# Patient Record
Sex: MALE | Race: WHITE | Employment: FULL TIME | URBAN - METROPOLITAN AREA
[De-identification: names, ages, dates, MRNs, and addresses within clinical notes are randomized per-mention and may not be internally consistent; named-entity substitution may affect disease eponyms.]

---

## 2017-03-16 ENCOUNTER — HOSPITAL ENCOUNTER (OUTPATIENT)
Dept: PREADMISSION TESTING | Age: 57
Discharge: HOME | End: 2017-03-16
Payer: COMMERCIAL

## 2017-03-16 LAB
ALBUMIN SERPL BCP-MCNC: 4.2 G/DL (ref 3.4–5)
ALBUMIN/GLOB SERPL: 1.2 {RATIO} (ref 0.8–1.7)
ALP SERPL-CCNC: 59 U/L (ref 45–117)
ALT SERPL-CCNC: 34 U/L (ref 16–61)
ANION GAP BLD CALC-SCNC: 7 MMOL/L (ref 3–18)
APPEARANCE UR: CLEAR
APTT PPP: 31.5 SEC (ref 23–36.4)
AST SERPL W P-5'-P-CCNC: 25 U/L (ref 15–37)
BACTERIA SPEC CULT: NORMAL
BASOPHILS # BLD AUTO: 0 K/UL (ref 0–0.06)
BASOPHILS # BLD: 1 % (ref 0–2)
BILIRUB SERPL-MCNC: 0.5 MG/DL (ref 0.2–1)
BILIRUB UR QL: NEGATIVE
BUN SERPL-MCNC: 22 MG/DL (ref 7–18)
BUN/CREAT SERPL: 22 (ref 12–20)
CALCIUM SERPL-MCNC: 9.3 MG/DL (ref 8.5–10.1)
CHLORIDE SERPL-SCNC: 103 MMOL/L (ref 100–108)
CO2 SERPL-SCNC: 30 MMOL/L (ref 21–32)
COLOR UR: YELLOW
CREAT SERPL-MCNC: 1 MG/DL (ref 0.6–1.3)
DIFFERENTIAL METHOD BLD: ABNORMAL
EOSINOPHIL # BLD: 0.2 K/UL (ref 0–0.4)
EOSINOPHIL NFR BLD: 4 % (ref 0–5)
ERYTHROCYTE [DISTWIDTH] IN BLOOD BY AUTOMATED COUNT: 13.5 % (ref 11.6–14.5)
ERYTHROCYTE [SEDIMENTATION RATE] IN BLOOD: 11 MM/HR (ref 0–20)
EST. AVERAGE GLUCOSE BLD GHB EST-MCNC: 120 MG/DL
GLOBULIN SER CALC-MCNC: 3.6 G/DL (ref 2–4)
GLUCOSE SERPL-MCNC: 92 MG/DL (ref 74–99)
GLUCOSE UR STRIP.AUTO-MCNC: NEGATIVE MG/DL
HBA1C MFR BLD: 5.8 % (ref 4.5–5.6)
HCT VFR BLD AUTO: 45.3 % (ref 36–48)
HGB BLD-MCNC: 15.4 G/DL (ref 13–16)
HGB UR QL STRIP: NEGATIVE
INR PPP: 1 (ref 0.8–1.2)
KETONES UR QL STRIP.AUTO: NEGATIVE MG/DL
LEUKOCYTE ESTERASE UR QL STRIP.AUTO: NEGATIVE
LYMPHOCYTES # BLD AUTO: 53 % (ref 21–52)
LYMPHOCYTES # BLD: 3.5 K/UL (ref 0.9–3.6)
MCH RBC QN AUTO: 29.5 PG (ref 24–34)
MCHC RBC AUTO-ENTMCNC: 34 G/DL (ref 31–37)
MCV RBC AUTO: 86.8 FL (ref 74–97)
MONOCYTES # BLD: 0.7 K/UL (ref 0.05–1.2)
MONOCYTES NFR BLD AUTO: 11 % (ref 3–10)
NEUTS SEG # BLD: 2 K/UL (ref 1.8–8)
NEUTS SEG NFR BLD AUTO: 31 % (ref 40–73)
NITRITE UR QL STRIP.AUTO: NEGATIVE
PH UR STRIP: 6.5 [PH] (ref 5–8)
PLATELET # BLD AUTO: 192 K/UL (ref 135–420)
PMV BLD AUTO: 9.7 FL (ref 9.2–11.8)
POTASSIUM SERPL-SCNC: 4.7 MMOL/L (ref 3.5–5.5)
PROT SERPL-MCNC: 7.8 G/DL (ref 6.4–8.2)
PROT UR STRIP-MCNC: NEGATIVE MG/DL
PROTHROMBIN TIME: 13.2 SEC (ref 11.5–15.2)
RBC # BLD AUTO: 5.22 M/UL (ref 4.7–5.5)
SERVICE CMNT-IMP: NORMAL
SODIUM SERPL-SCNC: 140 MMOL/L (ref 136–145)
SP GR UR REFRACTOMETRY: 1.01 (ref 1–1.03)
UROBILINOGEN UR QL STRIP.AUTO: 0.2 EU/DL (ref 0.2–1)
WBC # BLD AUTO: 6.5 K/UL (ref 4.6–13.2)

## 2017-03-16 PROCEDURE — 80053 COMPREHEN METABOLIC PANEL: CPT | Performed by: ORTHOPAEDIC SURGERY

## 2017-03-16 PROCEDURE — 83036 HEMOGLOBIN GLYCOSYLATED A1C: CPT | Performed by: ORTHOPAEDIC SURGERY

## 2017-03-16 PROCEDURE — 93005 ELECTROCARDIOGRAM TRACING: CPT

## 2017-03-16 PROCEDURE — 85730 THROMBOPLASTIN TIME PARTIAL: CPT | Performed by: ORTHOPAEDIC SURGERY

## 2017-03-16 PROCEDURE — 36415 COLL VENOUS BLD VENIPUNCTURE: CPT | Performed by: ORTHOPAEDIC SURGERY

## 2017-03-16 PROCEDURE — 85652 RBC SED RATE AUTOMATED: CPT | Performed by: ORTHOPAEDIC SURGERY

## 2017-03-16 PROCEDURE — 81003 URINALYSIS AUTO W/O SCOPE: CPT | Performed by: ORTHOPAEDIC SURGERY

## 2017-03-16 PROCEDURE — 85610 PROTHROMBIN TIME: CPT | Performed by: ORTHOPAEDIC SURGERY

## 2017-03-16 PROCEDURE — 87641 MR-STAPH DNA AMP PROBE: CPT | Performed by: ORTHOPAEDIC SURGERY

## 2017-03-16 PROCEDURE — 85025 COMPLETE CBC W/AUTO DIFF WBC: CPT | Performed by: ORTHOPAEDIC SURGERY

## 2017-03-19 LAB
ATRIAL RATE: 51 BPM
CALCULATED P AXIS, ECG09: 29 DEGREES
CALCULATED R AXIS, ECG10: 12 DEGREES
CALCULATED T AXIS, ECG11: 39 DEGREES
DIAGNOSIS, 93000: NORMAL
P-R INTERVAL, ECG05: 158 MS
Q-T INTERVAL, ECG07: 436 MS
QRS DURATION, ECG06: 96 MS
QTC CALCULATION (BEZET), ECG08: 401 MS
VENTRICULAR RATE, ECG03: 51 BPM

## 2017-04-03 ENCOUNTER — ANESTHESIA EVENT (OUTPATIENT)
Dept: SURGERY | Age: 57
DRG: 468 | End: 2017-04-03
Payer: COMMERCIAL

## 2017-04-03 PROBLEM — Z96.659 PAIN DUE TO KNEE JOINT PROSTHESIS (HCC): Chronic | Status: ACTIVE | Noted: 2017-04-03

## 2017-04-03 PROBLEM — T84.84XA PAIN DUE TO KNEE JOINT PROSTHESIS (HCC): Chronic | Status: ACTIVE | Noted: 2017-04-03

## 2017-04-03 NOTE — H&P
[address] [facility] [address] Medicine  History and Physical Exam    Patient: Helga Muniz MRN: [id number]  SSN: [id number]    YOB: 1960  Age: 49 y.o. Sex: male      Subjective:      Chief Complaint: Right knee pain    History of Present Illness:  Patient complains of right knee stiffness, pain and difficulty ambulating, which has progressed over the past several months. he is s/p prior medial unicondylar arthroplasty of the right knee on 12/15/14. He continues to have stiffness and pain that has persisted and progressed despite conservative treatments and therapies. The patient has been previously treated with oral analgesics, injections, physical therapy. The patient has at this time opted for surgical intervention. Past Medical History:   Diagnosis Date    Arthritis     mild- osteo    Asthma     exercise induced    Chronic pain     knees    Liver disease 1998    Hepatitis C-clear now    Osteoarthritis of right knee 12/14/2014    Pain due to knee joint prosthesis ([address] 4/3/2017    Unspecified sleep apnea     has CPAP machine but hasn't used in 5 years     Past Surgical History:   Procedure Laterality Date    HX APPENDECTOMY  1992    HX GI  4/2012    blocked small intestine-resolved on its own    HX HEENT      T & A    HX KNEE ARTHROSCOPY Left     HX KNEE REPLACEMENT Right 02/2014   [facility]  6-    right knee scope x2    HX ROTATOR CUFF REPAIR Right      Social History     Occupational History    Not on file. Social History Main Topics    Smoking status: Never Smoker    Smokeless tobacco: Never Used    Alcohol use 1.0 oz/week     2 Cans of beer per week    Drug use: No    Sexual activity: Yes     Prior to Admission medications    Medication Sig Start Date End Date Taking? Authorizing Provider   naproxen sodium (ALEVE) 220 mg cap Take 1 Tab by mouth as needed.  Indications: Pain    Historical Provider       Allergies: No Known Allergies     Review of Systems:  Pertinent items are noted in the History of Present Illness. Objective:       Physical Exam:  HEENT: Normocephalic, atraumatic  Lungs:  Clear to auscultation  Heart:   Regular rate and rhythm  Abdomen: Soft  Extremities:  Pain and limited range of motion of the right knee. Generalized tenderness. Healed surgical scar. Antalgic gait noted. Healed surgical scar. Assessment:      Pain in the knee following right medial unicondylar arthroplasty    Plan:       Proceed with scheduled REVISION ARTHROPLASTY WITH POLYETHYLENE EXCHANGE AND EXCISION OF SCAR TISSUE. The patient would benefit from the use of Vancomycin for antibiotic prophylaxis due to increased risk of infection. The various methods of treatment have been discussed with the patient and family. After consideration of risks, benefits, and other options for treatment, the patient has consented to surgical interventions. Questions were answered and preoperative teaching was done by Dr Betty Mendes.      Signed By: NICHOL Prajapati     April 3, 2017

## 2017-04-04 ENCOUNTER — HOSPITAL ENCOUNTER (INPATIENT)
Age: 57
LOS: 1 days | Discharge: HOME HEALTH | DRG: 468 | End: 2017-04-05
Attending: ORTHOPAEDIC SURGERY | Admitting: ORTHOPAEDIC SURGERY
Payer: COMMERCIAL

## 2017-04-04 ENCOUNTER — ANESTHESIA (OUTPATIENT)
Dept: SURGERY | Age: 57
DRG: 468 | End: 2017-04-04
Payer: COMMERCIAL

## 2017-04-04 ENCOUNTER — APPOINTMENT (OUTPATIENT)
Dept: GENERAL RADIOLOGY | Age: 57
DRG: 468 | End: 2017-04-04
Attending: PHYSICIAN ASSISTANT
Payer: COMMERCIAL

## 2017-04-04 PROBLEM — T84.84XA PAIN DUE TO UNICOMPARTMENTAL ARTHROPLASTY OF KNEE (HCC): Status: ACTIVE | Noted: 2017-04-04

## 2017-04-04 PROBLEM — Z96.659 PAIN DUE TO UNICOMPARTMENTAL ARTHROPLASTY OF KNEE (HCC): Status: ACTIVE | Noted: 2017-04-04

## 2017-04-04 LAB
ABO + RH BLD: NORMAL
BLOOD GROUP ANTIBODIES SERPL: NORMAL
GLUCOSE BLD STRIP.AUTO-MCNC: 100 MG/DL (ref 70–110)
SPECIMEN EXP DATE BLD: NORMAL

## 2017-04-04 PROCEDURE — 74011000258 HC RX REV CODE- 258: Performed by: ORTHOPAEDIC SURGERY

## 2017-04-04 PROCEDURE — 77030018836 HC SOL IRR NACL ICUM -A: Performed by: ORTHOPAEDIC SURGERY

## 2017-04-04 PROCEDURE — 77030020259 HC SOL INJ SOD CL 0.9% 100ML BG: Performed by: ORTHOPAEDIC SURGERY

## 2017-04-04 PROCEDURE — 74011250637 HC RX REV CODE- 250/637: Performed by: PHYSICIAN ASSISTANT

## 2017-04-04 PROCEDURE — 77030018835 HC SOL IRR LR ICUM -A: Performed by: ORTHOPAEDIC SURGERY

## 2017-04-04 PROCEDURE — 77030033067 HC SUT PDO STRATFX SPIR J&J -B: Performed by: ORTHOPAEDIC SURGERY

## 2017-04-04 PROCEDURE — 77010033678 HC OXYGEN DAILY

## 2017-04-04 PROCEDURE — 77030003666 HC NDL SPINAL BD -A: Performed by: ORTHOPAEDIC SURGERY

## 2017-04-04 PROCEDURE — 74011250636 HC RX REV CODE- 250/636

## 2017-04-04 PROCEDURE — 86900 BLOOD TYPING SEROLOGIC ABO: CPT | Performed by: ORTHOPAEDIC SURGERY

## 2017-04-04 PROCEDURE — 77030027138 HC INCENT SPIROMETER -A: Performed by: ORTHOPAEDIC SURGERY

## 2017-04-04 PROCEDURE — 0SPT0JZ REMOVAL OF SYNTHETIC SUBSTITUTE FROM RIGHT KNEE JOINT, FEMORAL SURFACE, OPEN APPROACH: ICD-10-PCS | Performed by: ORTHOPAEDIC SURGERY

## 2017-04-04 PROCEDURE — 74011250636 HC RX REV CODE- 250/636: Performed by: ORTHOPAEDIC SURGERY

## 2017-04-04 PROCEDURE — 77030016060 HC NDL NRV BLK TELE -A: Performed by: SPECIALIST

## 2017-04-04 PROCEDURE — 88305 TISSUE EXAM BY PATHOLOGIST: CPT | Performed by: ORTHOPAEDIC SURGERY

## 2017-04-04 PROCEDURE — 0SRT0J9 REPLACEMENT OF RIGHT KNEE JOINT, FEMORAL SURFACE WITH SYNTHETIC SUBSTITUTE, CEMENTED, OPEN APPROACH: ICD-10-PCS | Performed by: ORTHOPAEDIC SURGERY

## 2017-04-04 PROCEDURE — 74011000258 HC RX REV CODE- 258: Performed by: PHYSICIAN ASSISTANT

## 2017-04-04 PROCEDURE — 97116 GAIT TRAINING THERAPY: CPT

## 2017-04-04 PROCEDURE — C9290 INJ, BUPIVACAINE LIPOSOME: HCPCS | Performed by: ORTHOPAEDIC SURGERY

## 2017-04-04 PROCEDURE — 74011000258 HC RX REV CODE- 258

## 2017-04-04 PROCEDURE — 64450 NJX AA&/STRD OTHER PN/BRANCH: CPT | Performed by: SPECIALIST

## 2017-04-04 PROCEDURE — 74011000250 HC RX REV CODE- 250: Performed by: ORTHOPAEDIC SURGERY

## 2017-04-04 PROCEDURE — 77030034694 HC SCPL CANADY PLSM DISP USMD -E: Performed by: ORTHOPAEDIC SURGERY

## 2017-04-04 PROCEDURE — 36415 COLL VENOUS BLD VENIPUNCTURE: CPT | Performed by: ORTHOPAEDIC SURGERY

## 2017-04-04 PROCEDURE — 77030011640 HC PAD GRND REM COVD -A: Performed by: ORTHOPAEDIC SURGERY

## 2017-04-04 PROCEDURE — C1713 ANCHOR/SCREW BN/BN,TIS/BN: HCPCS | Performed by: ORTHOPAEDIC SURGERY

## 2017-04-04 PROCEDURE — 74011250637 HC RX REV CODE- 250/637: Performed by: SPECIALIST

## 2017-04-04 PROCEDURE — C1776 JOINT DEVICE (IMPLANTABLE): HCPCS | Performed by: ORTHOPAEDIC SURGERY

## 2017-04-04 PROCEDURE — 74011250636 HC RX REV CODE- 250/636: Performed by: PHYSICIAN ASSISTANT

## 2017-04-04 PROCEDURE — 65270000029 HC RM PRIVATE

## 2017-04-04 PROCEDURE — 76060000033 HC ANESTHESIA 1 TO 1.5 HR: Performed by: ORTHOPAEDIC SURGERY

## 2017-04-04 PROCEDURE — 77030031139 HC SUT VCRL2 J&J -A: Performed by: ORTHOPAEDIC SURGERY

## 2017-04-04 PROCEDURE — 77030034479 HC ADH SKN CLSR PRINEO J&J -B: Performed by: ORTHOPAEDIC SURGERY

## 2017-04-04 PROCEDURE — 77030011256 HC DRSG MEPILEX <16IN NO BORD MOLN -A: Performed by: ORTHOPAEDIC SURGERY

## 2017-04-04 PROCEDURE — 76942 ECHO GUIDE FOR BIOPSY: CPT | Performed by: ORTHOPAEDIC SURGERY

## 2017-04-04 PROCEDURE — 74011000250 HC RX REV CODE- 250: Performed by: PHYSICIAN ASSISTANT

## 2017-04-04 PROCEDURE — 76210000063 HC OR PH I REC FIRST 0.5 HR: Performed by: ORTHOPAEDIC SURGERY

## 2017-04-04 PROCEDURE — 74011250636 HC RX REV CODE- 250/636: Performed by: SPECIALIST

## 2017-04-04 PROCEDURE — 0SUT09Z SUPPLEMENT RIGHT KNEE JOINT, FEMORAL SURFACE WITH LINER, OPEN APPROACH: ICD-10-PCS | Performed by: ORTHOPAEDIC SURGERY

## 2017-04-04 PROCEDURE — 76010000149 HC OR TIME 1 TO 1.5 HR: Performed by: ORTHOPAEDIC SURGERY

## 2017-04-04 PROCEDURE — 77030032489 HC SLV COMPR SCD FT CUF COVD -B: Performed by: ORTHOPAEDIC SURGERY

## 2017-04-04 PROCEDURE — 73560 X-RAY EXAM OF KNEE 1 OR 2: CPT

## 2017-04-04 PROCEDURE — 0SPC09Z REMOVAL OF LINER FROM RIGHT KNEE JOINT, OPEN APPROACH: ICD-10-PCS | Performed by: ORTHOPAEDIC SURGERY

## 2017-04-04 PROCEDURE — 77030011628: Performed by: ORTHOPAEDIC SURGERY

## 2017-04-04 PROCEDURE — 74011250637 HC RX REV CODE- 250/637: Performed by: ORTHOPAEDIC SURGERY

## 2017-04-04 PROCEDURE — 77030036563 HC WRP CLD THER KNE S2SG -B: Performed by: ORTHOPAEDIC SURGERY

## 2017-04-04 PROCEDURE — 77030020813 HC INST SCULP CEM KT DISP S&N -B: Performed by: ORTHOPAEDIC SURGERY

## 2017-04-04 PROCEDURE — 77030027355 HC HNDPC IRR SURGLAV STRY -B: Performed by: ORTHOPAEDIC SURGERY

## 2017-04-04 PROCEDURE — 74011000250 HC RX REV CODE- 250

## 2017-04-04 PROCEDURE — 97161 PT EVAL LOW COMPLEX 20 MIN: CPT

## 2017-04-04 PROCEDURE — 82962 GLUCOSE BLOOD TEST: CPT

## 2017-04-04 PROCEDURE — 77030012508 HC MSK AIRWY LMA AMBU -A: Performed by: SPECIALIST

## 2017-04-04 PROCEDURE — 77030020782 HC GWN BAIR PAWS FLX 3M -B: Performed by: ORTHOPAEDIC SURGERY

## 2017-04-04 DEVICE — CEMENT BNE 40GM FULL DOSE PMMA W/O GENT M VISC N RADPQ LNG: Type: IMPLANTABLE DEVICE | Site: KNEE | Status: FUNCTIONAL

## 2017-04-04 DEVICE — IMPLANTABLE DEVICE: Type: IMPLANTABLE DEVICE | Site: KNEE | Status: FUNCTIONAL

## 2017-04-04 RX ORDER — ACETAMINOPHEN 325 MG/1
650 TABLET ORAL EVERY 6 HOURS
Status: DISCONTINUED | OUTPATIENT
Start: 2017-04-05 | End: 2017-04-05 | Stop reason: HOSPADM

## 2017-04-04 RX ORDER — ZOLPIDEM TARTRATE 5 MG/1
5-10 TABLET ORAL
Status: DISCONTINUED | OUTPATIENT
Start: 2017-04-04 | End: 2017-04-05 | Stop reason: HOSPADM

## 2017-04-04 RX ORDER — ONDANSETRON 2 MG/ML
4 INJECTION INTRAMUSCULAR; INTRAVENOUS ONCE
Status: DISCONTINUED | OUTPATIENT
Start: 2017-04-04 | End: 2017-04-04 | Stop reason: HOSPADM

## 2017-04-04 RX ORDER — ACETAMINOPHEN 10 MG/ML
1000 INJECTION, SOLUTION INTRAVENOUS ONCE
Status: COMPLETED | OUTPATIENT
Start: 2017-04-04 | End: 2017-04-04

## 2017-04-04 RX ORDER — OXYCODONE AND ACETAMINOPHEN 5; 325 MG/1; MG/1
1 TABLET ORAL AS NEEDED
Status: DISCONTINUED | OUTPATIENT
Start: 2017-04-04 | End: 2017-04-04 | Stop reason: HOSPADM

## 2017-04-04 RX ORDER — MELOXICAM 7.5 MG/1
7.5 TABLET ORAL 2 TIMES DAILY
Status: DISCONTINUED | OUTPATIENT
Start: 2017-04-04 | End: 2017-04-05 | Stop reason: HOSPADM

## 2017-04-04 RX ORDER — SODIUM CHLORIDE, SODIUM LACTATE, POTASSIUM CHLORIDE, CALCIUM CHLORIDE 600; 310; 30; 20 MG/100ML; MG/100ML; MG/100ML; MG/100ML
125 INJECTION, SOLUTION INTRAVENOUS CONTINUOUS
Status: DISCONTINUED | OUTPATIENT
Start: 2017-04-04 | End: 2017-04-05 | Stop reason: HOSPADM

## 2017-04-04 RX ORDER — ONDANSETRON 2 MG/ML
4 INJECTION INTRAMUSCULAR; INTRAVENOUS
Status: DISCONTINUED | OUTPATIENT
Start: 2017-04-04 | End: 2017-04-05 | Stop reason: HOSPADM

## 2017-04-04 RX ORDER — OXYCODONE HYDROCHLORIDE 5 MG/1
10 TABLET ORAL ONCE
Status: COMPLETED | OUTPATIENT
Start: 2017-04-04 | End: 2017-04-04

## 2017-04-04 RX ORDER — NALOXONE HYDROCHLORIDE 0.4 MG/ML
0.4 INJECTION, SOLUTION INTRAMUSCULAR; INTRAVENOUS; SUBCUTANEOUS AS NEEDED
Status: DISCONTINUED | OUTPATIENT
Start: 2017-04-04 | End: 2017-04-05 | Stop reason: HOSPADM

## 2017-04-04 RX ORDER — SODIUM CHLORIDE 0.9 % (FLUSH) 0.9 %
5-10 SYRINGE (ML) INJECTION EVERY 8 HOURS
Status: DISCONTINUED | OUTPATIENT
Start: 2017-04-04 | End: 2017-04-05 | Stop reason: HOSPADM

## 2017-04-04 RX ORDER — METOCLOPRAMIDE HYDROCHLORIDE 5 MG/ML
10 INJECTION INTRAMUSCULAR; INTRAVENOUS
Status: DISCONTINUED | OUTPATIENT
Start: 2017-04-04 | End: 2017-04-05 | Stop reason: HOSPADM

## 2017-04-04 RX ORDER — ACETAMINOPHEN 10 MG/ML
1000 INJECTION, SOLUTION INTRAVENOUS EVERY 6 HOURS
Status: COMPLETED | OUTPATIENT
Start: 2017-04-04 | End: 2017-04-04

## 2017-04-04 RX ORDER — SODIUM CHLORIDE 0.9 % (FLUSH) 0.9 %
5-10 SYRINGE (ML) INJECTION AS NEEDED
Status: DISCONTINUED | OUTPATIENT
Start: 2017-04-04 | End: 2017-04-05 | Stop reason: HOSPADM

## 2017-04-04 RX ORDER — SODIUM CHLORIDE, SODIUM LACTATE, POTASSIUM CHLORIDE, CALCIUM CHLORIDE 600; 310; 30; 20 MG/100ML; MG/100ML; MG/100ML; MG/100ML
50 INJECTION, SOLUTION INTRAVENOUS CONTINUOUS
Status: DISCONTINUED | OUTPATIENT
Start: 2017-04-04 | End: 2017-04-04 | Stop reason: HOSPADM

## 2017-04-04 RX ORDER — MIDAZOLAM HYDROCHLORIDE 1 MG/ML
INJECTION, SOLUTION INTRAMUSCULAR; INTRAVENOUS AS NEEDED
Status: DISCONTINUED | OUTPATIENT
Start: 2017-04-04 | End: 2017-04-04 | Stop reason: HOSPADM

## 2017-04-04 RX ORDER — OXYCODONE HYDROCHLORIDE 5 MG/1
5-10 TABLET ORAL
Status: DISCONTINUED | OUTPATIENT
Start: 2017-04-04 | End: 2017-04-05 | Stop reason: HOSPADM

## 2017-04-04 RX ORDER — ONDANSETRON 2 MG/ML
INJECTION INTRAMUSCULAR; INTRAVENOUS AS NEEDED
Status: DISCONTINUED | OUTPATIENT
Start: 2017-04-04 | End: 2017-04-04 | Stop reason: HOSPADM

## 2017-04-04 RX ORDER — GLYCOPYRROLATE 0.2 MG/ML
INJECTION INTRAMUSCULAR; INTRAVENOUS AS NEEDED
Status: DISCONTINUED | OUTPATIENT
Start: 2017-04-04 | End: 2017-04-04 | Stop reason: HOSPADM

## 2017-04-04 RX ORDER — DEXAMETHASONE SODIUM PHOSPHATE 4 MG/ML
INJECTION, SOLUTION INTRA-ARTICULAR; INTRALESIONAL; INTRAMUSCULAR; INTRAVENOUS; SOFT TISSUE AS NEEDED
Status: DISCONTINUED | OUTPATIENT
Start: 2017-04-04 | End: 2017-04-04 | Stop reason: HOSPADM

## 2017-04-04 RX ORDER — SODIUM CHLORIDE 9 MG/ML
300 INJECTION, SOLUTION INTRAVENOUS CONTINUOUS
Status: DISPENSED | OUTPATIENT
Start: 2017-04-04 | End: 2017-04-04

## 2017-04-04 RX ORDER — PANTOPRAZOLE SODIUM 40 MG/1
40 TABLET, DELAYED RELEASE ORAL DAILY
Status: DISCONTINUED | OUTPATIENT
Start: 2017-04-04 | End: 2017-04-04

## 2017-04-04 RX ORDER — ASPIRIN 325 MG
325 TABLET ORAL 2 TIMES DAILY
Qty: 42 TAB | Refills: 0 | Status: SHIPPED | OUTPATIENT
Start: 2017-04-04 | End: 2017-04-25

## 2017-04-04 RX ORDER — SODIUM CHLORIDE 9 MG/ML
125 INJECTION, SOLUTION INTRAVENOUS CONTINUOUS
Status: DISPENSED | OUTPATIENT
Start: 2017-04-04 | End: 2017-04-05

## 2017-04-04 RX ORDER — DOCUSATE SODIUM 100 MG/1
100 CAPSULE, LIQUID FILLED ORAL 2 TIMES DAILY
Status: DISCONTINUED | OUTPATIENT
Start: 2017-04-04 | End: 2017-04-05 | Stop reason: HOSPADM

## 2017-04-04 RX ORDER — MELOXICAM 7.5 MG/1
7.5 TABLET ORAL 2 TIMES DAILY
Qty: 28 TAB | Refills: 0 | Status: SHIPPED | OUTPATIENT
Start: 2017-04-04 | End: 2017-04-18

## 2017-04-04 RX ORDER — SODIUM CHLORIDE 0.9 % (FLUSH) 0.9 %
5-10 SYRINGE (ML) INJECTION AS NEEDED
Status: DISCONTINUED | OUTPATIENT
Start: 2017-04-04 | End: 2017-04-04 | Stop reason: HOSPADM

## 2017-04-04 RX ORDER — DIPHENHYDRAMINE HCL 25 MG
25 CAPSULE ORAL
Status: DISCONTINUED | OUTPATIENT
Start: 2017-04-04 | End: 2017-04-05 | Stop reason: HOSPADM

## 2017-04-04 RX ORDER — LIDOCAINE HYDROCHLORIDE 20 MG/ML
INJECTION, SOLUTION EPIDURAL; INFILTRATION; INTRACAUDAL; PERINEURAL AS NEEDED
Status: DISCONTINUED | OUTPATIENT
Start: 2017-04-04 | End: 2017-04-04 | Stop reason: HOSPADM

## 2017-04-04 RX ORDER — KETOROLAC TROMETHAMINE 30 MG/ML
15 INJECTION, SOLUTION INTRAMUSCULAR; INTRAVENOUS
Status: ACTIVE | OUTPATIENT
Start: 2017-04-04 | End: 2017-04-05

## 2017-04-04 RX ORDER — FENTANYL CITRATE 50 UG/ML
25 INJECTION, SOLUTION INTRAMUSCULAR; INTRAVENOUS
Status: ACTIVE | OUTPATIENT
Start: 2017-04-04 | End: 2017-04-04

## 2017-04-04 RX ORDER — ASPIRIN 325 MG/1
325 TABLET, FILM COATED ORAL
Status: DISCONTINUED | OUTPATIENT
Start: 2017-04-04 | End: 2017-04-05 | Stop reason: HOSPADM

## 2017-04-04 RX ORDER — OXYCODONE AND ACETAMINOPHEN 5; 325 MG/1; MG/1
TABLET ORAL
Qty: 60 TAB | Refills: 0 | Status: SHIPPED | OUTPATIENT
Start: 2017-04-04 | End: 2020-01-28

## 2017-04-04 RX ORDER — LANOLIN ALCOHOL/MO/W.PET/CERES
1 CREAM (GRAM) TOPICAL 3 TIMES DAILY
Status: DISCONTINUED | OUTPATIENT
Start: 2017-04-04 | End: 2017-04-05 | Stop reason: HOSPADM

## 2017-04-04 RX ORDER — FENTANYL CITRATE 50 UG/ML
INJECTION, SOLUTION INTRAMUSCULAR; INTRAVENOUS AS NEEDED
Status: DISCONTINUED | OUTPATIENT
Start: 2017-04-04 | End: 2017-04-04 | Stop reason: HOSPADM

## 2017-04-04 RX ORDER — PANTOPRAZOLE SODIUM 40 MG/1
40 TABLET, DELAYED RELEASE ORAL DAILY
Status: DISCONTINUED | OUTPATIENT
Start: 2017-04-04 | End: 2017-04-05 | Stop reason: HOSPADM

## 2017-04-04 RX ORDER — HYDROMORPHONE HYDROCHLORIDE 1 MG/ML
0.5 INJECTION, SOLUTION INTRAMUSCULAR; INTRAVENOUS; SUBCUTANEOUS
Status: DISCONTINUED | OUTPATIENT
Start: 2017-04-04 | End: 2017-04-04 | Stop reason: HOSPADM

## 2017-04-04 RX ORDER — PREGABALIN 50 MG/1
50 CAPSULE ORAL
Status: COMPLETED | OUTPATIENT
Start: 2017-04-04 | End: 2017-04-04

## 2017-04-04 RX ORDER — CELECOXIB 100 MG/1
200 CAPSULE ORAL
Status: COMPLETED | OUTPATIENT
Start: 2017-04-04 | End: 2017-04-04

## 2017-04-04 RX ORDER — PROPOFOL 10 MG/ML
INJECTION, EMULSION INTRAVENOUS AS NEEDED
Status: DISCONTINUED | OUTPATIENT
Start: 2017-04-04 | End: 2017-04-04 | Stop reason: HOSPADM

## 2017-04-04 RX ORDER — NALOXONE HYDROCHLORIDE 0.4 MG/ML
0.1 INJECTION, SOLUTION INTRAMUSCULAR; INTRAVENOUS; SUBCUTANEOUS
Status: DISCONTINUED | OUTPATIENT
Start: 2017-04-04 | End: 2017-04-04 | Stop reason: HOSPADM

## 2017-04-04 RX ORDER — DIPHENHYDRAMINE HYDROCHLORIDE 50 MG/ML
12.5 INJECTION, SOLUTION INTRAMUSCULAR; INTRAVENOUS
Status: DISCONTINUED | OUTPATIENT
Start: 2017-04-04 | End: 2017-04-05 | Stop reason: HOSPADM

## 2017-04-04 RX ADMIN — SODIUM CHLORIDE 300 ML/HR: 900 INJECTION, SOLUTION INTRAVENOUS at 14:08

## 2017-04-04 RX ADMIN — SODIUM CHLORIDE, SODIUM LACTATE, POTASSIUM CHLORIDE, AND CALCIUM CHLORIDE 125 ML/HR: 600; 310; 30; 20 INJECTION, SOLUTION INTRAVENOUS at 07:54

## 2017-04-04 RX ADMIN — PREGABALIN 50 MG: 50 CAPSULE ORAL at 08:25

## 2017-04-04 RX ADMIN — DOCUSATE SODIUM 100 MG: 100 CAPSULE, LIQUID FILLED ORAL at 21:14

## 2017-04-04 RX ADMIN — ONDANSETRON 4 MG: 2 INJECTION INTRAMUSCULAR; INTRAVENOUS at 10:43

## 2017-04-04 RX ADMIN — DEXAMETHASONE SODIUM PHOSPHATE 4 MG: 4 INJECTION, SOLUTION INTRA-ARTICULAR; INTRALESIONAL; INTRAMUSCULAR; INTRAVENOUS; SOFT TISSUE at 10:43

## 2017-04-04 RX ADMIN — OXYCODONE HYDROCHLORIDE 10 MG: 5 TABLET ORAL at 08:26

## 2017-04-04 RX ADMIN — PROPOFOL 200 MG: 10 INJECTION, EMULSION INTRAVENOUS at 10:29

## 2017-04-04 RX ADMIN — SODIUM CHLORIDE, SODIUM LACTATE, POTASSIUM CHLORIDE, AND CALCIUM CHLORIDE: 600; 310; 30; 20 INJECTION, SOLUTION INTRAVENOUS at 11:16

## 2017-04-04 RX ADMIN — ACETAMINOPHEN 1000 MG: 10 INJECTION, SOLUTION INTRAVENOUS at 10:27

## 2017-04-04 RX ADMIN — PANTOPRAZOLE SODIUM 40 MG: 40 TABLET, DELAYED RELEASE ORAL at 08:26

## 2017-04-04 RX ADMIN — ACETAMINOPHEN 1000 MG: 10 INJECTION, SOLUTION INTRAVENOUS at 22:28

## 2017-04-04 RX ADMIN — CELECOXIB 200 MG: 100 CAPSULE ORAL at 08:26

## 2017-04-04 RX ADMIN — SODIUM CHLORIDE, SODIUM LACTATE, POTASSIUM CHLORIDE, AND CALCIUM CHLORIDE 1000 ML: 600; 310; 30; 20 INJECTION, SOLUTION INTRAVENOUS at 07:54

## 2017-04-04 RX ADMIN — SODIUM CHLORIDE 125 ML/HR: 900 INJECTION, SOLUTION INTRAVENOUS at 17:53

## 2017-04-04 RX ADMIN — FERROUS SULFATE TAB 325 MG (65 MG ELEMENTAL FE) 325 MG: 325 (65 FE) TAB at 22:28

## 2017-04-04 RX ADMIN — SODIUM CHLORIDE 1 G: 900 INJECTION, SOLUTION INTRAVENOUS at 10:42

## 2017-04-04 RX ADMIN — VANCOMYCIN HYDROCHLORIDE 1750 G: 10 INJECTION, POWDER, LYOPHILIZED, FOR SOLUTION INTRAVENOUS at 10:30

## 2017-04-04 RX ADMIN — ASPIRIN 325 MG: 325 TABLET, FILM COATED ORAL at 17:53

## 2017-04-04 RX ADMIN — FERROUS SULFATE TAB 325 MG (65 MG ELEMENTAL FE) 325 MG: 325 (65 FE) TAB at 16:24

## 2017-04-04 RX ADMIN — VANCOMYCIN HYDROCHLORIDE 1750 MG: 10 INJECTION, POWDER, LYOPHILIZED, FOR SOLUTION INTRAVENOUS at 21:14

## 2017-04-04 RX ADMIN — MIDAZOLAM HYDROCHLORIDE 2 MG: 1 INJECTION, SOLUTION INTRAMUSCULAR; INTRAVENOUS at 09:28

## 2017-04-04 RX ADMIN — LIDOCAINE HYDROCHLORIDE 80 MG: 20 INJECTION, SOLUTION EPIDURAL; INFILTRATION; INTRACAUDAL; PERINEURAL at 10:29

## 2017-04-04 RX ADMIN — ACETAMINOPHEN 1000 MG: 10 INJECTION, SOLUTION INTRAVENOUS at 16:24

## 2017-04-04 RX ADMIN — GLYCOPYRROLATE 0.4 MG: 0.2 INJECTION INTRAMUSCULAR; INTRAVENOUS at 10:32

## 2017-04-04 RX ADMIN — SODIUM CHLORIDE 1 G: 900 INJECTION, SOLUTION INTRAVENOUS at 11:25

## 2017-04-04 RX ADMIN — FENTANYL CITRATE 50 MCG: 50 INJECTION, SOLUTION INTRAMUSCULAR; INTRAVENOUS at 10:23

## 2017-04-04 RX ADMIN — MELOXICAM 7.5 MG: 7.5 TABLET ORAL at 21:14

## 2017-04-04 RX ADMIN — LIDOCAINE HYDROCHLORIDE 20 MG: 20 INJECTION, SOLUTION EPIDURAL; INFILTRATION; INTRACAUDAL; PERINEURAL at 11:43

## 2017-04-04 NOTE — INTERVAL H&P NOTE
H&P Update:  Gregor Favre was seen and examined. History and physical has been reviewed. The patient has been examined.  There have been no significant clinical changes since the completion of the originally dated History and Physical.    Signed By: Humberto Alpers, MD     April 4, 2017 8:41 AM

## 2017-04-04 NOTE — PROGRESS NOTES
Problem: Mobility Impaired (Adult and Pediatric)  Goal: *Acute Goals and Plan of Care (Insert Text)  In 1-7 days pt will be able to perform:  ST. Bed mobility: Rolling L to R to L modified independent for positioning. 2. Supine to sit to supine S with HR for meals. 3. Sit to stand to sit S with RW in prep for ambulation. LT. Gait: Ambulate >150ft S with RW, WBAT, for home/community mobility. 2. Stair Negotiation: Ascend/descend >3 steps CGA with HR for home entry. 3. Activity tolerance: Tolerate up in chair 1-2 hours for ADLs. 4. Patient/Family Education: Patient/family to be independent with HEP for follow-up care and safe discharge. PHYSICAL THERAPY EVALUATION     Patient: Ambreen Parada (89 y.o. male)  Date: 2017  Primary Diagnosis: PAIN DUE TO INTERNAL ORTHOPEDIC PROSTHETIC DEVICES,IMPLANTS & GRAFTS  Pain due to unicompartmental arthroplasty of knee (HCC)  Procedure(s) (LRB):  REVISION RIGHT KNEE WITH OPEN POLY EXCHANGE & SCAR TISSUE EXCISION (Right) Day of Surgery   Precautions:   Fall, WBAT      ASSESSMENT :  Based on the objective data described below, the patient presents with decreased functional mobility and independence in regard to bed mobility, transfers, gt quality and tolerance, R knee AROM, R knee strength, pain, stair negotiation and safety due to recent R knee surgery. Pt denies pain at present and post evaluation. Pt able to transition supine to sit and sit to supine SBA. Pt was CGA for sit to stand w/ vc for technique and safety. Pt able to participate in gt training w/ RW, WBAT, GB and CGA in hallway w/ antalgic pattern. Pt able to  bathroom to void and performed own hygiene. Pt returned to supine in bed w/ all needs within reach, SCDs applied and ice pack to R knee. Nurse Mariya Hanna aware and wife present. Recommend [facility] d/c. Patient will benefit from skilled intervention to address the above impairments.   Patients rehabilitation potential is considered to be Good  Factors which may influence rehabilitation potential include:   [ ]         None noted  [ ]         Mental ability/status  [ ]         Medical condition  [ ]         Home/family situation and support systems  [ ]         Safety awareness  [X]         Pain tolerance/management  [ ]         Other:        PLAN :  Recommendations and Planned Interventions:  [X]           Bed Mobility Training             [ ]    Neuromuscular Re-Education  [X]           Transfer Training                   [ ]    Orthotic/Prosthetic Training  [X]           Gait Training                          [ ]    Modalities  [X]           Therapeutic Exercises          [ ]    Edema Management/Control  [X]           Therapeutic Activities            [X]    Patient and Family Training/Education  [ ]           Other (comment):     Frequency/Duration: Patient will be followed by physical therapy twice daily to address goals. Discharge Recommendations: Home Health  Further Equipment Recommendations for Discharge: N/A       SUBJECTIVE:   Patient stated We are getting up now?       OBJECTIVE DATA SUMMARY:       Past Medical History:   Diagnosis Date    Arthritis       mild- osteo    Asthma       exercise induced    Chronic pain       knees    Liver disease 1998     Hepatitis C-clear now    Osteoarthritis of right knee 12/14/2014    Pain due to knee joint prosthesis ([address] 4/3/2017    Unspecified sleep apnea       has CPAP machine but hasn't used in 5 years     Past Surgical History:   Procedure Laterality Date    HX APPENDECTOMY   1992    HX GI   4/2012     blocked small intestine-resolved on its own    HX HEENT         T & A    HX KNEE ARTHROSCOPY Left      HX KNEE REPLACEMENT Right [facility]-     right knee scope x2    HX ROTATOR CUFF REPAIR Right       Barriers to Learning/Limitations: None  Compensate with: visual, verbal, tactile, kinesthetic cues/model  Prior Level of Function/Home Situation:   Home Situation  Home Environment: Private residence  # Steps to Enter: 5  Rails to Enter: Yes  Hand Rails : Bilateral  One/Two Story Residence: Two story, live on 1st floor  # of Interior Steps: 15  Height of Each Step (in): 8 inches  Interior Rails: Both  Lift Chair Available: No  Living Alone: No  Support Systems: Spouse/Significant Other/Partner  Patient Expects to be Discharged to[de-identified] Private residence  Current DME Used/Available at Home: [employer], straight, Walker, rolling  Critical Behavior:  Neurologic State: Alert; Appropriate for age  Orientation Level: Oriented X4  Cognition: Appropriate decision making; Appropriate for age attention/concentration; Appropriate safety awareness; Follows commands  Safety/Judgement: Awareness of environment  Psychosocial  Patient Behaviors: Calm; Cooperative  Family  Behaviors: Supportive;Calm; Appropriate for situation  Skin Condition/Temp: Dry;Warm  Family  Behaviors: Supportive;Calm; Appropriate for situation  Skin Integrity: Incision (comment) (R knee)  Skin Integumentary  Skin Color: Appropriate for ethnicity  Skin Condition/Temp: Dry;Warm  Skin Integrity: Incision (comment) (R knee)  Strength:    Strength: Generally decreased, functional  Tone & Sensation:   Tone: Normal  Sensation: Intact  Range Of Motion:  AROM: Generally decreased, functional  Functional Mobility:  Bed Mobility:  Supine to Sit: Stand-by asssistance (vc)  Sit to Supine: Stand-by asssistance (vc)  Scooting: Stand-by asssistance (vc)  Transfers:  Sit to Stand: Contact guard assistance (vc)  Stand to Sit: Stand-by asssistance (vc)  Balance:   Sitting: Intact  Standing: Intact; With support  Ambulation/Gait Training:  Distance (ft): 75 Feet (ft)  Assistive Device: Walker, rolling;Gait belt  Ambulation - Level of Assistance: Contact guard assistance;Stand-by asssistance (vc)  Gait Abnormalities: Antalgic;Decreased step clearance  Right Side Weight Bearing: As tolerated  Base of Support: Shift to left  Stance: Right decreased  Speed/Susie: Slow  Step Length: Left shortened;Right shortened  Swing Pattern: Left asymmetrical;Right asymmetrical  Interventions: Safety awareness training;Verbal cues  Therapeutic Exercises:   HEP written copy issued to pt per MD protocol. Pain:  Pain Scale 1: Numeric (0 - 10)  Pain Intensity 1: 0  Pain Location 1: Knee  Pain Orientation 1: Right  Pain Description 1: Aching;Constant  Pain Intervention(s) 1: Declines;Nurse notified  Activity Tolerance:   Fair   Please refer to the flowsheet for vital signs taken during this treatment. After treatment:   [ ]         Patient left in no apparent distress sitting up in chair  [X]         Patient left in no apparent distress in bed  [X]         Call bell left within reach  [X]         Nursing notified  [X]         Wife present  [ ]         Bed alarm activated      COMMUNICATION/EDUCATION:   [X]         Fall prevention education was provided and the patient/caregiver indicated understanding. [X]         Patient/family have participated as able in goal setting and plan of care. [X]         Patient/family agree to work toward stated goals and plan of care. [ ]         Patient understands intent and goals of therapy, but is neutral about his/her participation. [ ]         Patient is unable to participate in goal setting and plan of care.      Thank you for this referral.  Wilian Fontaine, PT   Time Calculation: 35 mins  Eval Complexity: History: LOW Complexity : Zero comorbidities / personal factors that will impact the outcome / POCExam:LOW Complexity : 1-2 Standardized tests and measures addressing body structure, function, activity limitation and / or participation in recreation  Presentation: LOW Complexity : Stable, uncomplicated  Clinical Decision Making:Low Complexity amb >30' RW Overall Complexity:LOW

## 2017-04-04 NOTE — ROUTINE PROCESS
1255  TRANSFER - IN REPORT:    Verbal report received from Sebas Winter RN(name) on Branden Alegria  being received from [employer]) for routine post - op      Report consisted of patients Situation, Background, Assessment and   Recommendations(SBAR). Information from the following report(s) SBAR, Kardex and MAR was reviewed with the receiving nurse. Opportunity for questions and clarification was provided. Assessment completed upon patients arrival to unit and care assumed.

## 2017-04-04 NOTE — BRIEF OP NOTE
BRIEF OPERATIVE NOTE    Date of Procedure: 4/4/2017   Preoperative Diagnosis: PAIN DUE TO INTERNAL ORTHOPEDIC PROSTHETIC DEVICES, IMPLANTS & GRAFTS  Postoperative Diagnosis: PAIN DUE TO INTERNAL ORTHOPEDIC PROSTHETIC DEVICES, IMPLANTS & GRAFTS    Procedure(s):  REVISION RIGHT KNEE WITH OPEN POLY EXCHANGE & SCAR TISSUE EXCISION  Surgeon(s) and Role:     * Pedro Pablo Lindsay MD - Primary       Physician Assistant: Emma Loo    Surgical Staff:  Circ-1: Min Niño RN  Circ-Relief: Adrien Sandhu RN  Physician Assistant: NICHOL Loo  Scrub Tech-1: Baron Laureano  Scrub Tech-2: Consuelo Ortez  Retractor Castillo: Mitchel Klein  Event Time In   Incision Start 1045   Incision Close 1140     Anesthesia: General   Estimated Blood Loss: 75ml  Specimens:   ID Type Source Tests Collected by Time Destination   1 : Ni Bishop MD 4/4/2017 1055 Pathology      Findings: loose femoral component   Complications: none  Implants:   Implant Name Type Inv.  Item Serial No.  Lot No. LRB No. Used Action   CEMENT BNE MV SMARTSET 40GM --  - [id number]  CEMENT BNE MV SMARTSET 40GM --   [facility] DEPUY SPINE [id number] Right 1 Implanted   FEM UNI HP SIGMA SZ5 RM/LL --  - [id number]  FEM UNI HP SIGMA SZ5 RM/LL --   [employer] [id number] Right 1 Implanted   INSERT UNI HP SZ4 9MM RM/LL -- SIGMA - [id number]   INSERT UNI HP SZ4 9MM RM/LL -- SIGMA   [employer] [id number] Right 1 Implanted

## 2017-04-04 NOTE — PROGRESS NOTES
Speech Therapy Screening:  Services are not indicated at this time. An InBasket screening referral was triggered for speech therapy based on results obtained during the nursing admission assessment. The patients chart was reviewed and the patient is not appropriate for a skilled therapy evaluation at this time. Please consult speech therapy if any therapy needs arise. Thank you.     Brionna Smith, SLP

## 2017-04-04 NOTE — IP AVS SNAPSHOT
Current Discharge Medication List  
  
START taking these medications Dose & Instructions Dispensing Information Comments Morning Noon Evening Bedtime  
 aspirin 325 mg tablet Commonly known as:  ASPIRIN Your last dose was: Your next dose is:    
   
   
 Dose:  325 mg Take 1 Tab by mouth two (2) times a day for 21 days. Quantity:  42 Tab Refills:  0  
     
   
   
   
  
 meloxicam 7.5 mg tablet Commonly known as:  MOBIC Your last dose was: Your next dose is:    
   
   
 Dose:  7.5 mg Take 1 Tab by mouth two (2) times a day for 14 days. Quantity:  28 Tab Refills:  0  
     
   
   
   
  
 oxyCODONE-acetaminophen 5-325 mg per tablet Commonly known as:  PERCOCET Your last dose was: Your next dose is: Take 1-2 tablets by mouth every 4-6 hours as needed for pain. Quantity:  60 Tab Refills:  0 STOP taking these medications ALEVE 220 mg Cap Generic drug:  naproxen sodium Where to Get Your Medications Information on where to get these meds will be given to you by the nurse or doctor. ! Ask your nurse or doctor about these medications  
  aspirin 325 mg tablet  
 meloxicam 7.5 mg tablet  
 oxyCODONE-acetaminophen 5-325 mg per tablet

## 2017-04-04 NOTE — PERIOP NOTES
TRANSFER - OUT REPORT:    Verbal report given to Karina SALEH RN(name) on Carlene Mchugh  being transferred to  [address] [place], [place] [address]unit) for routine post - op       Report consisted of patients Situation, Background, Assessment and   Recommendations(SBAR). Information from the following report(s) OR Summary, Intake/Output and MAR was reviewed with the receiving nurse. Lines:   Peripheral IV 04/04/17 Left Forearm (Active)   Site Assessment Clean, dry, & intact 4/4/2017 12:15 PM   Phlebitis Assessment 0 4/4/2017 12:15 PM   Infiltration Assessment 0 4/4/2017 12:15 PM   Dressing Status Clean, dry, & intact 4/4/2017 12:15 PM   Dressing Type Transparent;Tape 4/4/2017 12:15 PM   Hub Color/Line Status Infusing 4/4/2017 12:15 PM        Opportunity for questions and clarification was provided.       Patient transported with:   Registered Nurse  Tech

## 2017-04-04 NOTE — PROGRESS NOTES
1300  Received client from PACU in satisfactory condition. Client is a pt of Dr. Hamilton Miller. Pt had a Revision  Right Total Knee Replacement today. Client is A/O X 3. Client is calm and cooperative. Clients PERRLA. Denies numbness or tingling to any extremity. With + Posterior Tibial and Dorsalis Pedis pulses. Capillary Refill less than 3 seconds. Skin is warm , dry and skin color is appropriate to race. Client is negative for JVD. Bibasilar breath sounds clear. No use of accessory muscles. Bowel sounds hypoactive to all quadrants. Abdomen is soft and non-tender. Client has not voided post-operatively. No bladder distention evident. No complaints of bladder discomfort. Client has a Ace wrap dressing to the right knee. Dressing is dry and intact. No other skin integrity issues present. Migue hose applied to left leg. Sequential compression device applied. Client has LFA 18 gauge PIV present and running NSS at 300 ml/hr. Clients pain is 0/10 on 0-10 scale. Client oriented to call bell use as well as bed use. Client oriented to phone and how to order meals. Call bell within reach. Bed in low position. Three side rails up. 1311  Pt voided 200 ml of clear yellow urine. 2:15 PM  Pt eating at this time. 1510  Pt  was seen by PT. Pt was out of bed. Ambulated to outside of room and part of hallway then back in bed. Denies pain . 1800  Pt ate dinner well. Voiding frequently .

## 2017-04-04 NOTE — PROGRESS NOTES
Met with pt and spouse at bedside. Pt anticipates discharge home 4/5 once he clears PT, with wife able to assist as needed. Pt has DME needed for home. FOC offered and pt chose [facility] [phone] for follow up; liaison aware and CMS will place referral with anticipated discharge tomorrow. Care Management Interventions  PCP Verified by CM:  Yes  Transition of Care Consult (CM Consult): [address]: Yes  Discharge Durable Medical Equipment: No (pt has RW)  Physical Therapy Consult: Yes  Occupational Therapy Consult: Yes  Current Support Network: Lives with Spouse, Own Home  Confirm Follow Up Transport: Family  Plan discussed with Pt/Family/Caregiver: Yes  Freedom of Choice Offered: Yes  Discharge Location  Discharge Placement: Home with home health

## 2017-04-04 NOTE — PERIOP NOTES
Bedside report to receiving nurse Patito Springer, current vital signs noted below. Dressing dry and intact. Family in waiting room. No further questions from receiving nurse.    Visit Vitals    /69    Pulse (!) 59    Temp 97.6 °F (36.4 °C)    Resp 12    Ht 5' 9.5\" (1.765 m)    Wt 116.3 kg (256 lb 8 oz)    SpO2 95%    BMI 37.34 kg/m2

## 2017-04-04 NOTE — ANESTHESIA PROCEDURE NOTES
Peripheral Block    Start time: 4/4/2017 9:28 AM  End time: 4/4/2017 9:32 AM  Performed by: Rupal Nazario  Authorized by: Efrain BAUTISTA       Pre-procedure: Indications: at surgeon's request and post-op pain management    Preanesthetic Checklist: patient identified, risks and benefits discussed, site marked, timeout performed and patient being monitored    Timeout Time: 09:28          Block Type:   Block Type: Adductor canal  Laterality:  Right  Monitoring:  Standard ASA monitoring, continuous pulse ox, frequent vital sign checks, heart rate, responsive to questions and oxygen  Injection Technique:  Single shot  Procedures: ultrasound guided    Patient Position: supine  Prep: chlorhexidine    Location:  Lower thigh  Needle Type:  Stimuplex  Needle Gauge:  20 G  Needle Localization:  Ultrasound guidance  Medication Injected:  0.5%  ropivacaine  Volume (mL):  15  Add'l Medication Injected:  2.0%  mepivacaine  Volume (mL):  10    Assessment:  Number of attempts:  1  Injection Assessment:  Incremental injection every 5 mL, local visualized surrounding nerve on ultrasound, negative aspiration for blood, no paresthesia, no intravascular symptoms and ultrasound image on chart  Patient tolerance:  Patient tolerated the procedure well with no immediate complications  Initially, positive aspirate heme. Removed/cleared and negative aspirate throughout.

## 2017-04-04 NOTE — ANESTHESIA PREPROCEDURE EVALUATION
Anesthetic History   No history of anesthetic complications            Review of Systems / Medical History  Patient summary reviewed, nursing notes reviewed and pertinent labs reviewed    Pulmonary        Sleep apnea: No treatment      Pertinent negatives: No asthma     Neuro/Psych   Within defined limits           Cardiovascular  Within defined limits                Exercise tolerance: >4 METS     GI/Hepatic/Renal       Hepatitis (resolved): type C      Pertinent negatives: No liver disease   Endo/Other        Arthritis     Other Findings              Physical Exam    Airway  Mallampati: II  TM Distance: 4 - 6 cm  Neck ROM: normal range of motion   Mouth opening: Normal     Cardiovascular               Dental  No notable dental hx       Pulmonary                 Abdominal         Other Findings            Anesthetic Plan    ASA: 2  Anesthesia type: general      Post-op pain plan if not by surgeon: peripheral nerve block single      Anesthetic plan and risks discussed with: Patient and Spouse      Adductor Canal Block - risks/benefits explained: infection, nerve injury, bleeding, failed block - he wishes to proceed.

## 2017-04-04 NOTE — PROGRESS NOTES
1940 Assessment complete at this time. Pt A&O x4. Lung sounds clear bilaterally. Pt has +PP bilaterally. Pt denies tingling and numbness in LE's. Pain reported a  0/10 on pain scale. ACE elastic dressing to RLE. Ice applied to surgical site. [place] to LLE, plexis bilaterally. 18G to left forearm patent and infusing. Skin warm and dry. Abdomen soft and non-tender. Bowel sounds active x4 quadrants WDL's. Patient resting with bed in lowest position. Call light in reach. 0345-Shift reassessment complete at this time. No changes noted to previous assessment. Ice reapplied to surgical site. See flow sheet for details. Pt resting with bed in lowest position. Call light in reach. 0350-CHG wipes applied at this time. Bed pad and gown changed. Pt tolerated well. End of shift summary. Pt had uneventful shift. Pt continues to deny pain. Pt ambulates x1 assist to restroom. Pt resting with no needs or signs of distress at this time. Call light in reach.

## 2017-04-04 NOTE — ANESTHESIA POSTPROCEDURE EVALUATION
Post-Anesthesia Evaluation and Assessment    Cardiovascular Function/Vital Signs  Visit Vitals    /76    Pulse 71    Temp 36.6 °C (97.8 °F)    Resp 10    Ht 5' 9.5\" (1.765 m)    Wt 116.3 kg (256 lb 8 oz)    SpO2 94%    BMI 37.34 kg/m2       Patient is status post Procedure(s):  REVISION RIGHT KNEE WITH OPEN POLY EXCHANGE & SCAR TISSUE EXCISION. Nausea/Vomiting: Controlled. Postoperative hydration reviewed and adequate. Pain:  Pain Scale 1: Numeric (0 - 10) (04/04/17 1210)  Pain Intensity 1: 0 (04/04/17 1210)   Managed. Neurological Status:   Neuro (WDL): Exceptions to WDL (04/04/17 1154)   At baseline. Mental Status and Level of Consciousness: Baseline and appropriate for discharge. Pulmonary Status:   O2 Device: Room air (04/04/17 1205)   Adequate oxygenation and airway patent. Complications related to anesthesia: None    Post-anesthesia assessment completed. No concerns. Patient has met all discharge requirements.     Signed By: Daniel Stokes MD    April 4, 2017

## 2017-04-04 NOTE — DISCHARGE SUMMARY
[address] [facility] [place] [address]   2 [facility] [address]     DISCHARGE SUMMARY     PATIENT: Deann Valdez     MRN: [id number]   ADMIT DATE: 2017   BILLIN   DISCHARGE DATE:      ATTENDING: Rachel Mendez MD   DICTATING: NICHOL Kruger         ADMISSION DIAGNOSIS: PAIN DUE TO INTERNAL ORTHOPEDIC PROSTHETIC DEVICES,IMPLANTS & GRAFTS  Pain due to unicompartmental arthroplasty of knee ([address]    DISCHARGE DIAGNOSIS: Status pos tREVISION RIGHT MEDIAL UNICONDYLAR KNEE ARTHROPLASTY    HISTORY OF PRESENT ILLNESS: The patient is a 49y.o. year-old male   with ongoing right knee pain following medial unicondylar knee arthroplasty. The patient's pain has persisted and progressed despite conservative treatments and therapies. The patient has at this time opted for surgical intervention. PAST MEDICAL HISTORY:   Past Medical History:   Diagnosis Date    Arthritis     mild- osteo    Asthma     exercise induced    Chronic pain     knees    Liver disease     Hepatitis C-clear now    Osteoarthritis of right knee 2014    Pain due to knee joint prosthesis ([address] 4/3/2017    Unspecified sleep apnea     has CPAP machine but hasn't used in 5 years       PAST SURGICAL HISTORY:   Past Surgical History:   Procedure Laterality Date    HX APPENDECTOMY  1992    HX GI  2012    blocked small intestine-resolved on its own    HX HEENT      T & A    HX KNEE ARTHROSCOPY Left     HX KNEE REPLACEMENT Right 2014   [facility]OP[facility]EDIC  2012    right knee scope x2    HX ROTATOR CUFF REPAIR Right        ALLERGIES: No Known Allergies     CURRENT MEDICATIONS:  A list of medications prior to the time of admission include:  Prior to Admission medications    Medication Sig Start Date End Date Taking? Authorizing Provider   aspirin (ASPIRIN) 325 mg tablet Take 1 Tab by mouth two (2) times a day for 21 days.  17 Yes NICHOL Kruger   meloxicam (MOBIC) 7.5 mg tablet Take 1 Tab by mouth two (2) times a day for 14 days. 4/4/17 4/18/17 Yes NICHOL Reyna   oxyCODONE-acetaminophen (PERCOCET) 5-325 mg per tablet Take 1-2 tablets by mouth every 4-6 hours as needed for pain. 4/4/17  Yes NICHOL Reyna   naproxen sodium (ALEVE) 220 mg cap Take 1 Tab by mouth every twelve (12) hours as needed (moderate to severe pain). Indications: Pain   Yes Historical Provider       FAMILY HISTORY: History reviewed. No pertinent family history. SOCIAL HISTORY:   Social History     Social History    Marital status:      Spouse name: N/A    Number of children: N/A    Years of education: N/A     Social History Main Topics    Smoking status: Never Smoker    Smokeless tobacco: Never Used    Alcohol use 1.0 oz/week     2 Cans of beer per week    Drug use: No    Sexual activity: Yes     Other Topics Concern    None     Social History Narrative       REVIEW OF SYSTEMS: All review of systems are negative. PHYSICAL EXAMINATION: For a detailed physical exam, please refer to the patient's chart. HOSPITAL COURSE: The patient was taken to surgery the day of admission. he underwent a revision right medial unicondylar knee replacement with revision of the femoral component. Operative course was benign. Estimated blood loss was approximately 100 cc. The patient was taken to the PACU in stable condition and was later taken to the floor in stable condition. During his hospital stay, the patient progressed well with physical therapy and occupational therapy, adherent to instructions. he is on a CPM machine per protocol.  he had been cleared by physical therapy with stair training. he was placed on Aspirin for DVT prophylaxis. his pain has been well controlled with oral pain medications. his vitals have remained stable. he has also remained hemodynamically stable. The patient has been recommended for discharge home. DISCHARGE INSTRUCTIONS: The patient is to be discharged home.  he is to continue on his prior medications per the medication reconciliation form, to which we will add:  1. Aspirin 325 mg; 1 tablet po bid x 21 days  2. Mobic 7.5 mg; 1 tablet po bid x 14 days  3. Percocet 5/325 mg; 1-2 tablets p.o. every 4 to 6 hours p.r.n. for pain    The patient is to continue at home with home physical therapy 3 times a week to work on gait training, range of motion, strengthening, and weightbearing exercises as tolerated on his right lower extremity. The patient is to continue to use the CPM machine from 0-60 degrees, increasing 10 degrees daily as tolerable. he may use the CPM machine for 2 hour sessions, 3 times daily. The patient is to progress from a walker to a cane to complete total weightbearing as tolerable. The patient is to continue to keep his incision dry. The patient is to followup with Dr. Maco Chow, [place] Serrano PA-C and/or [facility] PAMILLER in the office approximately 10-14 days status post for x-rays and further evaluation.     Emma Sommers 1723, [place]  04/05/17  7:12 AM

## 2017-04-04 NOTE — IP AVS SNAPSHOT
[address] [address] 
[phone] Patient: Branden Alegria MRN: [id number] HSQ:12/6/0257 You are allergic to the following No active allergies Recent Documentation Height Weight BMI Smoking Status 1.765 m 116.3 kg 37.34 kg/m2 Never Smoker Emergency Contacts Name Discharge Info Relation Home Work Mobile [address] [place] [address] CAREGIVER [3] Spouse [3]  [phone] About your hospitalization You were admitted on:  April 4, 2017 You last received care in the:  [facility] [address] You were discharged on:  April 5, 2017 Unit phone number:  [phone] Why you were hospitalized Your primary diagnosis was:  Pain Due To Knee Joint Prosthesis (Hcc) Your diagnoses also included:  Pain Due To Unicompartmental Arthroplasty Of Knee (Hcc) Providers Seen During Your Hospitalizations Provider Role Specialty Primary office phone Rhonda Blackwell MD Attending Provider Orthopedic Surgery [phone] Your Primary Care Physician (PCP) Primary Care Physician Office Phone Office Fax Radha Loleta [phone] Follow-up Information Follow up With Details Comments Contact Info Rhonda Blackwell MD On 4/19/2017 Follow up appointment @ 4:20pm [address] [place] [address] [address] 1700 [facility] 
[phone] Kanwal Blackwell MD   2017 [address] [place], [place] [id number] 
[phone] [address] to continue managing your healthcare needs. [phone] Current Discharge Medication List  
  
START taking these medications Dose & Instructions Dispensing Information Comments Morning Noon Evening Bedtime  
 aspirin 325 mg tablet Commonly known as:  ASPIRIN Your last dose was:     
   
Your next dose is:    
   
   
 Dose:  325 mg  
 Take 1 Tab by mouth two (2) times a day for 21 days. Quantity:  42 Tab Refills:  0  
     
   
   
   
  
 meloxicam 7.5 mg tablet Commonly known as:  MOBIC Your last dose was: Your next dose is:    
   
   
 Dose:  7.5 mg Take 1 Tab by mouth two (2) times a day for 14 days. Quantity:  28 Tab Refills:  0  
     
   
   
   
  
 oxyCODONE-acetaminophen 5-325 mg per tablet Commonly known as:  PERCOCET Your last dose was: Your next dose is: Take 1-2 tablets by mouth every 4-6 hours as needed for pain. Quantity:  60 Tab Refills:  0 STOP taking these medications ALEVE 220 mg Cap Generic drug:  naproxen sodium Where to Get Your Medications Information on where to get these meds will be given to you by the nurse or doctor. ! Ask your nurse or doctor about these medications  
  aspirin 325 mg tablet  
 meloxicam 7.5 mg tablet  
 oxyCODONE-acetaminophen 5-325 mg per tablet Discharge Instructions [address] [place] [address] Patient Discharge Instructions Susy Lung / [id number] : 1960 Admitted 2017 Discharged: 2017 IF YOU HAVE ANY PROBLEMS ONCE YOU ARE AT HOME CALL THE FOLLOWING NUMBERS:  
Main office number: [phone] Your follow up appointment to see either Dr. Viky Stahl PA-C, or [facility] as scheduled in 2 weeks. If you are unsure of your appointment date call the office at [phone]. Medication Instructions · Resume your home medictions as directed, you may have directed not to resume supplements until after your follow up. · A prescription for pain medication has been given · It is important that you take the medication exactly as they are prescribed.  
· Keep your medication in the bottles provided by the pharmacist and keep a list of the medication names, dosages, and times to be taken in your wallet. · Do not take other medications without consulting your doctor. What to do at [facility] Resume your prehospital diet. If you have excessive nausea or vomitting call your doctor's office. Be sure to maintain adequate fluid intake. Some pain medications may cause constipation. Remember to drink fluids, stay as active as possible, and eat plenty of fiber-rich foods. Begin In-Home Physical Therapy; 3 times a week to work on gait training, range of motion, strengthening, and weight bearing exercises as tolerable. Continue to use your walker or cane when walking. May progress from the walker to a cane to complete total bearing as tolerable. Patient may shower. Wrap incision with plastic wrap/covering to prevent incision from getting wet. Avoid complete immersion. YOUR DRESSING SHOULD BE CHANGED IN 3-5 DAYS BY [facility] NURSE. When to Call - Call if you have a temperature greater then 101 
- Unable to keep food down - Are unable to bear any wieght  
- Need a pain medication refill Information obtained by : 
I understand that if any problems occur once I am at home I am to contact my physician. I understand and acknowledge receipt of the instructions indicated above. Physician's or R.N.'s Signature                                                                  Date/Time Patient or Representative Signature                                                          Date/Time Discharge Orders None MyChart Announcement  We are excited to announce that we are making your provider's discharge notes available to you in [employer]. You will see these notes when they are completed and signed by the physician that discharged you from your recent hospital stay. If you have any questions or concerns about any information you see in [facility], please call the Health Information Department where you were seen or reach out to your Primary Care Provider for more information about your plan of care. Introducing \[facility] [place] [facility]\"" & HEALTH SERVICES! Dear Ochoa Centeno: 
Thank you for requesting a [employer] account. Our records indicate that you have previously registered for a [employer] account but its currently inactive. Please call our [employer] support line at [phone]. Additional Information If you have questions, please visit the Frequently Asked Questions section of the [employer] website at [email or web address] Remember, [employer] is NOT to be used for urgent needs. For medical emergencies, dial 911. Now available from your iPhone and Android! General Information Please provide this summary of care documentation to your next provider. Patient Signature:  ____________________________________________________________ Date:  ____________________________________________________________  
  
Ariadna Sweet Provider Signature:  ____________________________________________________________ Date:  ____________________________________________________________

## 2017-04-04 NOTE — PERIOP NOTES
Patient arrived to PACU at 1153  Report received from Austin Fraga CRNA and Severo Gals RN regarding patient . Surgeon(s):mayda and Procedure(s): verified   Confirmed with allergies and dressings discussed. Anesthesia type, drugs, patient history, complications, estimated blood loss, vital signs, intake and output, and last pain medication, lines, reversal medications and temperature were reviewed. PACU monitoring initiated. See doc flow sheet for detailed physical assessment.

## 2017-04-05 ENCOUNTER — HOME HEALTH ADMISSION (OUTPATIENT)
Dept: HOME HEALTH SERVICES | Facility: HOME HEALTH | Age: 57
End: 2017-04-05
Payer: COMMERCIAL

## 2017-04-05 VITALS
TEMPERATURE: 98.1 F | RESPIRATION RATE: 18 BRPM | HEART RATE: 53 BPM | OXYGEN SATURATION: 95 % | BODY MASS INDEX: 36.72 KG/M2 | WEIGHT: 256.5 LBS | SYSTOLIC BLOOD PRESSURE: 110 MMHG | HEIGHT: 70 IN | DIASTOLIC BLOOD PRESSURE: 80 MMHG

## 2017-04-05 LAB
ANION GAP BLD CALC-SCNC: 11 MMOL/L (ref 3–18)
BUN SERPL-MCNC: 17 MG/DL (ref 7–18)
BUN/CREAT SERPL: 16 (ref 12–20)
CALCIUM SERPL-MCNC: 8.5 MG/DL (ref 8.5–10.1)
CHLORIDE SERPL-SCNC: 109 MMOL/L (ref 100–108)
CO2 SERPL-SCNC: 24 MMOL/L (ref 21–32)
CREAT SERPL-MCNC: 1.06 MG/DL (ref 0.6–1.3)
ERYTHROCYTE [DISTWIDTH] IN BLOOD BY AUTOMATED COUNT: 13.3 % (ref 11.6–14.5)
GLUCOSE SERPL-MCNC: 128 MG/DL (ref 74–99)
HCT VFR BLD AUTO: 37.3 % (ref 36–48)
HGB BLD-MCNC: 12.7 G/DL (ref 13–16)
MCH RBC QN AUTO: 29.4 PG (ref 24–34)
MCHC RBC AUTO-ENTMCNC: 34 G/DL (ref 31–37)
MCV RBC AUTO: 86.3 FL (ref 74–97)
PLATELET # BLD AUTO: 168 K/UL (ref 135–420)
PMV BLD AUTO: 9.5 FL (ref 9.2–11.8)
POTASSIUM SERPL-SCNC: 4.1 MMOL/L (ref 3.5–5.5)
RBC # BLD AUTO: 4.32 M/UL (ref 4.7–5.5)
SODIUM SERPL-SCNC: 144 MMOL/L (ref 136–145)
WBC # BLD AUTO: 11.3 K/UL (ref 4.6–13.2)

## 2017-04-05 PROCEDURE — 74011250637 HC RX REV CODE- 250/637: Performed by: ORTHOPAEDIC SURGERY

## 2017-04-05 PROCEDURE — 36415 COLL VENOUS BLD VENIPUNCTURE: CPT | Performed by: PHYSICIAN ASSISTANT

## 2017-04-05 PROCEDURE — 97116 GAIT TRAINING THERAPY: CPT

## 2017-04-05 PROCEDURE — 85027 COMPLETE CBC AUTOMATED: CPT | Performed by: PHYSICIAN ASSISTANT

## 2017-04-05 PROCEDURE — 97165 OT EVAL LOW COMPLEX 30 MIN: CPT

## 2017-04-05 PROCEDURE — 80048 BASIC METABOLIC PNL TOTAL CA: CPT | Performed by: PHYSICIAN ASSISTANT

## 2017-04-05 PROCEDURE — 97535 SELF CARE MNGMENT TRAINING: CPT

## 2017-04-05 PROCEDURE — 74011250636 HC RX REV CODE- 250/636: Performed by: PHYSICIAN ASSISTANT

## 2017-04-05 PROCEDURE — 74011250637 HC RX REV CODE- 250/637: Performed by: PHYSICIAN ASSISTANT

## 2017-04-05 RX ADMIN — PANTOPRAZOLE SODIUM 40 MG: 40 TABLET, DELAYED RELEASE ORAL at 09:47

## 2017-04-05 RX ADMIN — ACETAMINOPHEN 650 MG: 325 TABLET ORAL at 04:10

## 2017-04-05 RX ADMIN — DOCUSATE SODIUM 100 MG: 100 CAPSULE, LIQUID FILLED ORAL at 09:47

## 2017-04-05 RX ADMIN — ACETAMINOPHEN 325 MG: 325 TABLET ORAL at 10:24

## 2017-04-05 RX ADMIN — SODIUM CHLORIDE 125 ML/HR: 900 INJECTION, SOLUTION INTRAVENOUS at 04:01

## 2017-04-05 RX ADMIN — OXYCODONE HYDROCHLORIDE 5 MG: 5 TABLET ORAL at 07:30

## 2017-04-05 RX ADMIN — FERROUS SULFATE TAB 325 MG (65 MG ELEMENTAL FE) 325 MG: 325 (65 FE) TAB at 09:47

## 2017-04-05 RX ADMIN — VANCOMYCIN HYDROCHLORIDE 1750 MG: 10 INJECTION, POWDER, LYOPHILIZED, FOR SOLUTION INTRAVENOUS at 10:54

## 2017-04-05 RX ADMIN — ASPIRIN 325 MG: 325 TABLET, FILM COATED ORAL at 09:47

## 2017-04-05 RX ADMIN — OXYCODONE HYDROCHLORIDE 10 MG: 5 TABLET ORAL at 11:40

## 2017-04-05 RX ADMIN — MELOXICAM 7.5 MG: 7.5 TABLET ORAL at 09:47

## 2017-04-05 NOTE — OP NOTES
[address] [place] [address]  OPERATIVE REPORT    Name:  Ish Purdy  MR#:  [id number]  :  1960  Account #:  [de-identified]  Date of Adm:  2017  Date of Surgery:  2017      PREOPERATIVE DIAGNOSIS: Chronic knee pain, status post right  unicompartmental knee arthroplasty. POSTOPERATIVE DIAGNOSIS: Chronic knee pain, status post right  unicompartmental knee arthroplasty, with loose femoral component. PROCEDURES PERFORMED: Revision right unicompartmental knee  with revision of the femoral component and the polyethylene. SURGEON: Rachel Mendez MD    ASSISTANT: Gerber Youngblood PA-C    ESTIMATED BLOOD LOSS: 50 mL. SPECIMENS REMOVED: Tissue to Pathology for examination. ANESTHESIA: General with adductor canal block. INDICATIONS: The patient is a 62-year-old male who is close to 2  years status post right unicompartmental knee arthroplasty with  chronic medial knee pain. He had been worked up extensively in the  office including multiple laboratory data, multiple aspirations which  were all negative for infection, as well as a bone scan which was  negative for loosening. Serial radiographs did not show any obvious  evidence of loosening. Because of chronic knee pain, he was indicated  for exploration of the knee. DESCRIPTION OF PROCEDURE: After the patient was brought to the  operating suite, he was placed supine on the operating room table and  effectively anesthetized using general anesthesia. He had been  anesthetized in the preop holding area with an adductor canal block. His right knee was then prepped and draped in the normal sterile  orthopedic fashion. He was given appropriate antibiotic IV  preoperatively. After proper site identification, his previous skin incision  was incised through skin and subcutaneous tissue. A medial  arthrotomy was performed and the knee joint was entered. There was  just a little bit of synovial fluid, but not abundant.  The components on  first inspection appeared to be well fixed and in good position. There  was a little bit of hypertrophic synovium, but really not severe. The  knee appeared to be tracking nicely. The patellofemoral and lateral  compartments were still well preserved. At this point, the polyethylene  spacer was removed. The back of the knee was inspected. There did  not appear to be any loose bodies or any large meniscal remnant or  anything that could be the source of his pain. A curette was then used  to start to remove some of the hypertrophic synovium around the  edges of the components and in doing so, when the curette was  placed behind the femur, the femoral component really just fell right off  the medial femoral condyle. The curette was placed around the tibial  component, but the tibial component was well fixed and not loose. At  this point, the medial femoral condyle was debrided using curettes and  a rongeur to remove all fibrous tissue. There was just a couple of  pieces of cement that were actually in the lug holes, but no cement had  actually really adhered to the medial femoral condyle and most of it  was attached to the femoral component. There appeared to be enough  bony stalk that we could just recement on a new femoral component. The knee was irrigated copiously and then dried. A new #5 DePuy  preservation femur was then cemented in place using DePuy #2  cement and a new polyethylene spacer. A 4 x 9 mm spacer was then  placed on the tibia and the knee was held in essentially full extension,  perhaps 5 degrees of flexion, holding the femoral component in place  while the cement hardened. Excess cement was removed with a  plastic curette. Once cement was hardened, the knee was ranged with  good tracking, excellent stability and the femoral component appeared  to be well fixed to both direct visualization as well as probing.  The knee  was then irrigated copiously and then closed in layers with the deep  fascia closed with a #2 Stratafix suture in a running type stitch. Subcutaneous tissue was closed with 2-0 Vicryl in a simple buried  stitch and the skin was closed with Prineo. Dry sterile dressing was  then applied. Webril and Ace were applied. He tolerated this well, was  transferred to the bed and taken to the recovery room extubated in  stable condition. All sponge and needle counts were correct.         Shiva Easton MD    [facility] / Aneta Hull  D:  04/04/2017   16:05  T:  04/04/2017   21:16  Job #:  [id number]

## 2017-04-05 NOTE — ROUTINE PROCESS
Bedside and verbal shift change report given to ARNULFO Odell RN (oncoming nurse) by SAIRA Moore RN (offgoing nurse). Report included the following information SBAR, Kardex and MAR.

## 2017-04-05 NOTE — PROGRESS NOTES
1445 - Discharge instructions reviewed with patient at this time. Opportunity for questions and clarification was provided. Patient has verbalized understanding. Patient was provided with care notes to include side effects of RX's. Arm bands removed and shredded. AVS reviewed with Mari Saravia. IV removed. Dressing CDI.

## 2017-04-05 NOTE — PROGRESS NOTES
Problem: Self Care Deficits Care Plan (Adult)  Goal: *Acute Goals and Plan of Care (Insert Text)  Outcome: Resolved/Met Date Met:  04/05/17  OCCUPATIONAL THERAPY EVALUATION/DISCHARGE     Patient: Katharina Charles (65 y.o. male)  Date: 4/5/2017  Primary Diagnosis: PAIN DUE TO INTERNAL ORTHOPEDIC PROSTHETIC DEVICES,IMPLANTS & GRAFTS  Pain due to unicompartmental arthroplasty of knee (HCC)  Procedure(s) (LRB):  REVISION RIGHT KNEE WITH OPEN POLY EXCHANGE & SCAR TISSUE EXCISION (Right) 1 Day Post-Op   Precautions: Fall, WBAT      ASSESSMENT AND RECOMMENDATIONS:  Based on the objective data described below, the patient presents with deficits in the areas of functional mobility, functional transfers, decreased balance during ADLs, and overall decreased activity tolerance at this time. Patient sitting up in chair upon start of session with a reported pain level of 2/10. Patient able to perform all LB dressing with supervision seated without AE. Patient ambulated to/from bathroom with RW and supervision, and transferred on/off toilet with supervision. Patient stood at commode in order to void with RW and supervision. Patient stood at sink with RW and supervision in order to perform hand hygiene with no LOB. Patient demonstrated good safety awareness. Reviewed home safety and adaptive dressing techniques with patient verbalizing and demonstrating understanding at this time. Patient returned to sitting up in chair with all other needs within reach at this time. Recommend home health upon discharge. Skilled occupational therapy is not indicated at this time. Discharge Recommendations: Home Health  Further Equipment Recommendations for Discharge: shower chair        SUBJECTIVE:   Patient stated This is my second surgery.       OBJECTIVE DATA SUMMARY:       Past Medical History:   Diagnosis Date    Arthritis       mild- osteo    Asthma       exercise induced    Chronic pain       knees    Liver disease 1998     Hepatitis C-clear now    Osteoarthritis of right knee 12/14/2014    Pain due to knee joint prosthesis ([address] 4/3/2017    Unspecified sleep apnea       has CPAP machine but hasn't used in 5 years     Past Surgical History:   Procedure Laterality Date    HX APPENDECTOMY   1992    HX GI   4/2012     blocked small intestine-resolved on its own    HX HEENT         T & A    HX KNEE ARTHROSCOPY Left      HX KNEE REPLACEMENT Right 02/2014   [facility] ORTHOPAEDIC   6-     right knee scope x2    HX ROTATOR CUFF REPAIR Right       Barriers to Learning/Limitations: None  Compensate with: visual, verbal, tactile, kinesthetic cues/model  Prior Level of Function/Home Situation: Patient reported that he was independent with functional mobility, functional transfers, and ADLs prior to admission. Home Situation  Home Environment: Private residence  # Steps to Enter: 5  Rails to Enter: Yes  Hand Rails : Bilateral  One/Two Story Residence: Two story, live on 1st floor  # of Interior Steps: 15  Height of Each Step (in): 8 inches  Interior Rails: Both  Lift Chair Available: No  Living Alone: No  Support Systems: Spouse/Significant Other/Partner  Patient Expects to be Discharged to[de-identified] Private residence  Current DME Used/Available at Home: [place], straight, Walker, rolling  Tub or Shower Type: Shower  [X]     Right hand dominant       [ ]     Left hand dominant  Cognitive/Behavioral Status:  Neurologic State: Alert; Appropriate for age  Orientation Level: Oriented X4  Cognition: Appropriate for age attention/concentration; Follows commands  Safety/Judgement: Awareness of environment  Vision/Perceptual:    Corrective Lenses: Glasses  Coordination:  Coordination: Within functional limits  Fine Motor Skills-Upper: Left Intact; Right Intact    Gross Motor Skills-Upper: Left Intact; Right Intact  Balance:  Sitting: Intact  Standing: Intact; With support  Strength:  Strength:  Within functional limits  Tone & Sensation:  Tone: Normal  Sensation: Intact  Range of Motion:  AROM: Within functional limits  PROM: Within functional limits  Functional Mobility and Transfers for ADLs:  Bed Mobility:  Not tested this date. Transfers:  Sit to Stand: Supervision              Toilet Transfer : Supervision  ADL Assessment:  Oral Facial Hygiene/Grooming: Supervision (standing at sink to wash hands)  Lower Body Dressing: Supervision (seated to don pants without AE)  Toileting: Supervision (standing to void)  ADL Intervention:  Cognitive Retraining  Safety/Judgement: Awareness of environment  Pain:  Pain Scale 1: Numeric (0 - 10)  Pain Intensity 1: 2  Pain Location 1: Knee  Pain Orientation 1: Right  Pain Description 1: Aching  Pain Intervention(s) 1: Medication (see MAR)  Activity Tolerance:   Patient tolerated session with no reports of increased pain or fatigue this date. Please refer to the flowsheet for vital signs taken during this treatment. After treatment:   [X]  Patient left in no apparent distress sitting up in chair  [ ]  Patient left in no apparent distress in bed  [X]  Call bell left within reach  [ ]  Nursing notified  [ ]  Caregiver present  [ ]  Bed alarm activated      COMMUNICATION/EDUCATION:   Communication/Collaboration:  [X]      Home safety education was provided and the patient/caregiver indicated understanding. [X]      Patient/family have participated as able and agree with findings and recommendations. [ ]      Patient is unable to participate in plan of care at this time. Angie Lizarraga, OTR/L  Time Calculation: 26 mins     G-Codes (GP)  Self Care   Current  CI= 1-19%   Goal  CI= 1-19%   D/C  CI= 1-19%  The severity rating is based on the Level of Assistance required for Functional Mobility and ADLs.

## 2017-04-05 NOTE — PROGRESS NOTES
Problem: Mobility Impaired (Adult and Pediatric)  Goal: *Acute Goals and Plan of Care (Insert Text)  In 1-7 days pt will be able to perform:  ST. Bed mobility: Rolling L to R to L modified independent for positioning. 2. Supine to sit to supine S with HR for meals. 3. Sit to stand to sit S with RW in prep for ambulation. LT. Gait: Ambulate >75+ft S with RW, WBAT, for home/community mobility. (Goal Adjusted 17)  2. Stair Negotiation: Ascend/descend >3 steps CGA with HR for home entry. 3. Activity tolerance: Tolerate up in chair 1-2 hours for ADLs. 4. Patient/Family Education: Patient/family to be independent with HEP for follow-up care and safe discharge. Outcome: Resolved/Met Date Met:  17  PHYSICAL THERAPY TREATMENT/DISCHARGE     Patient: Currie Sicard (13 y.o. 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 ASSESSMENT:  Pt demonstrates ability to complete functional mobility tasks with supervision, including gt level surface with RW/WBAT R LE. Demonstrates reciprocal gt pattern and no buckling; distance limited to 80 ft d/t RW misfunction at this time. Pt instructed in and demonstrates step nego with S/CGA bilat and R HR. Pt has met goals and ready for transition to next level of care. Note pt states he was advised to not perform HEP at this time until seen by surgeon for f/u visit. Spouse confirms same. HEP previously issued for future use.   Progression toward goals:  [X]      Goals met  [ ]      Improving appropriately and progressing toward goals  [ ]      Improving slowly and progressing toward goals  [ ]      Not making progress toward goals and plan of care will be adjusted       PLAN:  Patient will be discharged from physical therapy at this time. Rationale for discharge:  [X] Goals Achieved  [ ] Gabo Romeo  [ ] Patient not participating in therapy  [ ] Other:  Discharge Recommendations:  Home Health  Further Equipment Recommendations for Discharge:  N/A       SUBJECTIVE:   Patient stated I'm ready.       OBJECTIVE DATA SUMMARY:   Critical Behavior:  Neurologic State: Alert, Appropriate for age  Orientation Level: Oriented X4  Cognition: Appropriate for age attention/concentration, Follows commands  Safety/Judgement: Awareness of environment  Functional Mobility Training:  Bed Mobility:  See OT note     Transfers:  Sit to Stand: Supervision  Stand to Sit: Supervision  Balance:  Sitting: Intact  Standing: Intact; With support  Ambulation/Gait Training:  Distance (ft): 80 Feet (ft)  Assistive Device: Gait belt;Walker, rolling  Ambulation - Level of Assistance: Supervision  Gait Abnormalities: Antalgic; Step to gait (progressed to reciprocal)  Right Side Weight Bearing: As tolerated  Speed/Susie: Pace decreased (<100 feet/min)  Swing Pattern: Left asymmetrical;Right asymmetrical  Interventions: Safety awareness training;Verbal cues; Visual/Demos  Stairs:  Number of Stairs Trained: 5  Stairs - Level of Assistance: Supervision;Contact guard assistance              Rail Use: Both (and right)  Therapeutic Exercises:   HEP instruction per protocol and pt with comprehension of same  Pain:  Pain Scale 1: Numeric (0 - 10)  Pain Intensity 1: 2  Pain Location 1: Knee  Pain Orientation 1: Right  Pain Description 1: Aching  Pain Intervention(s) 1: Medication (see MAR)  Activity Tolerance:   Good   Please refer to the flowsheet for vital signs taken during this treatment.   After treatment:   [X] Patient left in no apparent distress sitting up in chair  [ ] Patient left in no apparent distress in bed  [X] Call bell left within reach  [X] Nursing notified  [X] Caregiver present  [ ] Bed alarm activated  Jackelyn Patel, PT   Time Calculation: 19 mins

## 2017-04-05 NOTE — ROUTINE PROCESS
1945  Bedside and Verbal shift change report given to Daiana Mack RN (oncoming nurse) by Gab Espinosa RN (offgoing nurse). Report included the following information SBAR, Kardex and MAR.

## 2017-04-05 NOTE — DISCHARGE INSTRUCTIONS
[address] [facility] [address]   Patient Discharge Instructions    Flakito Prather / [id number] : 1960    Admitted 2017 Discharged: 2017     IF YOU HAVE ANY PROBLEMS ONCE YOU ARE AT HOME CALL THE FOLLOWING NUMBERS:   Main office number: [phone]    Your follow up appointment to see either Dr. Betty Mendes, Shola Lindsay PA-C, or [facility] ASHIA as scheduled in 2 weeks. If you are unsure of your appointment date call the office at [phone]. Medication Instructions     · Resume your home medictions as directed, you may have directed not to resume supplements until after your follow up. · A prescription for pain medication has been given   · It is important that you take the medication exactly as they are prescribed. · Keep your medication in the bottles provided by the pharmacist and keep a list of the medication names, dosages, and times to be taken in your wallet. · Do not take other medications without consulting your doctor. What to do at [address] [place] [address] Ave your prehospital diet. If you have excessive nausea or vomitting call your doctor's office. Be sure to maintain adequate fluid intake. Some pain medications may cause constipation. Remember to drink fluids, stay as active as possible, and eat plenty of fiber-rich foods. Begin In-Home Physical Therapy; 3 times a week to work on gait training, range of motion, strengthening, and weight bearing exercises as tolerable. Continue to use your walker or cane when walking. May progress from the walker to a cane to complete total bearing as tolerable. Patient may shower. Wrap incision with plastic wrap/covering to prevent incision from getting wet. Avoid complete immersion. YOUR DRESSING SHOULD BE CHANGED IN 3-5 DAYS BY [facility] NURSE.       When to Call    - Call if you have a temperature greater then 101  - Unable to keep food down  - Are unable to bear any wieght   - Need a pain medication refill     Information obtained by :  I understand that if any problems occur once I am at home I am to contact my physician. I understand and acknowledge receipt of the instructions indicated above.                                                                                                                                            Physician's or R.N.'s Signature                                                                  Date/Time                                                                                                                                              Patient or Representative Signature                                                          Date/Time

## 2017-04-05 NOTE — PROGRESS NOTES
Progress Note        Patient: Nadeem Kim MRN: [id number]  SSN: [id number]    YOB: 1960  Age: 49 y.o. 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 No complaints. No SOB. No Chest Pain. No Nausea or Vomiting. No problems eating or voiding. Objective:        Temp (24hrs), Av.9 °F (36.6 °C), Min:97.6 °F (36.4 °C), Max:98.3 °F (36.8 °C)    Body mass index is 37.34 kg/(m^2). Patient Vitals for the past 12 hrs:   BP Temp Pulse Resp SpO2   17 0717 119/69 97.8 °F (36.6 °C) (!) 53 18 99 %   17 0350 101/66 98 °F (36.7 °C) (!) 51 18 97 %   17 2350 100/58 98.3 °F (36.8 °C) (!) 59 16 96 %   17 1934 115/67 98.2 °F (36.8 °C) 62 16 94 %     Recent Labs      17   0340   HGB  12.7*   HCT  37.3   NA  144   K  4.1   CL  109*   CO2  24   BUN  17   CREA  1.06   GLU  128*       Physical Exam:  Vital Signs are Stable. No Acute Distress. Alert and Oriented. Negative Homans sign. Toes AROM Full. Neurovascular exam is normal.    Dressing is Clean, Dry, and Intact. Assessment/Plan:     1. Continue oob/rehab  2.  D/c planning    Continue PT/OT  Continue CPM/ROM    Discharge Plan: Home    Signed By: Nithya Dave MD     2017

## 2017-04-06 ENCOUNTER — HOME CARE VISIT (OUTPATIENT)
Dept: SCHEDULING | Facility: HOME HEALTH | Age: 57
End: 2017-04-06
Payer: COMMERCIAL

## 2017-04-06 PROCEDURE — G0299 HHS/HOSPICE OF RN EA 15 MIN: HCPCS

## 2017-04-06 PROCEDURE — 400013 HH SOC

## 2017-04-07 ENCOUNTER — HOME CARE VISIT (OUTPATIENT)
Dept: HOME HEALTH SERVICES | Facility: HOME HEALTH | Age: 57
End: 2017-04-07
Payer: COMMERCIAL

## 2017-04-07 PROCEDURE — A6213 FOAM DRG >16<=48 SQ IN W/BDR: HCPCS

## 2017-04-08 ENCOUNTER — HOME CARE VISIT (OUTPATIENT)
Dept: SCHEDULING | Facility: HOME HEALTH | Age: 57
End: 2017-04-08
Payer: COMMERCIAL

## 2017-04-08 VITALS
RESPIRATION RATE: 16 BRPM | SYSTOLIC BLOOD PRESSURE: 120 MMHG | DIASTOLIC BLOOD PRESSURE: 70 MMHG | OXYGEN SATURATION: 97 % | HEART RATE: 72 BPM

## 2017-04-08 PROCEDURE — G0299 HHS/HOSPICE OF RN EA 15 MIN: HCPCS

## 2017-04-12 ENCOUNTER — HOME CARE VISIT (OUTPATIENT)
Dept: HOME HEALTH SERVICES | Facility: HOME HEALTH | Age: 57
End: 2017-04-12
Payer: COMMERCIAL

## 2017-04-12 ENCOUNTER — HOME CARE VISIT (OUTPATIENT)
Dept: SCHEDULING | Facility: HOME HEALTH | Age: 57
End: 2017-04-12
Payer: COMMERCIAL

## 2017-04-12 VITALS
SYSTOLIC BLOOD PRESSURE: 128 MMHG | TEMPERATURE: 98.2 F | HEART RATE: 60 BPM | DIASTOLIC BLOOD PRESSURE: 74 MMHG | OXYGEN SATURATION: 99 %

## 2017-04-12 PROCEDURE — G0299 HHS/HOSPICE OF RN EA 15 MIN: HCPCS

## 2017-04-18 ENCOUNTER — HOME CARE VISIT (OUTPATIENT)
Dept: HOME HEALTH SERVICES | Facility: HOME HEALTH | Age: 57
End: 2017-04-18
Payer: COMMERCIAL

## 2017-04-19 ENCOUNTER — HOME CARE VISIT (OUTPATIENT)
Dept: HOME HEALTH SERVICES | Facility: HOME HEALTH | Age: 57
End: 2017-04-19
Payer: COMMERCIAL

## 2017-04-28 ENCOUNTER — HOSPITAL ENCOUNTER (OUTPATIENT)
Dept: PHYSICAL THERAPY | Age: 57
Discharge: HOME | End: 2017-04-28
Payer: COMMERCIAL

## 2017-04-28 PROCEDURE — 97535 SELF CARE MNGMENT TRAINING: CPT

## 2017-04-28 PROCEDURE — 97162 PT EVAL MOD COMPLEX 30 MIN: CPT

## 2017-04-28 NOTE — PROGRESS NOTES
[address] [place] [address], Suha Lyon [address] [place] [address], [facility] - Phone: [phone]  Fax: [phone] / [facility]  Patient Name: Heather Barriga : 1960   Medical   Diagnosis: Right knee pain [M25.561] Treatment Diagnosis: Right knee pain [M25.561]   Onset Date: 17     Referral Source: Kristal Galindo MD [facility] of Care [facility] [place]): 2017   Prior Hospitalization: See medical history Provider #: [id number]   Prior Level of Function: Difficulty with prolonged walking   Comorbidities: Hepatitis   Medications: Verified on Patient Summary List   The Plan of Care and following information is based on the information from the initial evaluation.   ===========================================================================================  Assessment / key information:  Heather Barriga is a 49 y.o.  yo male with Dx of Right knee pain [M25.561]. He reports R TKA on 17. He currently rates his pain as 5/10 at worst, 0/10 at best, primarily located at anterior aspect of his R knee. He complains of difficulty and increase pain with prolonged walking. Objective Findings:  R Knee AROM: 120 deg. Manual Muscle Testing:  Quad Set: R = Fair , L = Normal.  All other LE strength are WNL. Pt instructed in HEP and will f/u in clinic for PT.  ===========================================================================================  Eval Complexity: History MEDIUM  Complexity : 1-2 comorbidities / personal factors will impact the outcome/ POC ;  Examination  MEDIUM Complexity : 3 Standardized tests and measures addressing body structure, function, activity limitation and / or participation in recreation ; Presentation MEDIUM Complexity : Evolving with changing characteristics ;   Decision Making MEDIUM Complexity : FOTO score of 26-74; Overall Complexity MEDIUM  Problem List: pain affecting function, decrease ROM, decrease strength, impaired gait/ balance, decrease ADL/ functional abilitiies, decrease activity tolerance and decrease flexibility/ joint mobility   Treatment Plan may include any combination of the following: Therapeutic exercise, Therapeutic activities, Neuromuscular re-education, Physical agent/modality, Gait/balance training, Manual therapy, Patient education, Self Care training and Functional mobility training  Patient / Family readiness to learn indicated by: asking questions, trying to perform skills and interest  Persons(s) to be included in education: patient (P)  Barriers to Learning/Limitations: no  Measures taken: FOTO = 51%   Patient Goal (s): Decrease pain    Patient self reported health status: good  Rehabilitation Potential: good   Short Term Goals: To be accomplished in  1-2  weeks:  1. Independent with HEP. 2. Decrease max pain 25-50% to assist with amb   Long Term Goals: To be accomplished in  3-4  weeks:  1. Decrease max pain 50-75% to assist with amb  2. Increase FOTO score to 67% to show functional improvment. 3.  Will rate  >/= +5 on Global Rating of Change and be prepared to DC to HEP. Frequency / Duration:   Patient to be seen  2-3  times per week for 3-4  weeks:  Patient / Caregiver education and instruction: self care and exercises    Therapist Signature: Mckenzie Vaca, ADRIANT, OCS, SCS, CSCS Date: 1/46/1690   Certification Period: na Time: 8:33 AM   ===========================================================================================  I certify that the above Physical Therapy Services are being furnished while the patient is under my care. I agree with the treatment plan and certify that this therapy is necessary. Physician Signature:        Date:       Time:     Please sign and return to [facility] or you may fax the signed copy to [phone]. Thank you.

## 2017-04-28 NOTE — PROGRESS NOTES
PHYSICAL THERAPY - DAILY TREATMENT NOTE    Patient Name: Currie Sicard        Date: 2017  : 1960   YES Patient  Verified  Visit #:     Insurance: Payor: Anuradha Hutchinson / Plan: Israel Fuchs / Product Type: [employer] /      In time: 8:05 Out time: 8:25   Total Treatment Time: 20     Medicare Time Tracking (below)   Total Timed Codes (min):  na 1:1 Treatment Time:  na     TREATMENT AREA =  Right knee pain [M25.561]    SUBJECTIVE  Pain Level (on 0 to 10 scale):    Medication Changes/New allergies or changes in medical history, any new surgeries or procedures? NO    If yes, update Summary List   Subjective Functional Status/Changes:  []  No changes reported     SEE IE          OBJECTIVE     min Patient Education:  YES  Reviewed HEP   []  Progressed/Changed HEP based on: Other Objective/Functional Measures:    SEE IE     Post Treatment Pain Level (on 0 to 10) scale:       ASSESSMENT  Assessment/Changes in Function:     SEE IE     []  See Progress Note/Recertification   Patient will continue to benefit from skilled PT services to modify and progress therapeutic interventions, address functional mobility deficits, address ROM deficits, address strength deficits, analyze and address soft tissue restrictions, analyze and cue movement patterns, analyze and modify body mechanics/ergonomics and assess and modify postural abnormalities to attain remaining goals. Progress toward goals / Updated goals:         PLAN  []  Upgrade activities as tolerated YES Continue plan of care   []  Discharge due to :    []  Other:      Therapist: Jennifer Myers, PT, OCS, SCS, CSCS    Date: 2017 Time: 8:02 AM       No future appointments.

## 2017-05-01 ENCOUNTER — HOSPITAL ENCOUNTER (OUTPATIENT)
Dept: PHYSICAL THERAPY | Age: 57
Discharge: HOME | End: 2017-05-01
Payer: COMMERCIAL

## 2017-05-01 PROCEDURE — 97140 MANUAL THERAPY 1/> REGIONS: CPT

## 2017-05-01 PROCEDURE — 97110 THERAPEUTIC EXERCISES: CPT

## 2017-05-01 NOTE — PROGRESS NOTES
PHYSICAL THERAPY - DAILY TREATMENT NOTE    Patient Name: Tito Cheatham        Date: 2017  : 1960   YES Patient  Verified  Visit #:      12  Insurance: Payor: Theo Cordon / Plan: Dafne Elias / Product Type: [employer] /      In time: 325 Out time: 415   Total Treatment Time: 50     Medicare Time Tracking (below)   Total Timed Codes (min):  40 1:1 Treatment Time:       TREATMENT AREA =  Right knee pain [M25.561]    SUBJECTIVE  Pain Level (on 0 to 10 scale):  1  / 10   Medication Changes/New allergies or changes in medical history, any new surgeries or procedures? NO    If yes, update Summary List   Subjective Functional Status/Changes:  []  No changes reported     Felt fine after the eval.  Was told he could go up/down stairs, so he did. [place] very sore the next day, but was fine the following day. OBJECTIVE  Modalities Rationale:     decrease inflammation and decrease pain to improve patient's ability to ambulate. min [] Estim, type/location:                                      []  att     []  unatt     []  w/US     []  w/ice    []  w/heat    min []  Mechanical Traction: type/lbs                   []  pro   []  sup   []  int   []  cont    []  before manual    []  after manual    min []  Ultrasound, settings/location:      min []  Iontophoresis w/ dexamethasone, location:                                               []  take home patch       []  in clinic   10 min [x]  Ice     []  Heat    location/position: Supine, (R) knee. min []  Vasopneumatic Device, press/temp:     min []  Other:    [x] Skin assessment post-treatment (if applicable):    [x]  intact    []  redness- no adverse reaction     []redness - adverse reaction:        25 min Therapeutic Exercise:  [x]  See flow sheet   Rationale:      increase ROM, increase strength, improve coordination and improve balance to improve the patients ability to ambulate.       15 Min Manual Therapy: Scar massage, retro massage, (R) knee. Rationale:      decrease pain, increase ROM, increase tissue extensibility and decrease trigger points to improve patient's ability to ambulate. min Patient Education:  YES  Reviewed HEP   []  Progressed/Changed HEP based on: Other Objective/Functional Measures: Added multiple exercises to improve LE strength, stability, and flexibility to return to PLOF. Post Treatment Pain Level (on 0 to 10) scale:   0  / 10     ASSESSMENT  Assessment/Changes in Function:     Good tolerance to initial exercises. []  See Progress Note/Recertification   Patient will continue to benefit from skilled PT services to modify and progress therapeutic interventions, address functional mobility deficits, address ROM deficits, address strength deficits, analyze and address soft tissue restrictions, analyze and cue movement patterns, analyze and modify body mechanics/ergonomics and assess and modify postural abnormalities to attain remaining goals. Progress toward goals / Updated goals:    Initiated therex.       PLAN  [x]  Upgrade activities as tolerated YES Continue plan of care   []  Discharge due to :    []  Other:      Therapist: Riccardo Mckeon PTA    Date: 5/1/2017 Time: 3:32 PM     Future Appointments  Date Time Provider Brice Bush   5/3/2017 3:30 PM Riccardo Mckeon PTA [facility]   5/8/2017 3:30 PM Riccardo Mckeon PTA [facility]   5/10/2017 3:30 PM Riccardo Mckeon PTA [facility]   5/15/2017 3:00 PM Riccardo Mckeon PTA [facility]   5/17/2017 3:00 PM Riccardo Mckeon PTA [facility]   5/22/2017 3:30 PM Riccardo Mckeon PTA [facility]   5/24/2017 3:30 PM Riccardo Mckeon PTA [facility]

## 2017-05-03 ENCOUNTER — HOSPITAL ENCOUNTER (OUTPATIENT)
Dept: PHYSICAL THERAPY | Age: 57
Discharge: HOME | End: 2017-05-03
Payer: COMMERCIAL

## 2017-05-03 PROCEDURE — 97140 MANUAL THERAPY 1/> REGIONS: CPT

## 2017-05-03 PROCEDURE — 97110 THERAPEUTIC EXERCISES: CPT

## 2017-05-03 NOTE — PROGRESS NOTES
PHYSICAL THERAPY - DAILY TREATMENT NOTE    Patient Name: Angelica Francis        Date: 5/3/2017  : 1960   YES Patient  Verified  Visit #:   3   of   12  Insurance: Payor: Delia Like / Plan: Rita Carlson / Product Type: [employer] /      In time: 330 Out time: 420   Total Treatment Time: 50     Medicare Time Tracking (below)   Total Timed Codes (min):  40 1:1 Treatment Time:       TREATMENT AREA =  Right knee pain [M25.561]    SUBJECTIVE  Pain Level (on 0 to 10 scale):  1  / 10   Medication Changes/New allergies or changes in medical history, any new surgeries or procedures? NO    If yes, update Summary List   Subjective Functional Status/Changes:  []  No changes reported     No soreness after the last visit, actually felt really good. Thinks he got a little carried away since he was feeling good and exercised too much. OBJECTIVE  Modalities Rationale:     decrease inflammation and decrease pain to improve patient's ability to ambulate. min [] Estim, type/location:                                      []  att     []  unatt     []  w/US     []  w/ice    []  w/heat    min []  Mechanical Traction: type/lbs                   []  pro   []  sup   []  int   []  cont    []  before manual    []  after manual    min []  Ultrasound, settings/location:      min []  Iontophoresis w/ dexamethasone, location:                                               []  take home patch       []  in clinic   10 min [x]  Ice     []  Heat    location/position: Supine, (R) knee. min []  Vasopneumatic Device, press/temp:     min []  Other:    [x] Skin assessment post-treatment (if applicable):    [x]  intact    []  redness- no adverse reaction     []redness - adverse reaction:        30 min Therapeutic Exercise:  [x]  See flow sheet   Rationale:      increase ROM, increase strength, improve coordination and improve balance to improve the patients ability to perform pain free ADLs.      10 Min Manual Therapy: (R) knee PROM   Rationale:      decrease pain, increase ROM, increase tissue extensibility and decrease trigger points to improve patient's ability to perform pain free ADLs. min Patient Education:  YES  Reviewed HEP   []  Progressed/Changed HEP based on: Other Objective/Functional Measures: Therex per flow sheet. Post Treatment Pain Level (on 0 to 10) scale:   4  / 10     ASSESSMENT  Assessment/Changes in Function:     Slight increase in pain with therex today. Instructed pt to continue with current HEP exercises, no changes. []  See Progress Note/Recertification   Patient will continue to benefit from skilled PT services to modify and progress therapeutic interventions, address functional mobility deficits, address ROM deficits, address strength deficits, analyze and address soft tissue restrictions, analyze and cue movement patterns, analyze and modify body mechanics/ergonomics and assess and modify postural abnormalities to attain remaining goals. Progress toward goals / Updated goals:    No change in progress toward LTG's with today's session.       PLAN  [x]  Upgrade activities as tolerated YES Continue plan of care   []  Discharge due to :    []  Other:      Therapist: Jayla Benson PTA    Date: 5/3/2017 Time: 3:36 PM     Future Appointments  Date Time Provider Brice Bush   5/8/2017 3:30 PM Jayla Benson PTA [facility]   5/10/2017 3:30 PM Jayla Benson PTA [facility]   5/15/2017 3:00 PM Jayla Benson PTA [facility]   5/17/2017 3:00 PM Jayla Benson PTA [facility]   5/22/2017 3:30 PM Jayla Benson PTA [facility]   5/24/2017 3:30 PM Jayla Benson [facility]

## 2017-05-08 ENCOUNTER — HOSPITAL ENCOUNTER (OUTPATIENT)
Dept: PHYSICAL THERAPY | Age: 57
Discharge: HOME | End: 2017-05-08
Payer: COMMERCIAL

## 2017-05-08 PROCEDURE — 97140 MANUAL THERAPY 1/> REGIONS: CPT

## 2017-05-08 PROCEDURE — 97110 THERAPEUTIC EXERCISES: CPT

## 2017-05-08 NOTE — PROGRESS NOTES
PHYSICAL THERAPY - DAILY TREATMENT NOTE    Patient Name: Suys Grubsb        Date: 2017  : 1960   YES Patient  Verified  Visit #:     Insurance: Payor: Roxann Gao / Plan: Lawson Montoya / Product Type: [employer] /      In time: 335 Out time: 430   Total Treatment Time: 55     Medicare Time Tracking (below)   Total Timed Codes (min):  45 1:1 Treatment Time:       TREATMENT AREA =  Right knee pain [M25.561]    SUBJECTIVE  Pain Level (on 0 to 10 scale):  5  / 10   Medication Changes/New allergies or changes in medical history, any new surgeries or procedures? NO    If yes, update Summary List   Subjective Functional Status/Changes:  []  No changes reported     States he had a sharp pain just below his kneecap today. Did a lot of walking yesterday and felt great. About 10 am this morning, knee pain started, felt like some nerve pain and went away about 1 pm.         OBJECTIVE  Modalities Rationale:     decrease inflammation and decrease pain to improve patient's ability to ambulate. min [] Estim, type/location:                                      []  att     []  unatt     []  w/US     []  w/ice    []  w/heat    min []  Mechanical Traction: type/lbs                   []  pro   []  sup   []  int   []  cont    []  before manual    []  after manual    min []  Ultrasound, settings/location:      min []  Iontophoresis w/ dexamethasone, location:                                               []  take home patch       []  in clinic   10 min [x]  Ice     []  Heat    location/position: Supine, (R) knee.      min []  Vasopneumatic Device, press/temp:     min []  Other:    [x] Skin assessment post-treatment (if applicable):    [x]  intact    []  redness- no adverse reaction     []redness - adverse reaction:        35  min Therapeutic Exercise:  [x]  See flow sheet   Rationale:      increase ROM, increase strength, improve coordination and improve balance to improve the patients ability to ambulate. 10 Min Manual Therapy: (R) knee PROM. Patellar mobs. Rationale:      decrease pain, increase ROM, increase tissue extensibility and decrease trigger points to improve patient's ability to perform pain free ADLs. min Patient Education:  YES  Reviewed HEP   []  Progressed/Changed HEP based on: Other Objective/Functional Measures: Therex per flow sheet. Added yellow t-band to squats. Knee pain returned following modalities. Instructed pt to perform LAQ x20. Post Treatment Pain Level (on 0 to 10) scale:   4  / 10     ASSESSMENT  Assessment/Changes in Function:     Good tolerance to treatment today. Decrease in pain following seated LAQ, knee motion. []  See Progress Note/Recertification   Patient will continue to benefit from skilled PT services to modify and progress therapeutic interventions, address functional mobility deficits, address ROM deficits, address strength deficits, analyze and address soft tissue restrictions, analyze and cue movement patterns, analyze and modify body mechanics/ergonomics and assess and modify postural abnormalities to attain remaining goals. Progress toward goals / Updated goals:    No change in progress toward LTG's with today's session.       PLAN  [x]  Upgrade activities as tolerated YES Continue plan of care   []  Discharge due to :    []  Other:      Therapist: Pasha Bustillos PTA    Date: 5/8/2017 Time: 3:29 PM     Future Appointments  Date Time Provider Brice Bush   5/8/2017 3:30 PM Pasha Bustillos [facility]   5/10/2017 3:30 PM Pasha Bustillos PTA [facility]   5/15/2017 3:00 PM Pasha Bustillos PTA [facility]   5/17/2017 3:00 PM Pasha Bustillos PTA [facility]   5/22/2017 3:30 PM Pasha Bustillos PTA [facility]   5/24/2017 3:30 PM Pasha Bustillos PTA [facility]

## 2017-05-10 ENCOUNTER — HOSPITAL ENCOUNTER (OUTPATIENT)
Dept: PHYSICAL THERAPY | Age: 57
Discharge: HOME | End: 2017-05-10
Payer: COMMERCIAL

## 2017-05-10 PROCEDURE — 97110 THERAPEUTIC EXERCISES: CPT

## 2017-05-10 PROCEDURE — 97140 MANUAL THERAPY 1/> REGIONS: CPT

## 2017-05-10 NOTE — PROGRESS NOTES
PHYSICAL THERAPY - DAILY TREATMENT NOTE    Patient Name: Susy Grubbs        Date: 5/10/2017  : 1960   YES Patient  Verified  Visit #:     Insurance: Payor: Roxann Gao / Plan: Lawson Montoya / Product Type: [employer] /      In time: 330 Out time: 420   Total Treatment Time: 50     Medicare Time Tracking (below)   Total Timed Codes (min):  40 1:1 Treatment Time:       TREATMENT AREA =  Right knee pain [M25.561]    SUBJECTIVE  Pain Level (on 0 to 10 scale):  5  / 10   Medication Changes/New allergies or changes in medical history, any new surgeries or procedures? NO    If yes, update Summary List   Subjective Functional Status/Changes:  []  No changes reported     Had that knee pain following ice at last visit, but it only lasted for about an hour after he left PT. Hasn't had any of that pain since. OBJECTIVE  Modalities Rationale:     decrease inflammation and decrease pain to improve patient's ability to perform pain free ADLs. min [] Estim, type/location:                                      []  att     []  unatt     []  w/US     []  w/ice    []  w/heat    min []  Mechanical Traction: type/lbs                   []  pro   []  sup   []  int   []  cont    []  before manual    []  after manual    min []  Ultrasound, settings/location:      min []  Iontophoresis w/ dexamethasone, location:                                               []  take home patch       []  in clinic   10 min [x]  Ice     []  Heat    location/position: Supine, (R) knee. min []  Vasopneumatic Device, press/temp:     min []  Other:    [x] Skin assessment post-treatment (if applicable):    [x]  intact    []  redness- no adverse reaction     []redness - adverse reaction:        30 min Therapeutic Exercise:  [x]  See flow sheet   Rationale:      increase ROM, increase strength, improve coordination and improve balance to improve the patients ability to perform pain free ADLs.       10 min Manual Therapy: (R) knee PROM, scar massage and patellar mobs. Rationale:      decrease pain, increase ROM, increase tissue extensibility and decrease trigger points to improve patient's ability to perform pain free ADLs. min Patient Education:  YES  Reviewed HEP   []  Progressed/Changed HEP based on: Other Objective/Functional Measures: Therex per flow sheet. Post Treatment Pain Level (on 0 to 10) scale:   4.5  / 10     ASSESSMENT  Assessment/Changes in Function:     Soreness @ medial patella chief c/o pain. Doing a lot of walking, able to verbalize and demonstrate (I) with HEP. Wants to return to [facility] asap. []  See Progress Note/Recertification   Patient will continue to benefit from skilled PT services to modify and progress therapeutic interventions, address functional mobility deficits, address ROM deficits, address strength deficits, analyze and address soft tissue restrictions, analyze and cue movement patterns, analyze and modify body mechanics/ergonomics and assess and modify postural abnormalities to attain remaining goals. Progress toward goals / Updated goals:    No change in progress toward LTG's with today's session.       PLAN  [x]  Upgrade activities as tolerated YES Continue plan of care   []  Discharge due to :    []  Other:      Therapist: César Hernandez PTA    Date: 5/10/2017 Time: 3:49 PM     Future Appointments  Date Time Provider Brice Bush   5/15/2017 3:00 PM César Hernandez PTA [facility]   5/17/2017 3:00 PM César Hernandez PTA [facility]   5/22/2017 3:30 PM César Hernandez PTA [facility]   5/24/2017 3:30 PM César Hernandez PTA [facility]

## 2017-05-17 ENCOUNTER — APPOINTMENT (OUTPATIENT)
Dept: PHYSICAL THERAPY | Age: 57
End: 2017-05-17
Payer: COMMERCIAL

## 2017-05-22 ENCOUNTER — APPOINTMENT (OUTPATIENT)
Dept: PHYSICAL THERAPY | Age: 57
End: 2017-05-22
Payer: COMMERCIAL

## 2017-05-24 ENCOUNTER — APPOINTMENT (OUTPATIENT)
Dept: PHYSICAL THERAPY | Age: 57
End: 2017-05-24
Payer: COMMERCIAL

## 2017-05-31 ENCOUNTER — APPOINTMENT (OUTPATIENT)
Dept: PHYSICAL THERAPY | Age: 57
End: 2017-05-31
Payer: COMMERCIAL

## 2017-06-05 ENCOUNTER — HOSPITAL ENCOUNTER (OUTPATIENT)
Dept: PHYSICAL THERAPY | Age: 57
Discharge: HOME | End: 2017-06-05
Payer: COMMERCIAL

## 2017-06-05 PROCEDURE — 97140 MANUAL THERAPY 1/> REGIONS: CPT

## 2017-06-05 PROCEDURE — 97110 THERAPEUTIC EXERCISES: CPT

## 2017-06-05 NOTE — PROGRESS NOTES
PHYSICAL THERAPY - DAILY TREATMENT NOTE    Patient Name: Branden Alegria        Date: 2017  : 1960   YES Patient  Verified  Visit #:     Insurance: Payor: Kelli Cazares / Plan: Dilcia Mcnally / Product Type: [employer] /      In time: 230 Out time: 320   Total Treatment Time: 50     Medicare Time Tracking (below)   Total Timed Codes (min):  40 1:1 Treatment Time:       TREATMENT AREA =  Right knee pain [M25.561]    SUBJECTIVE  Pain Level (on 0 to 10 scale):  5  / 10   Medication Changes/New allergies or changes in medical history, any new surgeries or procedures? NO    If yes, update Summary List   Subjective Functional Status/Changes:  []  No changes reported     Pt reports severe pain in knee started between the  and 10th of May. States he had to go back to see the MD on the  followed by a vacation. Reports MD saw nothing wrong with the knee including taking an x-ray. States recent max pain is 10/10 and feels like his knee is very stiff. OBJECTIVE  Modalities Rationale:     decrease inflammation and decrease pain to improve patient's ability to perform pain free ADLs. min [] Estim, type/location:                                      []  att     []  unatt     []  w/US     []  w/ice    []  w/heat    min []  Mechanical Traction: type/lbs                   []  pro   []  sup   []  int   []  cont    []  before manual    []  after manual    min []  Ultrasound, settings/location:      min []  Iontophoresis w/ dexamethasone, location:                                               []  take home patch       []  in clinic   10 min [x]  Ice     []  Heat    location/position: Supine, (L) knee.      min []  Vasopneumatic Device, press/temp:     min []  Other:    [x] Skin assessment post-treatment (if applicable):    [x]  intact    []  redness- no adverse reaction     []redness - adverse reaction:        30 min Therapeutic Exercise:  [x]  See flow sheet   Rationale:      increase ROM, increase strength, improve coordination and improve balance to improve the patients ability to perform pain free ADLs. 10 Min Manual Therapy: (R) knee PROM, patellar mobs. Scar tissue massage, medial (R) knee. Rationale:      decrease pain, increase ROM, increase tissue extensibility and decrease trigger points to improve patient's ability to ambulate. min Patient Education:  YES  Reviewed HEP   []  Progressed/Changed HEP based on: Other Objective/Functional Measures: Therex per flow sheet. FOTO = 46   GROC = +4    0-93 deg AAROM knee flexion prior to manual.   0-110 deg AAROM knee flexion s/p manual scar tissue massage. Post Treatment Pain Level (on 0 to 10) scale:   4  / 10     ASSESSMENT  Assessment/Changes in Function:     See note. []  See Progress Note/Recertification   Patient will continue to benefit from skilled PT services to modify and progress therapeutic interventions, address functional mobility deficits, address ROM deficits, address strength deficits, analyze and address soft tissue restrictions, analyze and cue movement patterns, analyze and modify body mechanics/ergonomics and assess and modify postural abnormalities to attain remaining goals.    Progress toward goals / Updated goals:    See note     PLAN  [x]  Upgrade activities as tolerated YES Continue plan of care   []  Discharge due to :    []  Other:      Therapist: Micaela Wang PTA    Date: 6/5/2017 Time: 2:45 PM     Future Appointments  Date Time Provider Brice Bush   6/8/2017 2:30 PM Micaela Wang PTA [facility]   6/12/2017 2:30 PM Micaela Wang PTA [facility]   6/15/2017 2:30 PM Micaela Wang PTA [facility]   6/19/2017 2:30 PM Micaela Wang PTA [facility]   6/22/2017 2:30 PM Micaela Wang PTA [facility]

## 2017-06-06 NOTE — PROGRESS NOTES
[address] [place] [address], [facility] - Phone: [phone]  Fax: [phone]  PROGRESS NOTE  Patient Name: Branden Alegria : 1960   Treatment/Medical Diagnosis: Right knee pain [M25.561]   Referral Source: Gus Jacob MD     Date of Initial Visit: 2017 Attended Visits: 6 Missed Visits: 3     SUMMARY OF TREATMENT  PT has consisted of initial evaluation, therapeutic exercises, HEP, manual therapy, patient education, and modalities. CURRENT STATUS  Pt presented to [facility] s/p (R) TKA. Between dates of  - 5/10, pt reports a sever pain spread through his knee which prompted him to return to MD to assess current condition. Pt cleared to return to PT w/o known cause of knee pain. Pt reporting increase in stiffness, pain, decrease in overall functional ability. Current max pain reported as 10/10. Current knee AAROM = 0-110 deg following scar tissue massage (0-93 deg prior to manual). Decrease in FOTO, down 5 points from IE, GROC = -4. Pt has returned to PT for further treatment. Goal/Measure of Progress Goal Met? 1. Decrease max pain 50-75% to assist with amb   Status at last Eval: 5/10 max  Current Status: 10/10 max  no   2. Increase FOTO score to 67% to show functional improvment. Status at last Eval: 51  Current Status: 46 no   3. Will rate  >/= +5 on Global Rating of Change and be prepared to DC to HEP. Status at last Eval: na Current Status: -4  no     New Goals to be achieved in __4__  weeks:  1. Decrease max pain 50-75% to assist with amb  2. Increase FOTO score to 67% to show functional improvment. 3. Will rate  >/= +5 on Global Rating of Change and be prepared to DC to HEP. RECOMMENDATIONS  Pt has returned to PT following concerns over recent knee pain. Plan to continue 2-3x weekly for additional 4 weeks to meet all LTG's and return to PLOF.   Thank you for this referral.   If you have any questions/comments please contact us directly at [phone]. Thank you for allowing us to assist in the care of your patient. LPTA Signature: Micaela Wang PTA  Date: 6/6/2017   PT Signature: Gearline Appl, PT, OCS, SCS, CSCS Time: 9:55 AM   NOTE TO PHYSICIAN:  PLEASE COMPLETE THE ORDERS BELOW AND FAX TO   [facility]: [phone]  If you are unable to process this request in 24 hours please contact our office: [phone]    ___ I have read the above report and request that my patient continue as recommended.   ___ I have read the above report and request that my patient continue therapy with the following changes/special instructions:_________________________________________________________   ___ I have read the above report and request that my patient be discharged from therapy.      Physician Signature:        Date:       Time:

## 2017-06-08 ENCOUNTER — HOSPITAL ENCOUNTER (OUTPATIENT)
Dept: PHYSICAL THERAPY | Age: 57
Discharge: HOME | End: 2017-06-08
Payer: COMMERCIAL

## 2017-06-08 PROCEDURE — 97110 THERAPEUTIC EXERCISES: CPT

## 2017-06-08 PROCEDURE — 97140 MANUAL THERAPY 1/> REGIONS: CPT

## 2017-06-08 NOTE — PROGRESS NOTES
PHYSICAL THERAPY - DAILY TREATMENT NOTE    Patient Name: Demian Cordova        Date: 2017  : 1960   YES Patient  Verified  Visit #:     Insurance: Payor: Afshan Dunn / Plan: Nita Rizvi / Product Type: [employer] /      In time: 225 Out time: 315   Total Treatment Time: 50     Medicare Time Tracking (below)   Total Timed Codes (min):  40 1:1 Treatment Time:       TREATMENT AREA =  Right knee pain [M25.561]    SUBJECTIVE  Pain Level (on 0 to 10 scale):  5  / 10   Medication Changes/New allergies or changes in medical history, any new surgeries or procedures? NO    If yes, update Summary List   Subjective Functional Status/Changes:  []  No changes reported     Pt states he's been pressing on that sore spot on the inside of his (R) knee. Feels like he can bend it much more than he could last week. Able to bend it past the point of that sharp pain. OBJECTIVE  Modalities Rationale:     decrease inflammation and decrease pain to improve patient's ability to perform pain free ADLs. min [] Estim, type/location:                                      []  att     []  unatt     []  w/US     []  w/ice    []  w/heat    min []  Mechanical Traction: type/lbs                   []  pro   []  sup   []  int   []  cont    []  before manual    []  after manual    min []  Ultrasound, settings/location:      min []  Iontophoresis w/ dexamethasone, location:                                               []  take home patch       []  in clinic   10 min [x]  Ice     []  Heat    location/position: Supine, (R) knee.      min []  Vasopneumatic Device, press/temp:     min []  Other:    [x] Skin assessment post-treatment (if applicable):    [x]  intact    []  redness- no adverse reaction     []redness - adverse reaction:        30 min Therapeutic Exercise:  [x]  See flow sheet   Rationale:      increase ROM, increase strength, improve coordination, improve balance and increase proprioception to improve the patients ability to perform pain free ADLs. 10 Min Manual Therapy: (R) knee PROM. STM ant (R) knee. Rationale:      decrease pain, increase ROM, increase tissue extensibility and decrease trigger points to improve patient's ability to perform pain free ADLs. min Patient Education:  YES  Reviewed HEP   []  Progressed/Changed HEP based on: Other Objective/Functional Measures:    Resumed previously held therex. Post Treatment Pain Level (on 0 to 10) scale:   2  / 10     ASSESSMENT  Assessment/Changes in Function:     Continued soreness in the (R) knee, position dependent. Occurs with flexion @ ~95 deg, abolishes once past ~100 deg. ROM limited by soft tissue restrictions today, not nerve pain. []  See Progress Note/Recertification   Patient will continue to benefit from skilled PT services to modify and progress therapeutic interventions, address functional mobility deficits, address ROM deficits, address strength deficits, analyze and address soft tissue restrictions, analyze and cue movement patterns, analyze and modify body mechanics/ergonomics and assess and modify postural abnormalities to attain remaining goals. Progress toward goals / Updated goals:    No change in progress toward LTG's with today's session.       PLAN  [x]  Upgrade activities as tolerated YES Continue plan of care   []  Discharge due to :    []  Other:      Therapist: Madelyne Peabody, PTA    Date: 6/8/2017 Time: 2:27 PM     Future Appointments  Date Time Provider Brice Bush   6/8/2017 2:30 PM Madelyne Peabody, PTA [facility]   6/12/2017 2:30 PM Madelyne Peabody, PTA [facility]   6/15/2017 2:30 PM Madelyne Peabody, PTA [facility]   6/19/2017 2:30 PM Madelyne Peabody, PTA [facility]   6/22/2017 2:30 PM Madelyne Peabody, PTA [facility]

## 2017-06-12 ENCOUNTER — HOSPITAL ENCOUNTER (OUTPATIENT)
Dept: PHYSICAL THERAPY | Age: 57
Discharge: HOME | End: 2017-06-12
Payer: COMMERCIAL

## 2017-06-12 PROCEDURE — 97140 MANUAL THERAPY 1/> REGIONS: CPT

## 2017-06-12 PROCEDURE — 97110 THERAPEUTIC EXERCISES: CPT

## 2017-06-12 NOTE — PROGRESS NOTES
PHYSICAL THERAPY - DAILY TREATMENT NOTE    Patient Name: Flakito Prather        Date: 2017  : 1960   YES Patient  Verified  Visit #:     Insurance: Payor: Darcie Suresh / Plan: Abhinav Link / Product Type: [employer] /      In time: 225 Out time: 325   Total Treatment Time: 60     Medicare Time Tracking (below)   Total Timed Codes (min):  50 1:1 Treatment Time:       TREATMENT AREA =  Right knee pain [M25.561]    SUBJECTIVE  Pain Level (on 0 to 10 scale):  5  / 10   Medication Changes/New allergies or changes in medical history, any new surgeries or procedures? NO    If yes, update Summary List   Subjective Functional Status/Changes:  []  No changes reported     Very sore after the last session. Says the knee hurt a lot on Friday, got back in the groove on Saturday though. Feels like he's doing everything better, it's just this nagging knee pain that's bothering him. OBJECTIVE  Modalities Rationale:     decrease inflammation and decrease pain to improve patient's ability to perform pain free ADLs. min [] Estim, type/location:                                      []  att     []  unatt     []  w/US     []  w/ice    []  w/heat    min []  Mechanical Traction: type/lbs                   []  pro   []  sup   []  int   []  cont    []  before manual    []  after manual    min []  Ultrasound, settings/location:      min []  Iontophoresis w/ dexamethasone, location:                                               []  take home patch       []  in clinic   10 min [x]  Ice     []  Heat    location/position: Supine, (R) knee.       min []  Vasopneumatic Device, press/temp:     min []  Other:    [x] Skin assessment post-treatment (if applicable):    [x]  intact    []  redness- no adverse reaction     []redness - adverse reaction:        35 min Therapeutic Exercise:  [x]  See flow sheet   Rationale:      increase ROM, increase strength, improve coordination and improve balance to improve the patients ability to perform pain free ADLs. 15 min Manual Therapy: (R) knee PROM. Scar massage. Scar tissue mobilization medial jt line. Rationale:      decrease pain, increase ROM and increase tissue extensibility to improve patient's ability to ambulate. min Patient Education:  YES  Reviewed HEP   []  Progressed/Changed HEP based on: Other Objective/Functional Measures: Therex per flow sheet. Post Treatment Pain Level (on 0 to 10) scale:   2  / 10     ASSESSMENT  Assessment/Changes in Function:     Pt reporting some pain in the medial knee but only at end range. Inconsistent pain during AROM knee flexion in supine. Sometimes reporting fiery pain that extends to mid shin during AROM knee flexion. []  See Progress Note/Recertification   Patient will continue to benefit from skilled PT services to modify and progress therapeutic interventions, address functional mobility deficits, address ROM deficits, address strength deficits, analyze and address soft tissue restrictions, analyze and cue movement patterns, analyze and modify body mechanics/ergonomics and assess and modify postural abnormalities to attain remaining goals. Progress toward goals / Updated goals:    No change in progress toward LTG's with today's session.       PLAN  [x]  Upgrade activities as tolerated YES Continue plan of care   []  Discharge due to :    []  Other:      Therapist: Catie Lackey PTA    Date: 6/12/2017 Time: 2:54 PM     Future Appointments  Date Time Provider Brice Bush   6/15/2017 2:30 PM Catie Lackey PTA [facility]   6/19/2017 2:30 PM Catie Lackey PTA [facility]   6/22/2017 2:30 PM Catie Lackey PTA [facility]

## 2017-06-15 ENCOUNTER — HOSPITAL ENCOUNTER (OUTPATIENT)
Dept: PHYSICAL THERAPY | Age: 57
Discharge: HOME | End: 2017-06-15
Payer: COMMERCIAL

## 2017-06-15 PROCEDURE — 97140 MANUAL THERAPY 1/> REGIONS: CPT

## 2017-06-15 PROCEDURE — 97110 THERAPEUTIC EXERCISES: CPT

## 2017-06-15 NOTE — PROGRESS NOTES
PHYSICAL THERAPY - DAILY TREATMENT NOTE    Patient Name: Theo Sheth        Date: 6/15/2017  : 1960   YES Patient  Verified  Visit #:     Insurance: Payor: Ruben Blair / Plan: Sobeida Valdovinos / Product Type: [employer] /      In time: 225 Out time: 320   Total Treatment Time: 55     Medicare Time Tracking (below)   Total Timed Codes (min):  45 1:1 Treatment Time:       TREATMENT AREA =  Right knee pain [M25.561]    SUBJECTIVE  Pain Level (on 0 to 10 scale):  5  / 10   Medication Changes/New allergies or changes in medical history, any new surgeries or procedures? NO    If yes, update Summary List   Subjective Functional Status/Changes:  []  No changes reported     Pt reports continued pain on the inside of the knee, however it is easier to bend the knee. Feels a lot of \"clicking/grinding\" on the inside of the knee when bending it, is worried it may be a mal-functioning part of the plastic. OBJECTIVE  Modalities Rationale:     decrease inflammation and decrease pain to improve patient's ability to perform pain free ADLs. min [] Estim, type/location:                                      []  att     []  unatt     []  w/US     []  w/ice    []  w/heat    min []  Mechanical Traction: type/lbs                   []  pro   []  sup   []  int   []  cont    []  before manual    []  after manual    min []  Ultrasound, settings/location:      min []  Iontophoresis w/ dexamethasone, location:                                               []  take home patch       []  in clinic   10 min [x]  Ice     []  Heat    location/position: Supine, (R) knee.      min []  Vasopneumatic Device, press/temp:     min []  Other:    [x] Skin assessment post-treatment (if applicable):    [x]  intact    []  redness- no adverse reaction     []redness - adverse reaction:        30 min Therapeutic Exercise:  [x]  See flow sheet   Rationale:      increase ROM, increase strength, improve coordination and improve balance to improve the patients ability to perform pain free ADLs. 15 Min Manual Therapy: (R) knee PROM. Scar massage and patellar mobs. TPR to medial     Rationale:      decrease pain, increase ROM, increase tissue extensibility and decrease trigger points  to improve patient's ability to perform pain free ADLs. min Patient Education:  YES  Reviewed HEP   []  Progressed/Changed HEP based on: Other Objective/Functional Measures: Therex per flow sheet. Knee PROM ext = 0        Post Treatment Pain Level (on 0 to 10) scale:   2  / 10     ASSESSMENT  Assessment/Changes in Function:     No difficulty with knee extension. Reports pain, stretching, and the sharp \"jolt\" during knee flexion, however ROM is not limited compared to previous visits. []  See Progress Note/Recertification   Patient will continue to benefit from skilled PT services to modify and progress therapeutic interventions, address functional mobility deficits, address ROM deficits, address strength deficits, analyze and address soft tissue restrictions, analyze and cue movement patterns, analyze and modify body mechanics/ergonomics and assess and modify postural abnormalities to attain remaining goals. Progress toward goals / Updated goals:    No change in progress toward LTG's with today's session.        PLAN  [x]  Upgrade activities as tolerated YES Continue plan of care   []  Discharge due to :    []  Other:      Therapist: Ann Kauffman PTA    Date: 6/15/2017 Time: 3:17 PM     Future Appointments  Date Time Provider Brice Bush   6/19/2017 2:30 PM Ann Kauffman PTA [facility]   6/22/2017 2:30 PM Ann Kauffman PTA [facility]

## 2017-06-19 ENCOUNTER — HOSPITAL ENCOUNTER (OUTPATIENT)
Dept: PHYSICAL THERAPY | Age: 57
Discharge: HOME | End: 2017-06-19
Payer: COMMERCIAL

## 2017-06-19 PROCEDURE — 97110 THERAPEUTIC EXERCISES: CPT

## 2017-06-19 PROCEDURE — 97140 MANUAL THERAPY 1/> REGIONS: CPT

## 2017-06-19 NOTE — PROGRESS NOTES
PHYSICAL THERAPY - DAILY TREATMENT NOTE    Patient Name: Branden Alegria        Date: 2017  : 1960   YES Patient  Verified  Visit #:   10   of   12  Insurance: Payor: Kelli Cazares / Plan: Dilcia Mcnally / Product Type: [employer] /      In time: 225 Out time: 320   Total Treatment Time: 55     Medicare Time Tracking (below)   Total Timed Codes (min):  45 1:1 Treatment Time:       TREATMENT AREA =  Right knee pain [M25.561]    SUBJECTIVE  Pain Level (on 0 to 10 scale):  2-3  / 10   Medication Changes/New allergies or changes in medical history, any new surgeries or procedures? NO    If yes, update Summary List   Subjective Functional Status/Changes:  []  No changes reported     \"I had a breakthrough Friday. Was pedaling on the bike, got it warmed up, and ended up going for 25 minutes. It's felt much better since then. \"         OBJECTIVE  Modalities Rationale:     decrease inflammation and decrease pain to improve patient's ability to perform pain free ADLs. min [] Estim, type/location:                                      []  att     []  unatt     []  w/US     []  w/ice    []  w/heat    min []  Mechanical Traction: type/lbs                   []  pro   []  sup   []  int   []  cont    []  before manual    []  after manual    min []  Ultrasound, settings/location:      min []  Iontophoresis w/ dexamethasone, location:                                               []  take home patch       []  in clinic   10 min [x]  Ice     []  Heat    location/position: Supine, (R) knee. min []  Vasopneumatic Device, press/temp:     min []  Other:    [x] Skin assessment post-treatment (if applicable):    [x]  intact    []  redness- no adverse reaction     []redness - adverse reaction:        35 min Therapeutic Exercise:  [x]  See flow sheet   Rationale:      increase ROM, increase strength, improve coordination and improve balance to improve the patients ability to perform pain free ADLs.       10 min Manual Therapy: (R) knee PROM. Rationale:      decrease pain, increase ROM, increase tissue extensibility and decrease trigger points to improve patient's ability to ambulate. min Patient Education:  YES  Reviewed HEP   []  Progressed/Changed HEP based on: Other Objective/Functional Measures:    0-122 deg knee PROM  Added 1# resistance to SLR flex/abd/ext. Added leg press to improve LE strength and stability for ADLs. Post Treatment Pain Level (on 0 to 10) scale:   2  / 10     ASSESSMENT  Assessment/Changes in Function:     Pt demonstrating sufficient ROM. Good LE strength       []  See Progress Note/Recertification   Patient will continue to benefit from skilled PT services to modify and progress therapeutic interventions, address functional mobility deficits, address ROM deficits, address strength deficits, analyze and address soft tissue restrictions, analyze and cue movement patterns, analyze and modify body mechanics/ergonomics and assess and modify postural abnormalities to attain remaining goals. Progress toward goals / Updated goals:    Continued strengthening to progress toward LTG #2.       PLAN  [x]  Upgrade activities as tolerated YES Continue plan of care   []  Discharge due to :    []  Other:      Therapist: Margrett Gosselin, PTA    Date: 6/19/2017 Time: 2:32 PM     Future Appointments  Date Time Provider Brice Bush   6/22/2017 2:30 PM Margrett Gosselin, PTA [facility]

## 2017-06-22 ENCOUNTER — HOSPITAL ENCOUNTER (OUTPATIENT)
Dept: PHYSICAL THERAPY | Age: 57
Discharge: HOME | End: 2017-06-22
Payer: COMMERCIAL

## 2017-06-22 PROCEDURE — 97140 MANUAL THERAPY 1/> REGIONS: CPT

## 2017-06-22 PROCEDURE — 97110 THERAPEUTIC EXERCISES: CPT

## 2017-06-22 NOTE — PROGRESS NOTES
PHYSICAL THERAPY - DAILY TREATMENT NOTE    Patient Name: Carrie Espino        Date: 2017  : 1960   YES Patient  Verified  Visit #:     Insurance: Payor: Cindy Myers / Plan: Daya Skinner / Product Type: [employer] /      In time: 230 Out time: 325   Total Treatment Time: 55     Medicare Time Tracking (below)   Total Timed Codes (min):  45 1:1 Treatment Time:       TREATMENT AREA =  Right knee pain [M25.561]    SUBJECTIVE  Pain Level (on 0 to 10 scale):  2  / 10   Medication Changes/New allergies or changes in medical history, any new surgeries or procedures? NO    If yes, update Summary List   Subjective Functional Status/Changes:  []  No changes reported     Knee is feeling a lot better. Feels more comfortable doing things like exercise bike and workouts. OBJECTIVE  Modalities Rationale:     decrease inflammation and decrease pain to improve patient's ability to ambulate. min [] Estim, type/location:                                      []  att     []  unatt     []  w/US     []  w/ice    []  w/heat    min []  Mechanical Traction: type/lbs                   []  pro   []  sup   []  int   []  cont    []  before manual    []  after manual    min []  Ultrasound, settings/location:      min []  Iontophoresis w/ dexamethasone, location:                                               []  take home patch       []  in clinic   10 min [x]  Ice     []  Heat    location/position: Supine, (R) knee. min []  Vasopneumatic Device, press/temp:     min []  Other:    [x] Skin assessment post-treatment (if applicable):    [x]  intact    []  redness- no adverse reaction     []redness - adverse reaction:        30 min Therapeutic Exercise:  [x]  See flow sheet   Rationale:      increase ROM, increase strength and improve coordination to improve the patients ability to perform pain free ADLs. 15 Min Manual Therapy: (R) knee PROM. Scar tissue massage, patellar mobs.     Rationale: decrease pain, increase ROM, increase tissue extensibility and decrease trigger points to improve patient's ability to perform pain free ADLs. min Patient Education:  YES  Reviewed HEP   []  Progressed/Changed HEP based on: Other Objective/Functional Measures: Therex per flow sheet. Post Treatment Pain Level (on 0 to 10) scale:   2  / 10     ASSESSMENT  Assessment/Changes in Function:     No exacerbation of symptoms with today's session. Good tolerance to knee PROM flexion. Pt reporting less instances of that sharp pain in his knee. []  See Progress Note/Recertification   Patient will continue to benefit from skilled PT services to modify and progress therapeutic interventions, address functional mobility deficits, address ROM deficits, address strength deficits, analyze and address soft tissue restrictions, analyze and cue movement patterns, analyze and modify body mechanics/ergonomics and assess and modify postural abnormalities to attain remaining goals. Progress toward goals / Updated goals:    Gradual decrease in reported pain levels. Progressing toward LTG #1.       PLAN  [x]  Upgrade activities as tolerated YES Continue plan of care   []  Discharge due to :    []  Other:      Therapist: Nina Breen PTA    Date: 6/22/2017 Time: 3:49 PM     Future Appointments  Date Time Provider Brice Bush   6/26/2017 2:00 PM Eden Cota [facility]   6/29/2017 2:30 PM Nian Breen PTA [facility]

## 2017-06-26 ENCOUNTER — HOSPITAL ENCOUNTER (OUTPATIENT)
Dept: PHYSICAL THERAPY | Age: 57
Discharge: HOME | End: 2017-06-26
Payer: COMMERCIAL

## 2017-06-26 PROCEDURE — 97140 MANUAL THERAPY 1/> REGIONS: CPT

## 2017-06-26 PROCEDURE — 97110 THERAPEUTIC EXERCISES: CPT

## 2017-06-26 NOTE — PROGRESS NOTES
PHYSICAL THERAPY - DAILY TREATMENT NOTE    Patient Name: Isrrael Kaufman        Date: 2017  : 1960   YES Patient  Verified  Visit #:     Insurance: Payor: Osbaldo Dunlap / Plan: Jatin Flores / Product Type: [employer] /      In time: 2 Out time: 250   Total Treatment Time: 50     Medicare Time Tracking (below)   Total Timed Codes (min):  40 1:1 Treatment Time:       TREATMENT AREA =  Right knee pain [M25.561]    SUBJECTIVE  Pain Level (on 0 to 10 scale):  2-3  / 10   Medication Changes/New allergies or changes in medical history, any new surgeries or procedures? NO    If yes, update Summary List   Subjective Functional Status/Changes:  []  No changes reported     \"I was on my feet all weekend. Didn't have any of the nerve pain, but it feels sore and swollen today. \"          OBJECTIVE  Modalities Rationale:     decrease inflammation and decrease pain to improve patient's ability to ambulate. min [] Estim, type/location:                                      []  att     []  unatt     []  w/US     []  w/ice    []  w/heat    min []  Mechanical Traction: type/lbs                   []  pro   []  sup   []  int   []  cont    []  before manual    []  after manual    min []  Ultrasound, settings/location:      min []  Iontophoresis w/ dexamethasone, location:                                               []  take home patch       []  in clinic   10 min [x]  Ice     []  Heat    location/position: Supine, (R) knee. min []  Vasopneumatic Device, press/temp:     min []  Other:    [x] Skin assessment post-treatment (if applicable):    [x]  intact    []  redness- no adverse reaction     []redness - adverse reaction:        30 min Therapeutic Exercise:  [x]  See flow sheet   Rationale:      increase ROM, increase strength, improve coordination and improve balance to improve the patients ability to ambulate. 10 Min Manual Therapy: STM/DTM (R) distal HS/proximal gastroc/soleus.   Gentle knee ROM. Rationale:      decrease pain, increase ROM, increase tissue extensibility and decrease trigger points to improve patient's ability to ambulate. min Patient Education:  YES  Reviewed HEP   []  Progressed/Changed HEP based on: Other Objective/Functional Measures:    Progressed resistance with squat w/ band to table, added 90/90 HS stretch, to improve LE mobility and strength for ADLs. Post Treatment Pain Level (on 0 to 10) scale:   4  / 10     ASSESSMENT  Assessment/Changes in Function:     (R) knee ROM unaffected by soreness that pt was complaining of.      []  See Progress Note/Recertification   Patient will continue to benefit from skilled PT services to modify and progress therapeutic interventions, address functional mobility deficits, address ROM deficits, address strength deficits, analyze and address soft tissue restrictions, analyze and cue movement patterns, analyze and modify body mechanics/ergonomics and assess and modify postural abnormalities to attain remaining goals. Progress toward goals / Updated goals:    No change in progress toward LTG's with today's session.       PLAN  [x]  Upgrade activities as tolerated YES Continue plan of care   []  Discharge due to :    []  Other:      Therapist: Mariela Reyes PTA    Date: 6/26/2017 Time: 2:05 PM     Future Appointments  Date Time Provider Brice Bush   6/29/2017 2:30 PM Mariela Reyes PTA [facility]

## 2017-06-29 ENCOUNTER — HOSPITAL ENCOUNTER (OUTPATIENT)
Dept: PHYSICAL THERAPY | Age: 57
Discharge: HOME | End: 2017-06-29
Payer: COMMERCIAL

## 2017-06-29 PROCEDURE — 97110 THERAPEUTIC EXERCISES: CPT

## 2017-06-29 PROCEDURE — 97140 MANUAL THERAPY 1/> REGIONS: CPT

## 2017-06-29 NOTE — PROGRESS NOTES
[address] [place] [address] [address], [address] [place] [address] - Phone: [phone]  Fax: [phone] SUMMARY  Patient Name: Cari Monreal : 1960   Treatment/Medical Diagnosis: Right knee pain [M25.561]   Referral Source: Nat Mcfarlane MD     Date of Initial Visit:  Attended Visits: 13 Missed Visits: 3     SUMMARY OF TREATMENT  PT has consisted of initial evaluation, therapeutic exercises, HEP, manual therapy, patient education, and modalities. CURRENT STATUS  [facility]. Recent max pain reported as 3/10. Current knee PROM = 0-130 deg. GROC = +6, FOTO = 57. Pt is (I) and compliant with long-term HEP exercises. Pt reporting no longer having sharp knee pain that caused him to return to MD @ beginning of . Pt to be out of town for a \"few weeks\". DC from PT at this time. Goal/Measure of Progress Goal Met? 1. Decrease max pain 50-75% to assist with amb   Status at last Eval: 10/10 max  Current Status: 3/10 max  yes   2. Increase FOTO score to 67% to show functional improvment. Status at last Eval: 51 @ eval  Current Status: 57 current progressing   3. Will rate  >/= +5 on Global Rating of Change and be prepared to DC to HEP. Status at last Eval: Na  Current Status: +6 yes     RECOMMENDATIONS  Discontinue therapy. Progressing towards or have reached established goals. If you have any questions/comments please contact us directly at [phone]. Thank you for allowing us to assist in the care of your patient.     LPTA Signature: Freedom Cazares, PTA Date: 2017   Therapist Signature: Haydee Paulson, PT, OCS, SCS, CSCS Time: 3:38 PM

## 2017-06-29 NOTE — PROGRESS NOTES
PHYSICAL THERAPY - DAILY TREATMENT NOTE    Patient Name: Cari Monreal        Date: 2017  : 1960   YES Patient  Verified  Visit #:   15   of     Insurance: Payor: Jamison Vergara / Plan: Sanna Samaniego / Product Type: [employer] /      In time: 225 Out time: 320   Total Treatment Time: 55     Medicare Time Tracking (below)   Total Timed Codes (min):  45 1:1 Treatment Time:       TREATMENT AREA =  Right knee pain [M25.561]    SUBJECTIVE  Pain Level (on 0 to 10 scale):  3  / 10   Medication Changes/New allergies or changes in medical history, any new surgeries or procedures? NO    If yes, update Summary List   Subjective Functional Status/Changes:  []  No changes reported     A little sore from spending a lot of time on his feet for the past few days. Feels pretty good overall. Hasn't had that sharp pain in his knee for a while now. Recent max pain = 3/10. OBJECTIVE  Modalities Rationale:     decrease inflammation and decrease pain to improve patient's ability to perform pain free ADLs. min [] Estim, type/location:                                      []  att     []  unatt     []  w/US     []  w/ice    []  w/heat    min []  Mechanical Traction: type/lbs                   []  pro   []  sup   []  int   []  cont    []  before manual    []  after manual    min []  Ultrasound, settings/location:      min []  Iontophoresis w/ dexamethasone, location:                                               []  take home patch       []  in clinic   10 min [x]  Ice     []  Heat    location/position: Supine, (R) knee.      min []  Vasopneumatic Device, press/temp:     min []  Other:    [x] Skin assessment post-treatment (if applicable):    [x]  intact    []  redness- no adverse reaction     []redness - adverse reaction:        37 min Therapeutic Exercise:  [x]  See flow sheet   Rationale:      increase ROM, increase strength, improve coordination and improve balance to improve the patients ability to ambulate. 8 Min Manual Therapy: (R) knee PROM. Rationale:      decrease pain, increase ROM, increase tissue extensibility and decrease trigger points to improve patient's ability to ambulate. min Patient Education:  YES  Reviewed HEP   []  Progressed/Changed HEP based on: Other Objective/Functional Measures:    (R) knee PROM = 0-130 deg   GROC = +6   FOTO = 57     Post Treatment Pain Level (on 0 to 10) scale:   0  / 10     ASSESSMENT  Assessment/Changes in Function:     See DC      []  See Progress Note/Recertification   Patient will continue to benefit from skilled PT services to    Progress toward goals / Updated goals:    See DC      PLAN  []  Upgrade activities as tolerated No Continue plan of care   [x]  Discharge due to : Program complete. []  Other:      Therapist: Charli Guzman PTA    Date: 6/29/2017 Time: 2:48 PM     No future appointments.

## 2017-07-03 ENCOUNTER — APPOINTMENT (OUTPATIENT)
Dept: PHYSICAL THERAPY | Age: 57
End: 2017-07-03

## 2017-07-06 ENCOUNTER — APPOINTMENT (OUTPATIENT)
Dept: PHYSICAL THERAPY | Age: 57
End: 2017-07-06

## 2017-07-10 ENCOUNTER — APPOINTMENT (OUTPATIENT)
Dept: PHYSICAL THERAPY | Age: 57
End: 2017-07-10

## 2017-07-13 ENCOUNTER — APPOINTMENT (OUTPATIENT)
Dept: PHYSICAL THERAPY | Age: 57
End: 2017-07-13

## 2017-07-17 ENCOUNTER — APPOINTMENT (OUTPATIENT)
Dept: PHYSICAL THERAPY | Age: 57
End: 2017-07-17

## 2017-07-20 ENCOUNTER — APPOINTMENT (OUTPATIENT)
Dept: PHYSICAL THERAPY | Age: 57
End: 2017-07-20

## 2019-11-07 ENCOUNTER — OFFICE VISIT (OUTPATIENT)
Dept: NEUROLOGY | Age: 59
End: 2019-11-07

## 2019-11-07 VITALS
HEART RATE: 74 BPM | HEIGHT: 70 IN | DIASTOLIC BLOOD PRESSURE: 80 MMHG | SYSTOLIC BLOOD PRESSURE: 120 MMHG | OXYGEN SATURATION: 97 % | BODY MASS INDEX: 37.37 KG/M2 | RESPIRATION RATE: 18 BRPM | WEIGHT: 261 LBS | TEMPERATURE: 98.3 F

## 2019-11-07 DIAGNOSIS — E66.01 MORBID OBESITY (HCC): ICD-10-CM

## 2019-11-07 DIAGNOSIS — E66.01 SEVERE OBESITY (HCC): ICD-10-CM

## 2019-11-07 DIAGNOSIS — G47.33 OSA (OBSTRUCTIVE SLEEP APNEA): Primary | ICD-10-CM

## 2019-11-07 NOTE — PROGRESS NOTES
HPI: 63 y/o male referred by Simba Rodríguez MD for evaluation of GISSELLE. Symptoms of snoring, EDS (extreme), obesity led to PSG about 10 yrs ago. He was started on CPAP, he used it with a nasal mask for about a month, felt like he was getting strangled and quit. He remained symptomatic, has gained more weight since the last sleep study. He is having probs losing weight and it was proposed that GISSELLE may have something to do with this. Over the last 1.5 wks he found his machine, tried the same nasal mask, and he is doing better than he did 10 yrs ago, so far getting better quality of sleep. He reports he wakes up very tired, he is sleepy during the day, has sometimes probs when driving and staying awake. Denies sinusitis. He does not smoke, no drugs, has an occasional beer here and there, may have a six pack over a weekend. He has no hypertension, has hyperlipidemia being managing with diet. Social History     Socioeconomic History    Marital status: UNKNOWN     Spouse name: Not on file    Number of children: Not on file    Years of education: Not on file    Highest education level: Not on file   Occupational History    Not on file   Social Needs    Financial resource strain: Not on file    Food insecurity:     Worry: Not on file     Inability: Not on file    Transportation needs:     Medical: Not on file     Non-medical: Not on file   Tobacco Use    Smoking status: Never Smoker    Smokeless tobacco: Never Used   Substance and Sexual Activity    Alcohol use:  Yes     Alcohol/week: 1.7 standard drinks     Types: 2 Cans of beer per week    Drug use: No    Sexual activity: Yes   Lifestyle    Physical activity:     Days per week: Not on file     Minutes per session: Not on file    Stress: Not on file   Relationships    Social connections:     Talks on phone: Not on file     Gets together: Not on file     Attends Yarsanism service: Not on file     Active member of club or organization: Not on file Attends meetings of clubs or organizations: Not on file     Relationship status: Not on file    Intimate partner violence:     Fear of current or ex partner: Not on file     Emotionally abused: Not on file     Physically abused: Not on file     Forced sexual activity: Not on file   Other Topics Concern    Not on file   Social History Narrative    Not on file       History reviewed. No pertinent family history. Current Outpatient Medications   Medication Sig Dispense Refill    DICLOFENAC SODIUM PO Take  by mouth.  docusate sodium (STOOL SOFTENER) 100 mg capsule Take 100 mg by mouth four (4) times daily. Indications: Constipation      oxyCODONE-acetaminophen (PERCOCET) 5-325 mg per tablet Take 1-2 tablets by mouth every 4-6 hours as needed for pain. 61 Tab 0       Past Medical History:   Diagnosis Date    Arthritis     mild- osteo    Asthma     exercise induced    Chronic pain     knees    Liver disease 1998    Hepatitis C-clear now    Osteoarthritis of right knee 12/14/2014    Pain due to knee joint prosthesis ([facility] [address] 4/3/2017    Unspecified sleep apnea     has CPAP machine but hasn't used in 5 years       Past Surgical History:   Procedure Laterality Date    HX APPENDECTOMY  1992    HX GI  4/2012    blocked small intestine-resolved on its own    HX HEENT      T & A    HX KNEE ARTHROSCOPY Left     HX KNEE REPLACEMENT Right 02/2014    HX ORTHOPAEDIC  6-    right knee scope x2    HX ROTATOR CUFF REPAIR Right        No Known Allergies    Patient Active Problem List   Diagnosis Code    Rotator cuff syndrome of right shoulder M75. 101    Osteoarthritis of right knee M17.11    Tear of medial meniscus of left knee S83.242A    Pain due to knee joint prosthesis (HCC) T84.84XA, Z96.659    Pain due to unicompartmental arthroplasty of knee (HCC) T84.84XA, Z96.659    Severe obesity ([address] E66.01         Review of Systems:   Constitutional: no fever or chills, + fatigue  Skin denies rash or itching  HEENT:  Denies tinnitus, hearing loss, or visual changes  Respiratory: denies shortness of breath  Cardiovascular: denies chest pain, dyspnea on exertion  Gastrointestinal: does not report nausea or vomiting  Genitourinary: does not report dysuria or incontinence  Musculoskeletal: does not report joint pain or swelling  Endocrine: denies weight change  Hematology: denies easy bruising or bleeding   Neurological: as above in HPI      PHYSICAL EXAMINATION:         Vital signs:    Visit Vitals  /80 (BP 1 Location: Right arm, BP Patient Position: Sitting)   Pulse 74   Temp 98.3 °F (36.8 °C)   Resp 18   Ht 5' 9.5\" (1.765 m)   Wt 118.4 kg (261 lb)   SpO2 97%   BMI 37.99 kg/m²         GENERAL:                  Well developed, obese, in no apparent distress. HEART:                       RR, no murmurs  EXTREMITIES:           No edema is identified. Pulses are +2. HEAD:                         Normocephalic, atraumatic. Mallampati IV     NEUROLOGIC EXAMINATION       MENTAL STATUS:     Awake, alert, and oriented x 4. Attention and STM are grossly normal. There is no aphasia. Fund of knowledge is adequate. Mood and affect are appropriate  CRANIAL NERVES:   Visual fields are full to confrontation. Pupils are reactive to light and accommodation. Fundi are normal.   Extraocular movements are intact and there is no nystagmus. Facial sensation is normal  Face is symmetrical.   Hearing is present. SCM/TPZ 5/5  Tongue protrudes midline, palate elevates symmetrically. MOTOR:          5/5 strength throughout, normal tone. CEREBELLAR:           No tremors or dysmetria     SENSORY:     Normal distal pinprick, proprioception, and vibration. Romberg is neg.                  DTR's:                         +2 throughout, no long tract signs                 GAIT:                           Normal gait    Impression: GISSELLE, untreated, associated with EDS, snoring, obesity, with past history of CPAP intolerance. Plan: 1-Attended PSG   2-Counseled pt at length about obstructive sleep apnea evaluation, treatment options, weight loss as appropriate, and consequences of untreated GISSELLE. 3-Counseled pt about sleep hygiene, including predictable bedtimes and rise times, avoidance of late meals near bedtime, no TV in bedroom, to get out of room if unable to fall asleep after 10-15 mins, and no napping. 4-F/U after PSG      PLEASE NOTE:   Portions of this document may have been produced using voice recognition software. Unrecognized errors in transcription may be present. This note will not be viewable in [address]

## 2019-11-07 NOTE — PROGRESS NOTES
Chief Complaint   Patient presents with    Sleep Problem     hx of Sleep Apnea       Prema Lemon presents today for   Chief Complaint   Patient presents with    Sleep Problem     hx of Sleep Apnea       Is someone accompanying this pt? no    Is the patient using any DME equipment during OV? no    Depression Screening:  3 most recent PHQ Screens 11/7/2019   Little interest or pleasure in doing things Not at all   Feeling down, depressed, irritable, or hopeless Not at all   Total Score PHQ 2 0       Learning Assessment:  No flowsheet data found. Abuse Screening:  No flowsheet data found. Fall Risk  No flowsheet data found. Coordination of Care:  1. Have you been to the ER, urgent care clinic since your last visit? Hospitalized since your last visit? Yes, [facility] ER, Bruised Rib    2. Have you seen or consulted any other health care providers outside of the [address] [place] [address] since your last visit? Include any pap smears or colon screening.  Yes,

## 2019-11-07 NOTE — LETTER
11/7/19 Patient: Rupert Leger YOB: 1960 Date of Visit: 11/7/2019 Elina Randle MD 
2017 [address] [facility] [address] [address] VIA Facsimile: [phone] Dear Elina Randle MD, Thank you for referring Mr. Rupert Leger to i  for evaluation. My notes for this consultation are attached. If you have questions, please do not hesitate to call me. I look forward to following your patient along with you.  
 
 
Sincerely, 
 
Dennie Makos, MD

## 2019-11-07 NOTE — PATIENT INSTRUCTIONS
Thank you for choosing [facility] and [facility] for your     care. You may receive a survey about your visit. We appreciate you taking time     to complete this survey as we use your feedback to improve our services. We     realize we are not perfect, but we strive to provide excellent care.

## 2020-01-09 ENCOUNTER — HOSPITAL ENCOUNTER (OUTPATIENT)
Dept: SLEEP MEDICINE | Age: 60
Discharge: HOME | End: 2020-01-09
Payer: COMMERCIAL

## 2020-01-09 DIAGNOSIS — G47.33 OSA (OBSTRUCTIVE SLEEP APNEA): ICD-10-CM

## 2020-01-09 DIAGNOSIS — E66.01 SEVERE OBESITY (HCC): ICD-10-CM

## 2020-01-09 PROCEDURE — 95810 POLYSOM 6/> YRS 4/> PARAM: CPT

## 2020-01-10 VITALS
HEIGHT: 69 IN | SYSTOLIC BLOOD PRESSURE: 127 MMHG | DIASTOLIC BLOOD PRESSURE: 88 MMHG | HEART RATE: 57 BPM | BODY MASS INDEX: 40.76 KG/M2 | WEIGHT: 275.2 LBS

## 2020-01-18 ENCOUNTER — HOSPITAL ENCOUNTER (OUTPATIENT)
Dept: MRI IMAGING | Age: 60
Discharge: HOME | End: 2020-01-18
Attending: ORTHOPAEDIC SURGERY
Payer: COMMERCIAL

## 2020-01-18 ENCOUNTER — HOSPITAL ENCOUNTER (OUTPATIENT)
Dept: GENERAL RADIOLOGY | Age: 60
Discharge: HOME | End: 2020-01-18
Attending: ORTHOPAEDIC SURGERY
Payer: COMMERCIAL

## 2020-01-18 DIAGNOSIS — M25.562 LEFT KNEE PAIN: ICD-10-CM

## 2020-01-18 DIAGNOSIS — M17.12 DEGENERATIVE ARTHRITIS OF LEFT KNEE: ICD-10-CM

## 2020-01-18 PROCEDURE — 73501 X-RAY EXAM HIP UNI 1 VIEW: CPT

## 2020-01-18 PROCEDURE — 73600 X-RAY EXAM OF ANKLE: CPT

## 2020-01-18 PROCEDURE — 73560 X-RAY EXAM OF KNEE 1 OR 2: CPT

## 2020-01-18 PROCEDURE — 73721 MRI JNT OF LWR EXTRE W/O DYE: CPT

## 2020-01-28 ENCOUNTER — HOSPITAL ENCOUNTER (OUTPATIENT)
Dept: PREADMISSION TESTING | Age: 60
Discharge: HOME | End: 2020-01-28
Payer: COMMERCIAL

## 2020-01-28 VITALS — BODY MASS INDEX: 39.22 KG/M2 | WEIGHT: 274 LBS | HEIGHT: 70 IN

## 2020-01-28 LAB
ALBUMIN SERPL-MCNC: 3.9 G/DL (ref 3.4–5)
ALBUMIN/GLOB SERPL: 1.1 {RATIO} (ref 0.8–1.7)
ALP SERPL-CCNC: 60 U/L (ref 45–117)
ALT SERPL-CCNC: 40 U/L (ref 16–61)
ANION GAP SERPL CALC-SCNC: 4 MMOL/L (ref 3–18)
APPEARANCE UR: CLEAR
AST SERPL-CCNC: 32 U/L (ref 10–38)
BILIRUB SERPL-MCNC: 0.4 MG/DL (ref 0.2–1)
BILIRUB UR QL: NEGATIVE
BUN SERPL-MCNC: 23 MG/DL (ref 7–18)
BUN/CREAT SERPL: 22 (ref 12–20)
CALCIUM SERPL-MCNC: 9 MG/DL (ref 8.5–10.1)
CHLORIDE SERPL-SCNC: 105 MMOL/L (ref 100–111)
CO2 SERPL-SCNC: 29 MMOL/L (ref 21–32)
COLOR UR: YELLOW
CREAT SERPL-MCNC: 1.04 MG/DL (ref 0.6–1.3)
ERYTHROCYTE [DISTWIDTH] IN BLOOD BY AUTOMATED COUNT: 13.3 % (ref 11.6–14.5)
GLOBULIN SER CALC-MCNC: 3.6 G/DL (ref 2–4)
GLUCOSE SERPL-MCNC: 63 MG/DL (ref 74–99)
GLUCOSE UR STRIP.AUTO-MCNC: NEGATIVE MG/DL
HCT VFR BLD AUTO: 44.3 % (ref 36–48)
HGB BLD-MCNC: 14.5 G/DL (ref 13–16)
HGB UR QL STRIP: NEGATIVE
KETONES UR QL STRIP.AUTO: NEGATIVE MG/DL
LEUKOCYTE ESTERASE UR QL STRIP.AUTO: NEGATIVE
MCH RBC QN AUTO: 29.1 PG (ref 24–34)
MCHC RBC AUTO-ENTMCNC: 32.7 G/DL (ref 31–37)
MCV RBC AUTO: 88.8 FL (ref 74–97)
NITRITE UR QL STRIP.AUTO: NEGATIVE
PH UR STRIP: 5.5 [PH] (ref 5–8)
PLATELET # BLD AUTO: 239 K/UL (ref 135–420)
PMV BLD AUTO: 10.2 FL (ref 9.2–11.8)
POTASSIUM SERPL-SCNC: 4.4 MMOL/L (ref 3.5–5.5)
PROT SERPL-MCNC: 7.5 G/DL (ref 6.4–8.2)
PROT UR STRIP-MCNC: NEGATIVE MG/DL
RBC # BLD AUTO: 4.99 M/UL (ref 4.7–5.5)
SODIUM SERPL-SCNC: 138 MMOL/L (ref 136–145)
SP GR UR REFRACTOMETRY: 1.02 (ref 1–1.03)
UROBILINOGEN UR QL STRIP.AUTO: 0.2 EU/DL (ref 0.2–1)
WBC # BLD AUTO: 7.7 K/UL (ref 4.6–13.2)

## 2020-01-28 PROCEDURE — 87086 URINE CULTURE/COLONY COUNT: CPT

## 2020-01-28 PROCEDURE — 93005 ELECTROCARDIOGRAM TRACING: CPT

## 2020-01-28 PROCEDURE — 81003 URINALYSIS AUTO W/O SCOPE: CPT

## 2020-01-28 PROCEDURE — 85027 COMPLETE CBC AUTOMATED: CPT

## 2020-01-28 PROCEDURE — 80053 COMPREHEN METABOLIC PANEL: CPT

## 2020-01-28 RX ORDER — SODIUM CHLORIDE, SODIUM LACTATE, POTASSIUM CHLORIDE, CALCIUM CHLORIDE 600; 310; 30; 20 MG/100ML; MG/100ML; MG/100ML; MG/100ML
125 INJECTION, SOLUTION INTRAVENOUS CONTINUOUS
Status: CANCELLED | OUTPATIENT
Start: 2020-01-28

## 2020-01-28 RX ORDER — NAPROXEN SODIUM 220 MG
220 TABLET ORAL
COMMUNITY
End: 2020-02-19

## 2020-01-29 LAB
ATRIAL RATE: 60 BPM
BACTERIA SPEC CULT: NORMAL
BACTERIA SPEC CULT: NORMAL
CALCULATED P AXIS, ECG09: 28 DEGREES
CALCULATED R AXIS, ECG10: 14 DEGREES
CALCULATED T AXIS, ECG11: 26 DEGREES
DIAGNOSIS, 93000: NORMAL
P-R INTERVAL, ECG05: 154 MS
Q-T INTERVAL, ECG07: 422 MS
QRS DURATION, ECG06: 96 MS
QTC CALCULATION (BEZET), ECG08: 422 MS
SERVICE CMNT-IMP: NORMAL
VENTRICULAR RATE, ECG03: 60 BPM

## 2020-01-30 LAB
BACTERIA SPEC CULT: NORMAL
SERVICE CMNT-IMP: NORMAL

## 2020-02-17 PROBLEM — M17.12 PRIMARY OSTEOARTHRITIS OF LEFT KNEE: Chronic | Status: ACTIVE | Noted: 2020-02-17

## 2020-02-17 PROBLEM — B19.20 HEPATITIS C: Chronic | Status: ACTIVE | Noted: 2020-02-17

## 2020-02-17 PROBLEM — G47.30 SLEEP APNEA: Chronic | Status: ACTIVE | Noted: 2020-02-17

## 2020-02-17 NOTE — H&P
History and Physical        Patient: Marychuy Oleary               Sex: male          DOA: (Not on file)         YOB: 1960      Age:  64 y.o.        LOS:  LOS: 0 days        HPI:     Rosalie Hylton is a 61-year-old right-handed white male referred here for left severe medial tibiofemoral/mild patellofemoral DJD and is status post a right medial UKA with revision by Dr. Andrzej Louie in 2014. The patient is here today. He has been having some intermittent pain in the right knee. He had a medial UKA done by Dr. Andrzej Louie in 2014, and in 2016 they did a revision. It was apparently loose, so he re-cemented it. He is here today for mainly his left knee, which is giving him worsening pain. He still gets pain in the right knee that happens intermittently. It is worse with going up and down steps, and the pain seems to be more anterior than medial.    Standing AP, tunnel, lateral, and sunrise views of the left knee were obtained and interpreted in the office and show severe medial tibiofemoral degenerative joint disease. Past Medical History:   Diagnosis Date    Arthritis     Asthma     exercise induced    Chronic pain     bilateral pain    Liver disease 1998    Hepatitis C-clear now    Osteoarthritis of right knee 12/14/2014    Pain due to knee joint prosthesis ([address] 4/3/2017    Small bowel obstruction ([facility] [address] 2012    resolved    Unspecified sleep apnea     No CPAP use       Past Surgical History:   Procedure Laterality Date    HX ADENOIDECTOMY      HX APPENDECTOMY  1992    HX KNEE ARTHROSCOPY Right 6-    x 2    HX KNEE ARTHROSCOPY Left     HX KNEE REPLACEMENT Right 02/2014    Revision    HX ROTATOR CUFF REPAIR Right     HX TONSILLECTOMY         No family history on file.     Social History     Socioeconomic History    Marital status:      Spouse name: Not on file    Number of children: Not on file    Years of education: Not on file    Highest education level: Not on file   Tobacco Use  Smoking status: Never Smoker    Smokeless tobacco: Never Used   Substance and Sexual Activity    Alcohol use: Yes     Alcohol/week: 1.7 standard drinks     Types: 2 Cans of beer per week    Drug use: No    Sexual activity: Yes       Prior to Admission medications    Medication Sig Start Date End Date Taking? Authorizing Provider   naproxen sodium (NAPROSYN) 220 mg tablet Take 220 mg by mouth two (2) times daily as needed. Provider, Historical   DICLOFENAC SODIUM PO Take  by mouth daily as needed. Provider, Historical       No Known Allergies    Review of Systems    Pertinent positives include frequent urination, joint pain, joint stiffness and rash/itching. Pertinent negatives include blurred vision, chest pain, chills, cold, discharge of the eyes, dizziness, double vision, fever, headache, hearing loss, heart murmur, itching of the eyes, palpitations, redness of the eyes, rheumatic fever, ringing in ears, sore throat/hoarseness, weight change, abdominal pain, anxiety, bipolar disorder, bladder/kidney infection, bloody stool, blood in urine, burning sensation, changes in mood, chronic cough, depression, diarrhea, difficulty breathing, difficulty swallowing, fainting, fracture/dislocation, gas/bloating, gout, hemorrhoids, incontinence, loss of balance, memory loss, muscle weakness, nausea/vomiting, numbness/tingling, pain on breathing, painful urination, psoriasis, Raynaud's phenomenon, rheumatoid disease, seizure disorder, shortness of breath, sprain/strain, swelling of feet, tendonitis, varicose veins and wheezing. Physical Exam:      There were no vitals taken for this visit. Physical Exam:  Physical exam shows a healthy-appearing 82-year-old heavyset white male. The left knee has almost full extension. He is only missing a couple of degrees to flexion of 125 degrees. He has pain throughout the medial joint line, with mild patellofemoral crepitation and mild patellar inhibition.  Otherwise, examination of the left knee demonstrates a negative Lachman and has no varus or valgus instability at zero degrees or 30 degrees. There is a negative posterior sag. There is no knee effusion. There is no swelling, ecchymosis, or wounds. The patient has no lateral joint line pain and no popliteal pain. The patient has a negative patellar grind and a negative patellar apprehension test.  There is a negative Rey Maneuver. There is no pain at the inferior pole of the patella or the tibial tubercle. The patient has 5/5 muscle strength with good quadriceps tone. The patient has good capillary refill with normal motor strength of the foot and ankle and normal light-touch sensation in the foot and lower leg. Assessment/Plan     Principal Problem:    Primary osteoarthritis of left knee (2/17/2020)    Active Problems:    Hepatitis C (2/17/2020)      Sleep apnea (2/17/2020)    We will schedule him for a left outpatient Journey II XR TKA. Dr. Tabitha Sanderson talked to him about the risks, alternatives, and benefits, including infection, pain, bleeding, and DVT, and he is willing to proceed. Dr. Tabitha Sanderson issued 30 Roxicodone and five Keflex pills. Dr. Tabitha Sanderson wants him to  baby aspirin that he can use for a postoperative DVT prophylaxis. We will arrange Dr. Tabitha Sanderson outpatient physical therapy protocol. We have gotten the MRI ahead of time for the [employer] blocks, and Dr. Tabitha Sanderson will see him two weeks post surgery. He understands all the risks and wants to proceed. We have discussed the postoperative rehab as well. Once we get the left knee better enough we will discuss intervention for the right.

## 2020-02-18 RX ORDER — SODIUM CHLORIDE 0.9 % (FLUSH) 0.9 %
5-40 SYRINGE (ML) INJECTION EVERY 8 HOURS
Status: CANCELLED | OUTPATIENT
Start: 2020-02-18

## 2020-02-18 RX ORDER — SODIUM CHLORIDE 0.9 % (FLUSH) 0.9 %
5-40 SYRINGE (ML) INJECTION AS NEEDED
Status: CANCELLED | OUTPATIENT
Start: 2020-02-18

## 2020-02-19 ENCOUNTER — HOME HEALTH ADMISSION (OUTPATIENT)
Dept: HOME HEALTH SERVICES | Facility: HOME HEALTH | Age: 60
End: 2020-02-19
Payer: COMMERCIAL

## 2020-02-19 ENCOUNTER — HOSPITAL ENCOUNTER (OUTPATIENT)
Age: 60
Discharge: HOME HEALTH | End: 2020-02-19
Attending: ORTHOPAEDIC SURGERY | Admitting: ORTHOPAEDIC SURGERY
Payer: COMMERCIAL

## 2020-02-19 ENCOUNTER — ANESTHESIA (OUTPATIENT)
Dept: SURGERY | Age: 60
End: 2020-02-19
Payer: COMMERCIAL

## 2020-02-19 ENCOUNTER — ANESTHESIA EVENT (OUTPATIENT)
Dept: SURGERY | Age: 60
End: 2020-02-19
Payer: COMMERCIAL

## 2020-02-19 VITALS
SYSTOLIC BLOOD PRESSURE: 128 MMHG | RESPIRATION RATE: 13 BRPM | HEIGHT: 70 IN | OXYGEN SATURATION: 97 % | WEIGHT: 267.13 LBS | DIASTOLIC BLOOD PRESSURE: 72 MMHG | BODY MASS INDEX: 38.24 KG/M2 | HEART RATE: 98 BPM | TEMPERATURE: 97.8 F

## 2020-02-19 PROBLEM — M17.12 LEFT KNEE DJD: Status: ACTIVE | Noted: 2020-02-19

## 2020-02-19 LAB
ABO + RH BLD: NORMAL
BLOOD GROUP ANTIBODIES SERPL: NORMAL
SPECIMEN EXP DATE BLD: NORMAL

## 2020-02-19 PROCEDURE — 77030020782 HC GWN BAIR PAWS FLX 3M -B: Performed by: ORTHOPAEDIC SURGERY

## 2020-02-19 PROCEDURE — 74011250636 HC RX REV CODE- 250/636: Performed by: NURSE ANESTHETIST, CERTIFIED REGISTERED

## 2020-02-19 PROCEDURE — 74011000258 HC RX REV CODE- 258: Performed by: ORTHOPAEDIC SURGERY

## 2020-02-19 PROCEDURE — 77030003666 HC NDL SPINAL BD -A: Performed by: ORTHOPAEDIC SURGERY

## 2020-02-19 PROCEDURE — 77030002934 HC SUT MCRYL J&J -B: Performed by: ORTHOPAEDIC SURGERY

## 2020-02-19 PROCEDURE — 77030031139 HC SUT VCRL2 J&J -A: Performed by: ORTHOPAEDIC SURGERY

## 2020-02-19 PROCEDURE — 74011250637 HC RX REV CODE- 250/637: Performed by: PHYSICIAN ASSISTANT

## 2020-02-19 PROCEDURE — 77030036563 HC WRP CLD THER KNE S2SG -B: Performed by: ORTHOPAEDIC SURGERY

## 2020-02-19 PROCEDURE — 77030027138 HC INCENT SPIROMETER -A: Performed by: ORTHOPAEDIC SURGERY

## 2020-02-19 PROCEDURE — 86900 BLOOD TYPING SEROLOGIC ABO: CPT

## 2020-02-19 PROCEDURE — 77030013079 HC BLNKT BAIR HGGR 3M -A: Performed by: NURSE ANESTHETIST, CERTIFIED REGISTERED

## 2020-02-19 PROCEDURE — 76010000153 HC OR TIME 1.5 TO 2 HR: Performed by: ORTHOPAEDIC SURGERY

## 2020-02-19 PROCEDURE — L1830 KO IMMOB CANVAS LONG PRE OTS: HCPCS | Performed by: ORTHOPAEDIC SURGERY

## 2020-02-19 PROCEDURE — 74011000250 HC RX REV CODE- 250: Performed by: NURSE ANESTHETIST, CERTIFIED REGISTERED

## 2020-02-19 PROCEDURE — 77030030129 HC BLK CUST CUT VIS S&N -F: Performed by: ORTHOPAEDIC SURGERY

## 2020-02-19 PROCEDURE — 74011250636 HC RX REV CODE- 250/636: Performed by: ORTHOPAEDIC SURGERY

## 2020-02-19 PROCEDURE — 36415 COLL VENOUS BLD VENIPUNCTURE: CPT

## 2020-02-19 PROCEDURE — 76210000026 HC REC RM PH II 1 TO 1.5 HR: Performed by: ORTHOPAEDIC SURGERY

## 2020-02-19 PROCEDURE — 77030034479 HC ADH SKN CLSR PRINEO J&J -B: Performed by: ORTHOPAEDIC SURGERY

## 2020-02-19 PROCEDURE — 74011250636 HC RX REV CODE- 250/636: Performed by: ANESTHESIOLOGY

## 2020-02-19 PROCEDURE — 77030011628: Performed by: ORTHOPAEDIC SURGERY

## 2020-02-19 PROCEDURE — 97116 GAIT TRAINING THERAPY: CPT

## 2020-02-19 PROCEDURE — 64450 NJX AA&/STRD OTHER PN/BRANCH: CPT | Performed by: ANESTHESIOLOGY

## 2020-02-19 PROCEDURE — 97161 PT EVAL LOW COMPLEX 20 MIN: CPT

## 2020-02-19 PROCEDURE — C1713 ANCHOR/SCREW BN/BN,TIS/BN: HCPCS | Performed by: ORTHOPAEDIC SURGERY

## 2020-02-19 PROCEDURE — 77030033263 HC DRSG MEPILEX 16-48IN BORD MOLN -B: Performed by: ORTHOPAEDIC SURGERY

## 2020-02-19 PROCEDURE — 74011250636 HC RX REV CODE- 250/636: Performed by: PHYSICIAN ASSISTANT

## 2020-02-19 PROCEDURE — 77030034694 HC SCPL CANADY PLSM DISP USMD -E: Performed by: ORTHOPAEDIC SURGERY

## 2020-02-19 PROCEDURE — 77030012508 HC MSK AIRWY LMA AMBU -A: Performed by: NURSE ANESTHETIST, CERTIFIED REGISTERED

## 2020-02-19 PROCEDURE — 76942 ECHO GUIDE FOR BIOPSY: CPT | Performed by: ORTHOPAEDIC SURGERY

## 2020-02-19 PROCEDURE — 77030038010: Performed by: ORTHOPAEDIC SURGERY

## 2020-02-19 PROCEDURE — 77030013708 HC HNDPC SUC IRR PULS STRY –B: Performed by: ORTHOPAEDIC SURGERY

## 2020-02-19 PROCEDURE — 76210000006 HC OR PH I REC 0.5 TO 1 HR: Performed by: ORTHOPAEDIC SURGERY

## 2020-02-19 PROCEDURE — C1776 JOINT DEVICE (IMPLANTABLE): HCPCS | Performed by: ORTHOPAEDIC SURGERY

## 2020-02-19 PROCEDURE — 74011000258 HC RX REV CODE- 258: Performed by: NURSE ANESTHETIST, CERTIFIED REGISTERED

## 2020-02-19 PROCEDURE — 77030018836 HC SOL IRR NACL ICUM -A: Performed by: ORTHOPAEDIC SURGERY

## 2020-02-19 PROCEDURE — 76060000034 HC ANESTHESIA 1.5 TO 2 HR: Performed by: ORTHOPAEDIC SURGERY

## 2020-02-19 PROCEDURE — 77030016060 HC NDL NRV BLK TELE -A: Performed by: ANESTHESIOLOGY

## 2020-02-19 PROCEDURE — 74011250637 HC RX REV CODE- 250/637: Performed by: ANESTHESIOLOGY

## 2020-02-19 PROCEDURE — 77030040361 HC SLV COMPR DVT MDII -B: Performed by: ORTHOPAEDIC SURGERY

## 2020-02-19 PROCEDURE — 74011000250 HC RX REV CODE- 250: Performed by: ORTHOPAEDIC SURGERY

## 2020-02-19 DEVICE — CEMENT BNE 20GM HALF DOSE PMMA VISC RADPQ FAST: Type: IMPLANTABLE DEVICE | Site: KNEE | Status: FUNCTIONAL

## 2020-02-19 DEVICE — COMPONENT FEM PAT PFJ L W OXINIUM AND RND: Type: IMPLANTABLE DEVICE | Site: KNEE | Status: FUNCTIONAL

## 2020-02-19 DEVICE — GENESIS II OVAL RESURFACING                                    PATELLAR 38MM
Type: IMPLANTABLE DEVICE | Site: KNEE | Status: FUNCTIONAL
Brand: GENESIS II

## 2020-02-19 RX ORDER — SODIUM CHLORIDE, SODIUM LACTATE, POTASSIUM CHLORIDE, CALCIUM CHLORIDE 600; 310; 30; 20 MG/100ML; MG/100ML; MG/100ML; MG/100ML
125 INJECTION, SOLUTION INTRAVENOUS CONTINUOUS
Status: DISCONTINUED | OUTPATIENT
Start: 2020-02-19 | End: 2020-02-19 | Stop reason: HOSPADM

## 2020-02-19 RX ORDER — NALOXONE HYDROCHLORIDE 0.4 MG/ML
0.1 INJECTION, SOLUTION INTRAMUSCULAR; INTRAVENOUS; SUBCUTANEOUS AS NEEDED
Status: DISCONTINUED | OUTPATIENT
Start: 2020-02-19 | End: 2020-02-19 | Stop reason: HOSPADM

## 2020-02-19 RX ORDER — CELECOXIB 200 MG/1
200 CAPSULE ORAL DAILY
Qty: 14 CAP | Refills: 0 | Status: SHIPPED | OUTPATIENT
Start: 2020-02-19 | End: 2020-03-04

## 2020-02-19 RX ORDER — LIDOCAINE HYDROCHLORIDE 20 MG/ML
INJECTION, SOLUTION EPIDURAL; INFILTRATION; INTRACAUDAL; PERINEURAL AS NEEDED
Status: DISCONTINUED | OUTPATIENT
Start: 2020-02-19 | End: 2020-02-19 | Stop reason: HOSPADM

## 2020-02-19 RX ORDER — FENTANYL CITRATE 50 UG/ML
INJECTION, SOLUTION INTRAMUSCULAR; INTRAVENOUS AS NEEDED
Status: DISCONTINUED | OUTPATIENT
Start: 2020-02-19 | End: 2020-02-19 | Stop reason: HOSPADM

## 2020-02-19 RX ORDER — MIDAZOLAM HYDROCHLORIDE 1 MG/ML
INJECTION, SOLUTION INTRAMUSCULAR; INTRAVENOUS AS NEEDED
Status: DISCONTINUED | OUTPATIENT
Start: 2020-02-19 | End: 2020-02-19 | Stop reason: HOSPADM

## 2020-02-19 RX ORDER — CEPHALEXIN 500 MG/1
500 CAPSULE ORAL 4 TIMES DAILY
Qty: 4 CAP | Refills: 0 | Status: SHIPPED | OUTPATIENT
Start: 2020-02-19 | End: 2020-02-20

## 2020-02-19 RX ORDER — SODIUM CHLORIDE 0.9 % (FLUSH) 0.9 %
5-40 SYRINGE (ML) INJECTION AS NEEDED
Status: DISCONTINUED | OUTPATIENT
Start: 2020-02-19 | End: 2020-02-19 | Stop reason: HOSPADM

## 2020-02-19 RX ORDER — PROPOFOL 10 MG/ML
INJECTION, EMULSION INTRAVENOUS AS NEEDED
Status: DISCONTINUED | OUTPATIENT
Start: 2020-02-19 | End: 2020-02-19 | Stop reason: HOSPADM

## 2020-02-19 RX ORDER — SODIUM CHLORIDE, SODIUM LACTATE, POTASSIUM CHLORIDE, CALCIUM CHLORIDE 600; 310; 30; 20 MG/100ML; MG/100ML; MG/100ML; MG/100ML
100 INJECTION, SOLUTION INTRAVENOUS CONTINUOUS
Status: DISCONTINUED | OUTPATIENT
Start: 2020-02-19 | End: 2020-02-19 | Stop reason: HOSPADM

## 2020-02-19 RX ORDER — TRANEXAMIC ACID 650 1/1
1950 TABLET ORAL ONCE
Status: COMPLETED | OUTPATIENT
Start: 2020-02-19 | End: 2020-02-19

## 2020-02-19 RX ORDER — ROPIVACAINE HYDROCHLORIDE 5 MG/ML
INJECTION, SOLUTION EPIDURAL; INFILTRATION; PERINEURAL AS NEEDED
Status: DISCONTINUED | OUTPATIENT
Start: 2020-02-19 | End: 2020-02-19 | Stop reason: HOSPADM

## 2020-02-19 RX ORDER — METOCLOPRAMIDE HYDROCHLORIDE 5 MG/ML
INJECTION INTRAMUSCULAR; INTRAVENOUS AS NEEDED
Status: DISCONTINUED | OUTPATIENT
Start: 2020-02-19 | End: 2020-02-19 | Stop reason: HOSPADM

## 2020-02-19 RX ORDER — DEXTROSE MONOHYDRATE 100 MG/ML
125-250 INJECTION, SOLUTION INTRAVENOUS AS NEEDED
Status: DISCONTINUED | OUTPATIENT
Start: 2020-02-19 | End: 2020-02-19 | Stop reason: HOSPADM

## 2020-02-19 RX ORDER — INSULIN LISPRO 100 [IU]/ML
INJECTION, SOLUTION INTRAVENOUS; SUBCUTANEOUS ONCE
Status: DISCONTINUED | OUTPATIENT
Start: 2020-02-19 | End: 2020-02-19 | Stop reason: HOSPADM

## 2020-02-19 RX ORDER — ONDANSETRON 2 MG/ML
4 INJECTION INTRAMUSCULAR; INTRAVENOUS ONCE
Status: DISCONTINUED | OUTPATIENT
Start: 2020-02-19 | End: 2020-02-19 | Stop reason: HOSPADM

## 2020-02-19 RX ORDER — SODIUM CHLORIDE 0.9 % (FLUSH) 0.9 %
5-40 SYRINGE (ML) INJECTION EVERY 8 HOURS
Status: DISCONTINUED | OUTPATIENT
Start: 2020-02-19 | End: 2020-02-19 | Stop reason: HOSPADM

## 2020-02-19 RX ORDER — GUAIFENESIN 100 MG/5ML
81 LIQUID (ML) ORAL 2 TIMES DAILY
Qty: 42 TAB | Refills: 0 | Status: SHIPPED | OUTPATIENT
Start: 2020-02-19

## 2020-02-19 RX ORDER — PREGABALIN 100 MG/1
100 CAPSULE ORAL
Status: COMPLETED | OUTPATIENT
Start: 2020-02-19 | End: 2020-02-19

## 2020-02-19 RX ORDER — ONDANSETRON 2 MG/ML
INJECTION INTRAMUSCULAR; INTRAVENOUS AS NEEDED
Status: DISCONTINUED | OUTPATIENT
Start: 2020-02-19 | End: 2020-02-19 | Stop reason: HOSPADM

## 2020-02-19 RX ORDER — HYDROMORPHONE HYDROCHLORIDE 2 MG/ML
0.5 INJECTION, SOLUTION INTRAMUSCULAR; INTRAVENOUS; SUBCUTANEOUS
Status: DISCONTINUED | OUTPATIENT
Start: 2020-02-19 | End: 2020-02-19 | Stop reason: HOSPADM

## 2020-02-19 RX ORDER — PANTOPRAZOLE SODIUM 40 MG/1
40 TABLET, DELAYED RELEASE ORAL ONCE
Status: COMPLETED | OUTPATIENT
Start: 2020-02-19 | End: 2020-02-19

## 2020-02-19 RX ORDER — ACETAMINOPHEN 500 MG
1000 TABLET ORAL ONCE
Status: COMPLETED | OUTPATIENT
Start: 2020-02-19 | End: 2020-02-19

## 2020-02-19 RX ORDER — FENTANYL CITRATE 50 UG/ML
25 INJECTION, SOLUTION INTRAMUSCULAR; INTRAVENOUS AS NEEDED
Status: DISCONTINUED | OUTPATIENT
Start: 2020-02-19 | End: 2020-02-19 | Stop reason: HOSPADM

## 2020-02-19 RX ORDER — CELECOXIB 100 MG/1
400 CAPSULE ORAL
Status: COMPLETED | OUTPATIENT
Start: 2020-02-19 | End: 2020-02-19

## 2020-02-19 RX ORDER — DEXAMETHASONE SODIUM PHOSPHATE 4 MG/ML
INJECTION, SOLUTION INTRA-ARTICULAR; INTRALESIONAL; INTRAMUSCULAR; INTRAVENOUS; SOFT TISSUE AS NEEDED
Status: DISCONTINUED | OUTPATIENT
Start: 2020-02-19 | End: 2020-02-19 | Stop reason: HOSPADM

## 2020-02-19 RX ORDER — DEXAMETHASONE SODIUM PHOSPHATE 4 MG/ML
8 INJECTION, SOLUTION INTRA-ARTICULAR; INTRALESIONAL; INTRAMUSCULAR; INTRAVENOUS; SOFT TISSUE ONCE
Status: COMPLETED | OUTPATIENT
Start: 2020-02-19 | End: 2020-02-19

## 2020-02-19 RX ORDER — GLYCOPYRROLATE 0.2 MG/ML
INJECTION INTRAMUSCULAR; INTRAVENOUS AS NEEDED
Status: DISCONTINUED | OUTPATIENT
Start: 2020-02-19 | End: 2020-02-19 | Stop reason: HOSPADM

## 2020-02-19 RX ORDER — MAGNESIUM SULFATE 100 %
4 CRYSTALS MISCELLANEOUS AS NEEDED
Status: DISCONTINUED | OUTPATIENT
Start: 2020-02-19 | End: 2020-02-19 | Stop reason: HOSPADM

## 2020-02-19 RX ADMIN — MIDAZOLAM 2 MG: 1 INJECTION INTRAMUSCULAR; INTRAVENOUS at 09:20

## 2020-02-19 RX ADMIN — DEXAMETHASONE SODIUM PHOSPHATE 8 MG: 4 INJECTION, SOLUTION INTRAMUSCULAR; INTRAVENOUS at 07:49

## 2020-02-19 RX ADMIN — ACETAMINOPHEN 1000 MG: 500 TABLET ORAL at 07:49

## 2020-02-19 RX ADMIN — PROPOFOL 200 MG: 10 INJECTION, EMULSION INTRAVENOUS at 09:27

## 2020-02-19 RX ADMIN — TRANEXAMIC ACID 1950 MG: 650 TABLET ORAL at 07:49

## 2020-02-19 RX ADMIN — MIDAZOLAM 2 MG: 1 INJECTION INTRAMUSCULAR; INTRAVENOUS at 08:33

## 2020-02-19 RX ADMIN — METOCLOPRAMIDE 10 MG: 5 INJECTION, SOLUTION INTRAMUSCULAR; INTRAVENOUS at 09:26

## 2020-02-19 RX ADMIN — CEFAZOLIN SODIUM 3 G: 10 INJECTION, POWDER, FOR SOLUTION INTRAVENOUS at 09:20

## 2020-02-19 RX ADMIN — DEXMEDETOMIDINE HYDROCHLORIDE 10 MCG: 100 INJECTION, SOLUTION INTRAVENOUS at 09:26

## 2020-02-19 RX ADMIN — PREGABALIN 100 MG: 100 CAPSULE ORAL at 08:30

## 2020-02-19 RX ADMIN — ROPIVACAINE HYDROCHLORIDE 30 ML: 5 INJECTION, SOLUTION EPIDURAL; INFILTRATION; PERINEURAL at 08:36

## 2020-02-19 RX ADMIN — DEXAMETHASONE SODIUM PHOSPHATE 4 MG: 4 INJECTION, SOLUTION INTRAMUSCULAR; INTRAVENOUS at 09:24

## 2020-02-19 RX ADMIN — HYDROMORPHONE HYDROCHLORIDE 0.5 MG: 2 INJECTION, SOLUTION INTRAMUSCULAR; INTRAVENOUS; SUBCUTANEOUS at 11:17

## 2020-02-19 RX ADMIN — HYDROMORPHONE HYDROCHLORIDE 0.5 MG: 2 INJECTION, SOLUTION INTRAMUSCULAR; INTRAVENOUS; SUBCUTANEOUS at 11:47

## 2020-02-19 RX ADMIN — FENTANYL CITRATE 100 MCG: 50 INJECTION, SOLUTION INTRAMUSCULAR; INTRAVENOUS at 09:20

## 2020-02-19 RX ADMIN — PANTOPRAZOLE SODIUM 40 MG: 40 TABLET, DELAYED RELEASE ORAL at 07:49

## 2020-02-19 RX ADMIN — LIDOCAINE HYDROCHLORIDE 100 MG: 20 INJECTION, SOLUTION EPIDURAL; INFILTRATION; INTRACAUDAL; PERINEURAL at 09:27

## 2020-02-19 RX ADMIN — ONDANSETRON HYDROCHLORIDE 4 MG: 2 INJECTION INTRAMUSCULAR; INTRAVENOUS at 09:20

## 2020-02-19 RX ADMIN — CELECOXIB 400 MG: 100 CAPSULE ORAL at 08:30

## 2020-02-19 RX ADMIN — SODIUM CHLORIDE, SODIUM LACTATE, POTASSIUM CHLORIDE, AND CALCIUM CHLORIDE 1000 ML: 600; 310; 30; 20 INJECTION, SOLUTION INTRAVENOUS at 07:47

## 2020-02-19 RX ADMIN — GLYCOPYRROLATE 0.2 MG: 0.2 INJECTION INTRAMUSCULAR; INTRAVENOUS at 09:20

## 2020-02-19 RX ADMIN — SODIUM CHLORIDE, SODIUM LACTATE, POTASSIUM CHLORIDE, AND CALCIUM CHLORIDE 125 ML/HR: 600; 310; 30; 20 INJECTION, SOLUTION INTRAVENOUS at 07:47

## 2020-02-19 RX ADMIN — HYDROMORPHONE HYDROCHLORIDE 0.5 MG: 2 INJECTION, SOLUTION INTRAMUSCULAR; INTRAVENOUS; SUBCUTANEOUS at 11:27

## 2020-02-19 RX ADMIN — HYDROMORPHONE HYDROCHLORIDE 0.5 MG: 2 INJECTION, SOLUTION INTRAMUSCULAR; INTRAVENOUS; SUBCUTANEOUS at 11:37

## 2020-02-19 NOTE — PERIOP NOTES
Pt. Used restroom in pre-op area with assistance. Patient placed on Michael Paws for a minimum of 30 min in  Preop.

## 2020-02-19 NOTE — ANESTHESIA PREPROCEDURE EVALUATION
Relevant Problems   No relevant active problems       Anesthetic History   No history of anesthetic complications            Review of Systems / Medical History  Patient summary reviewed, nursing notes reviewed and pertinent labs reviewed    Pulmonary        Sleep apnea: No treatment    Asthma        Neuro/Psych   Within defined limits           Cardiovascular  Within defined limits              Pertinent negatives: No PAD and CABG  Exercise tolerance: >4 METS     GI/Hepatic/Renal           Liver disease (treated hep C)  Pertinent negatives: No GERD   Endo/Other        Morbid obesity and arthritis  Pertinent negatives: No hypothyroidism   Other Findings              Physical Exam    Airway  Mallampati: II  TM Distance: 4 - 6 cm  Neck ROM: normal range of motion, short neck   Mouth opening: Normal     Cardiovascular  Regular rate and rhythm,  S1 and S2 normal,  no murmur, click, rub, or gallop  Rhythm: regular  Rate: normal         Dental  No notable dental hx       Pulmonary  Breath sounds clear to auscultation               Abdominal  GI exam deferred       Other Findings            Anesthetic Plan    ASA: 3  Anesthesia type: general      Post-op pain plan if not by surgeon: peripheral nerve block single    Induction: Intravenous  Anesthetic plan and risks discussed with: Patient and Spouse

## 2020-02-19 NOTE — PROGRESS NOTES
Problem: Mobility Impaired (Adult and Pediatric)  Goal: *Acute Goals and Plan of Care (Insert Text)  Description  In 1-7 days pt will be able to perform:  ST.  Bed mobility:  Rolling L to R to L modified independent for positioning. 2.  Supine to sit to supine S with HR for meals. 3.  Sit to stand to sit S with RW in prep for ambulation. LT.  Gait:  Ambulate >150ft S with RW, WBAT, for home/community mobility. 2.  Stair Negotiation:  Ascend/descend >5 steps CGA with HR for home entry. 3.  Activity tolerance: Tolerate up in chair 1-2 hours for ADLs. 4.  Patient/Family Education:  Patient/family to be independent with HEP for follow-up care and safe discharge. Note:   PHYSICAL THERAPY EVALUATION    Patient: Day Medrano (51 y.o. male)  Date: 2020  Primary Diagnosis: Left knee DJD [M17.12]  Procedure(s) (LRB):  LEFT TOTAL KNEE ARTHROPLASTY (Left) Day of Surgery   Precautions:   Fall, WBAT    ASSESSMENT :  Based on the objective data described below, the patient presents with decreased functional mobility and independence in regard to bed mobility, transfers, gt quality and tolerance, L knee AROM, L knee strength, pain, balance, activity tolerance, stair negotiation and safety due to recent L TKA surgery. Pt rating pain on numerical pain scale 7/10. Pt sitting up in chair upon arrival.  Pt required CGA for sit<>stand. Pt required vc for safe techniques. Pt able to participate in gt training w/ RW, L KI, WBAT, GB and CGA in hallway w/ antalgic pattern. Pt able to void standing at  commode and perform own hygiene. Pt returned to sitting in chair w/ all needs within reach and ice pack to L knee. Nurse Darlin Cuellar aware and wife present. Recommend  upon hospital d/c. Patient will benefit from skilled intervention to address the above impairments.   Patients rehabilitation potential is considered to be Good  Factors which may influence rehabilitation potential include:   []         None noted  []         Mental ability/status  []         Medical condition  []         Home/family situation and support systems  []         Safety awareness  [x]         Pain tolerance/management  []         Other:      PLAN :  Recommendations and Planned Interventions:  [x]           Bed Mobility Training             []    Neuromuscular Re-Education  [x]           Transfer Training                   []    Orthotic/Prosthetic Training  [x]           Gait Training                          []    Modalities  [x]           Therapeutic Exercises          []    Edema Management/Control  [x]           Therapeutic Activities            [x]    Patient and Family Training/Education  []           Other (comment):    Frequency/Duration: Patient will be followed by physical therapy twice daily to address goals. Discharge Recommendations: Home Health  Further Equipment Recommendations for Discharge: N/A     SUBJECTIVE:   Patient stated Graham Parisi had my other knee done twice.     OBJECTIVE DATA SUMMARY:     Past Medical History:   Diagnosis Date    Arthritis     Asthma     exercise induced    Chronic pain     bilateral pain    Liver disease 1998    Hepatitis C-clear now    Osteoarthritis of right knee 12/14/2014    Pain due to knee joint prosthesis ([facility] [address] 4/3/2017    Small bowel obstruction ([facility] [address] 2012    resolved    Unspecified sleep apnea     No CPAP use     Past Surgical History:   Procedure Laterality Date    HX ADENOIDECTOMY      HX APPENDECTOMY  1992    HX KNEE ARTHROSCOPY Right 6-    x 2    HX KNEE ARTHROSCOPY Left     HX KNEE REPLACEMENT Right 02/2014    Revision    HX ROTATOR CUFF REPAIR Right     HX TONSILLECTOMY       Barriers to Learning/Limitations: None  Compensate with: visual, verbal, tactile, kinesthetic cues/model  Prior Level of Function/Home Situation:   Home Situation  Home Environment: Private residence  # Steps to Enter: 5  Rails to Enter: Yes  Hand Rails : Bilateral  One/Two Story Residence: Two story, live on 1st floor  Lift Chair Available: No  Living Alone: No  Support Systems: Spouse/Significant Other/Partner  Patient Expects to be Discharged to[de-identified] Private residence  Current DME Used/Available at Home: deepali Gunter  Critical Behavior:  Neurologic State: Alert; Appropriate for age  Orientation Level: Oriented X4  Cognition: Appropriate decision making; Appropriate for age attention/concentration; Appropriate safety awareness; Follows commands  Safety/Judgement: Awareness of environment  Psychosocial  Patient Behaviors: Calm; Cooperative  Family  Behaviors: Supportive;Calm  Skin Condition/Temp: Dry;Warm  Family  Behaviors: Supportive;Calm  Skin Integrity: Incision (comment)(L knee)  Skin Integumentary  Skin Color: Appropriate for ethnicity  Skin Condition/Temp: Dry;Warm  Skin Integrity: Incision (comment)(L knee)  Turgor: Non-tenting  Hair Growth: Present  Varicosities: Absent  Strength:    Strength: Generally decreased, functional  Tone & Sensation:   Tone: Normal  Sensation: Intact  Range Of Motion:  AROM: Generally decreased, functional  Functional Mobility:  Bed Mobility:  Transfers:  Sit to Stand: Contact guard assistance;Stand-by assistance(vc)  Stand to Sit: Contact guard assistance(vc)  Balance:   Sitting: Intact  Standing: Intact; With support  Ambulation/Gait Training:  Distance (ft): 120 Feet (ft)  Assistive Device: Walker, rolling;Gait belt;Brace/Splint  Ambulation - Level of Assistance: Contact guard assistance(vc)  Gait Abnormalities: Antalgic;Decreased step clearance; Step to gait  Left Side Weight Bearing: As tolerated  Base of Support: Shift to right  Stance: Left decreased  Speed/Susie: Slow  Step Length: Left shortened;Right shortened  Swing Pattern: Left asymmetrical;Right asymmetrical  Interventions: Safety awareness training; Tactile cues; Verbal cues; Visual/Demos  Therapeutic Exercises:   HEP written copy issued to pt per MD protocol. Reviewed HEP w/ pt and wife for competency.   Pain:  Pain Scale 1: Numeric (0 - 10)  Pain Intensity 1: 7  Pain Location 1: Knee  Pain Orientation 1: Left  Pain Description 1: Aching  Pain Intervention(s) 1: Declines; Ice  Activity Tolerance:   Fair   Please refer to the flowsheet for vital signs taken during this treatment. After treatment:   [x]         Patient left in no apparent distress sitting up in chair  []         Patient left in no apparent distress in bed  [x]         Call bell left within reach  [x]         Nursing notified  []         Caregiver present  []         Bed alarm activated    COMMUNICATION/EDUCATION:   [x]         Fall prevention education was provided and the patient/caregiver indicated understanding. [x]         Patient/family have participated as able in goal setting and plan of care. [x]         Patient/family agree to work toward stated goals and plan of care. []         Patient understands intent and goals of therapy, but is neutral about his/her participation. []         Patient is unable to participate in goal setting and plan of care.     Thank you for this referral.  Glen Lugo, PT   Time Calculation: 24 mins       Eval Complexity: History: HIGH Complexity :3+ comorbidities / personal factors will impact the outcome/ POC Exam:MEDIUM Complexity : 3 Standardized tests and measures addressing body structure, function, activity limitation and / or participation in recreation  Presentation: LOW Complexity : Stable, uncomplicated  Clinical Decision Making:Low Complexity    Overall Complexity:LOW

## 2020-02-19 NOTE — DISCHARGE INSTRUCTIONS
Rina Schafer III, MD Linette Leash, PA-C    Lower Extremity Surgery  Discharge Instructions      Please take the time to review the following instructions before you leave the hospital and use them as guidelines during your recovery from surgery. If you have any questions you may contact my office at [phone]. Wound Care/Dressing Changes:    [x]   You may change your dressing as needed. Beginning the 2 days after you are discharged from the hospital you can change your dressing daily. A big, bulky dressing isn't necessary as long as there isn't any drainage from the incisions. You will be shown how to change the dressings properly by the home health aids as well. There will be a piece of tape over your incision that resembles gauze. This tape should stay on and you should not attempt to remove it. Once you begin to get this wet in the shower this will begin to fall off on its own, although it will take days. Do not apply antibiotic ointment to your incisions. Showering/Bathing:    [x]   You may shower 2 days after surgery. Your dressing may be removed for showering. You may get your incisions wet in the shower. Don't vigorously scrub the area where your incisions are. Please pat dry the incision. Apply a clean, dry dressing after drying off the area of your incisions. Don't take a tub bath, get in a swimming pool or Jacuzzi until the incisions are completely healed, and you are seen in the office by Dr. Sajan Evans. Do not soak your incisions under water. []   Do not get the dressing wet. Once you remove it two days from surgery, you may get the incision wet if there is no drainage. Weight Bearing Status/Braces/Activity:    [x]   If you have had a total knee replacement you may weight bear as tolerated with the knee immobilizer in place. Use crutches, cane, or walker as needed. You should sleep in your knee immobilizer.   You need to begin working on range of motion early after your surgery. It is very important to work on extension (straighthening) the knee. You will be advanced with walking and range of motion by physical therapy at home.     []   If you have had a total hip replacement you may weight bear as tolerated. Physical therapy with come by your house to help you perform exercises and work on strength and range of motion. Ice/Elevation:    Continue ice and elevation consistently for 48 hours after surgery. If you have had a total knee replacement when elevating your knee elevate it on about 4 pillows to be sure it is above your heart. After 48 hours, you should ice your knee 3 times per day, for 20 minutes at a time for the next 5 days. After one week from surgery, you may use ice and elevation as needed for pain and swelling. Diet:    You may advance to your regular diet as tolerated. Medication:    1. You will be given a prescription for pain medications when you are discharged from the hospital.  Take the medication as needed according to the directions on the prescription bottle. Possible side effects of the medication include dizziness, headache, nausea, vomiting, constipation and urinary retention. If you experience any of these side effects call the office so that we can assist you in relieving them. Discontinue the use of the pain medication if you develop itching, rash, shortness of breath or difficulties swallowing. If these symptoms become severe or aren't relieved by discontinuing the medication you should seek immediate medical attention. Refills of pain medication are authorized during office hours only (8AM - 5PM Mon thru Fri). 2. If you were prescribed Percocet/oxycodone or Dilaudid/hydromorphone you must have a written prescription. These medications legally cannot be called in to a pharmacy. 3. You should take 1000 mg of tylenol salvador 8 hours. Do not exceed 3000 mg of Tylenol per day.   If you have been given Norco for post operative pain, do not take the tylenol. 4. You should use Aspirin 81 mg twice daily for 21 days from the date of your surgery. This will help to prevent blood clots from forming in your legs. This needs to be started the day after your surgery and home healthcare will teach you how this is done. If you are taking another medication such as Xarelto as discussed with Dr. Elizabeth Murillo this should also be started the day after the surgery. 5. You may resume the medications you were taking prior to your surgery, unless otherwise specified in your discharge instructions. Pain medication may change the effects of any antidepressant medication. If you have any questions about possible interactions between your regular medications and the pain medication you should consult the physician who prescribes your regular medications. 6. If you had a total joint as an outpatient procedure and did not spend the night in the hospital, Dr. Elizabeth Murillo has written for an oral antibiotic that you should take as instructed. This antibiotic is generally Keflex or Clindamycin. If you spent the night in the hospital the antibiotic was given to you through the IV and there is nothing else to take orally. Follow Up Appointment:    If you are unsure of your follow-up appointment date and time, please call [phone]. Please let our  know you are scheduling a post-op appointment. Most appointments should be between 7-14 days after your surgery. Physical Therapy:    [x]   Physical therapy will be discussed with you at your first follow-up appointment with Dr. Elizabeth Murillo. You don't need to begin physical therapy prior to that visit. You are to participate with [address] [place] [address] as arranged pre-operatively in the convience of your own home. Important signs and symptoms:    If any of the following signs and symptoms occurs, you should contact Dr. Elizabeth Murillo' office.   Please be advised if a problem arises which you feel required immediate medical attention or your are unable to contact Dr. Darylene Reusing' office you should seek immediate medical attention. Signs and symptoms to watch for include:  1. A sudden increase in swelling and/or redness or warmth at the area your surgery was performed which isn't relieved by rest, ice and elevation. 2. Oral temperature greater than 101.5 degrees for 12 hours or more which isn't relieved by an increase in fluid intake and taking two Tylenol every 4-6 hours. Do not exceed 3000 mg of Tylenol per day. 3. Excessive drainage from your incisions or drainage that hasn't stopped by 72 hours. 4. Calf pian, tenderness, redness or swelling which isn't relieved with rest and elevation. 5. Fever, chills, shortness of breath, chest pain, nausea, vomiting or other signs and symptoms which are of concern to you. DISCHARGE SUMMARY from Nurse    PATIENT INSTRUCTIONS:    After general anesthesia or intravenous sedation, for 24 hours or while taking prescription Narcotics:  · Limit your activities  · Do not drive and operate hazardous machinery  · Do not make important personal or business decisions  · Do  not drink alcoholic beverages  · If you have not urinated within 8 hours after discharge, please contact your surgeon on call. Report the following to your surgeon:  · Excessive pain, swelling, redness or odor of or around the surgical area  · Temperature over 100.5  · Nausea and vomiting lasting longer than 4 hours or if unable to take medications  · Any signs of decreased circulation or nerve impairment to extremity: change in color, persistent  numbness, tingling, coldness or increase pain  · Any questions    What to do at Home:  Recommended activity: Ambulate in house and No driving while on analgesics. If you experience any of the following symptoms listed above, please follow up with Dr. Darylene Reusing. *  Please give a list of your current medications to your Primary Care Provider.     *  Please update this list whenever your medications are discontinued, doses are      changed, or new medications (including over-the-counter products) are added. *  Please carry medication information at all times in case of emergency situations. These are general instructions for a healthy lifestyle:    No smoking/ No tobacco products/ Avoid exposure to second hand smoke  Surgeon General's Warning:  Quitting smoking now greatly reduces serious risk to your health. Obesity, smoking, and sedentary lifestyle greatly increases your risk for illness    A healthy diet, regular physical exercise & weight monitoring are important for maintaining a healthy lifestyle    You may be retaining fluid if you have a history of heart failure or if you experience any of the following symptoms:  Weight gain of 3 pounds or more overnight or 5 pounds in a week, increased swelling in our hands or feet or shortness of breath while lying flat in bed. Please call your doctor as soon as you notice any of these symptoms; do not wait until your next office visit. The discharge information has been reviewed with the patient and caregiver. The patient and caregiver verbalized understanding. Discharge medications reviewed with the patient and caregiver and appropriate educational materials and side effects teaching were provided. ___________________________________________________________________________________________________________________________________    Patient armband removed and shredded.

## 2020-02-19 NOTE — OP NOTES
Left Tourniquetless BiCruciate Retaining Total Knee Arthroplasty    Patient: Amee Camacho MRN: [id number]  [facility]: [id number]   YOB: 1960  Age: 64 y.o. Sex: male        PREOPERATIVE DIAGNOSIS:  Left severe tibiofemoral degenerative joint disease. POSTOPERATIVE DIAGNOSIS:  Left severe tibiofemoral degenerative joint disease. PROCEDURE PERFORMED:  Left bicruciate retaining cemented total knee arthroplasty using the [employer] Journey II XR with Visionaire blocks. IMPLANTS:   Implant Name Type Inv. Item Serial No.  Lot No. LRB No. Used Action   CEMENT BNE FAST SET 20GM -- ORDER IN SETS OF 20 - [id number]  CEMENT BNE FAST SET 20GM -- ORDER IN SETS OF 20  [facility] ORTHOPEDICS [id number] Left 1 Implanted   PAT OVL RESURF OWEN II 38MM --  - [id number]  PAT OVL RESURF OWEN II 38MM --   [facility] AND NEPH ORTHOPEDIC [id number] Left 1 Implanted   FEMORAL COMPONENT     [employer] NEPHEW ORTHOP[employer] [id number] Left 1 Implanted   LEFT MEDIAL ARTICULAR [employer] AND NEPH ORTHOPEDIC [id number] Left 1 Implanted   TIBIAL BASEPLATE    GLASS AND NEPH ORTHOPEDIC [id number] Left 1 Implanted   LEFT LATERAL ARTICULAR [employer] Der[employer] AND NEPH ORTHOPEDIC [id number] Left 1 Implanted        SURGEON:  Ralf Cordova MD    FIRST ASSISTANT:  Paulette Manrique PA-C    SECOND ASSISTANT: Physician Assistant: Marita Alberts PA-C    ANESTHESIA:  General    COMPLICATIONS:  None. TOURNIQUET TIME:  None. SPECIMEN TO PATHOLOGY:  * No specimens in log *    ESTIMATED BLOOD LOSS:  75cc      INDICATIONS:  This is a 64 y.o. WHITE OR   male   with known severe tibiofemoral DJD that is unresponsive to all conservative intervention. The patient has bone-on-bone arthritis by x-rays and has failed all conservative interventions including medications, weight loss, physical therapy, relative rest, ambulatory aids and injections.   The patient now presents for a bicruciate retaining total knee arthroplasty due to constant pain with activities of daily living and diminished safety due to patients pain. OPERATION:  The patient was brought to the operating theater and after adequate anesthesia, the surgical knee was prepped and draped in the typical sterile fashion. The 1.95gm of po tranexamic acid was already administered 2 hours before surgery. An anterior midline incision was then made from just above the patella to the tibial tubercle. The incision was taken down and medial and lateral flaps were developed using the argon wand. The analgesic cocktail used for the case was 50cc of .25% Marcaine with epinephrine mixed with 30 mg( 1cc ) of Toradol and 30cc of NS ( total of 81 cc). 40cc's was injected in the skin and capsule/quadriceps after the incision. A mid vastus approach to the knee was then performed. This was injected in the skin and in the capsule and around the VMO. The dissection was carried on the proximal medial tibia to about the mid lateral line and I undermined the patella to its insertion to get better visualization. The patella was everted. It was measured and it was cut from the original thickness of 27 to the residual thickness of about 17 mm. It was cut by free hand technique and it was cut from the medial articular edge to the lateral articular edge. The appropriate sized oblong patella guide was then placed on the cut surface of the patella with good fit and the 3 peg holes were drilled. The patella was then laterally electrocauterized and any excess patella was resected. .  The patella was then slid laterally and the knee flexed greater than 90 degrees. I used the patient matched Visionaire guide on the femur first.  It was then pinned with 4 pins, 2 distally and 2 proximally. This had excellent fit.   The distal resection was then made and extension gap was checked with the femoral gauge which showed the appropriate laxity medial and lateral.  The predetermined size five in 1 cutting block was then placed in position and the drill pins were used to hold it locked in place. I made all 5 distal femoral cuts at this time, making sure to keep the ACL out of harm's way. The guide was then removed and then I placed the tibial patient matched Visionaire guide on the proximal tibia and it lined up well with the proposed cuts. I then placed the 2 pins for the datum block. I checked the alignment with the drop lashanda and it was right to the first webspace. Another pin was placed in line with the medial aspect of the sagittal cut. The guide was then removed and the datum block was placed over the 2 pins that were left in place. The goalpost guide was then placed in position on either side of the ACL and the medial and lateral sagittal resections were then performed protecting my cut with the 2 drill pins that were already placed from anterior to posterior being sure they did not penetrate beyond the bone posteriorly. The medial tibial plateau resection was then performed at this level and then this medial piece of bone was removed. The remaining menisci was removed at this time and the femur was placed on the distal femur and this was trialled with the correct poly which gave appropriate slight laxity. At this point, attention was turned to the lateral joint. I placed the lateral saw capture cutting block into the drill hole towards the lateral  sagittal section that was performed. The patella was kept out of harm's way and a resection was performed with a sagittal saw from proximal to distal.  This piece of the femur was then removed. We placed the trial for the medial and lateral guides with the femur in position. At this point, it showed good stability, excellent range of motion and normal 1-2 mm of laxity of the medial and lateral joints. She had zero to 130 degrees of flexion.   The anterior chamfer on the lip of the femur was then performed and the 2 lug holes were drilled. The datum block was then placed back on the drill pins and the guide for the eminence box chisel for the anterior ACL cut ( just anterior to ACL insertion) was placed flush anteriorly and then this was used to transect to the appropriate depth and then the saw was used through the medial portion of the datum block to resect the small bony piece anteriorly. The guide system was then removed and the appropriate sized tibial guide was placed on top of the tibia which had excellent coverage. With this in position, it had 2 drill pins placed to hold it in place. I then put the tibial punch guide through the top of the 4 left tibial guide, which allowed 4 large drill pins to be placed and the punch was used to finish the tibia. These were all removed and the small curette on the field was used to remove any remaining bone in the keel. Once this was all done, the posterior part of   the knee was irrigated out. It was cauterized with the argon and about 20 mL of the Marcaine mixture was placed in the posterior part of the knee about 10 mL on both sides. The menisci had already been removed. The DePuy HV cement was mixed on the back table and the components were placed on the table. This had already been Simpulse lavaged. The tibia was cemented first and the cement was placed into the keel and also onto the backside of the tibial component. This was then impacted into the femur with good purchase. All excessive cement was removed with pickups and a knife. The polyethylenes were then snapped into position medially and laterally. The femur was then inserted in position and seated fully. All excessive cement was removed with pickups and a 15 blade. The knee was then extended fully and anterior femoral pressure applied. The knee was flexed again and any excess cement was removed again at this time. The patella was addressed last where the cement was placed on the patella.   It was put in position and held in by the clamp until the cemented had compressed fully. All excess cement was removed with pickups and a 15 blade. The clamp was removed. Range of motion was assessed and was zero to 130 degrees of knee flexion with great stability and good motion. The wound was then irrigated out again and the arthrotomy was closed with a running #1 Stratafix. The skin was closed with inverted 2-0 Vicryls and then the skin was sealed with Dermabond Prineo skin system. Any additional Marcaine was placed intra-articular at this time. This patient then was dressed with cast padding and Ace wrap from the toes to mid thigh, placed in a knee immobilizer and returned to recovery room awake and in stable condition. All instrument, sponge and needle counts were correct. A physician assistant was used during the surgery which contributes to better patient outcomes by decreasing operating room time and decreasing the amount of anesthesia the patient had to undergo. The physician assistant helped with positioning and draping of the patient for implantation of implants, retraction of soft tissues so as not to traumatize the tissues. During several parts of the procedure the PA used the Simpulse lavage to irrigate/suction the sterile field which contributed to a  surgical field and decrease in infection rates. The physician assistant used the cauterization under my guidance to decrease blood loss which limits the need for blood transfusion in the post operative period. The physician assistant instilled local anesthetic which decreased the need for post operative narcotics. During closure the physician assistant was able to close the fascia layer independently using a running Stratafix closure system ensuring a water tight fascia closure and decreasing the risk of deep francisco-prosthetic infection. The superficial tissues were closed using inverted 2-0 Vicryl sutures by the physician assistant as well. The skin was closed using an Exofin skin closure system so that there was no discharge from the incision. This helps contribute to a lower risk of infection. During the procedure the physician assistant was given the task of irrigating the wound allowing myself to prepare the prosthesis for implantation.        Danny Galvan MD  2/19/2020

## 2020-02-19 NOTE — ANESTHESIA PROCEDURE NOTES
Peripheral Block    Start time: 2020 8:33 AM  End time: 2020 8:37 AM  Performed by: Jesus Rizzo MD  Authorized by: Jesus Rizzo MD       Pre-procedure: Indications: at surgeon's request and post-op pain management    Preanesthetic Checklist: patient identified, risks and benefits discussed, site marked, timeout performed, anesthesia consent given and patient being monitored      Block Type:   Block Type:   Adductor canal  Laterality:  Left  Monitoring:  Standard ASA monitoring, continuous pulse ox, frequent vital sign checks, heart rate, responsive to questions and oxygen  Injection Technique:  Single shot  Procedures: ultrasound guided    Patient Position: supine  Prep: chlorhexidine    Needle Type:  Stimuplex  Needle Gauge:  21 G  Needle Localization:  Anatomical landmarks and ultrasound guidance    Assessment:  Number of attempts:  1  Injection Assessment:  Incremental injection every 5 mL, local visualized surrounding nerve on ultrasound, negative aspiration for blood, no paresthesia, no intravascular symptoms and ultrasound image on chart  Patient tolerance:  Patient tolerated the procedure well with no immediate complications  Margarito Breen   = [id number]  CSN = [id number]

## 2020-02-19 NOTE — PERIOP NOTES
TRANSFER - OUT REPORT:    Verbal report given to Courtney Romeo RN(name) on Amee Camacho  being transferred to phase 2(unit) for routine post - op       Report consisted of patients Situation, Background, Assessment and   Recommendations(SBAR). Information from the following report(s) SBAR, Kardex, OR Summary, Procedure Summary, Intake/Output and MAR was reviewed with the receiving nurse. Lines:   Peripheral IV 02/19/20 Left Forearm (Active)   Site Assessment Clean, dry, & intact 2/19/2020 11:40 AM   Phlebitis Assessment 0 2/19/2020 11:40 AM   Infiltration Assessment 0 2/19/2020 11:40 AM   Dressing Status Clean, dry, & intact 2/19/2020 11:40 AM   Dressing Type Transparent;Tape 2/19/2020 11:40 AM   Hub Color/Line Status Green; Infusing 2/19/2020 11:40 AM        Opportunity for questions and clarification was provided.       Patient transported with:   Registered Nurse  Tech

## 2020-02-19 NOTE — ANESTHESIA POSTPROCEDURE EVALUATION
Post-Anesthesia Evaluation and Assessment    Cardiovascular Function/Vital Signs  Visit Vitals  /88   Pulse 83   Temp 36.5 °C (97.7 °F)   Resp 10   Ht 5' 9.5\" (1.765 m)   Wt 121.2 kg (267 lb 2 oz)   SpO2 98%   BMI 38.88 kg/m²       Patient is status post Procedure(s):  LEFT TOTAL KNEE ARTHROPLASTY. Nausea/Vomiting: Controlled. Postoperative hydration reviewed and adequate. Pain:  Pain Scale 1: Visual (02/19/20 1150)  Pain Intensity 1: 0 (02/19/20 1150)   Managed. Neurological Status:   Neuro (WDL): Exceptions to WDL (02/19/20 0811)   At baseline. Mental Status and Level of Consciousness: Arousable. Pulmonary Status:   O2 Device: Nasal cannula (02/19/20 1137)   Adequate oxygenation and airway patent. Complications related to anesthesia: None    Post-anesthesia assessment completed. No concerns. Patient has met all discharge requirements.     Signed By: Jana Zapata MD    February 19, 2020

## 2020-02-20 ENCOUNTER — HOME CARE VISIT (OUTPATIENT)
Dept: SCHEDULING | Facility: HOME HEALTH | Age: 60
End: 2020-02-20
Payer: COMMERCIAL

## 2020-02-20 VITALS
HEART RATE: 75 BPM | OXYGEN SATURATION: 98 % | TEMPERATURE: 98.1 F | SYSTOLIC BLOOD PRESSURE: 126 MMHG | DIASTOLIC BLOOD PRESSURE: 84 MMHG | RESPIRATION RATE: 16 BRPM

## 2020-02-20 VITALS
HEART RATE: 70 BPM | OXYGEN SATURATION: 95 % | SYSTOLIC BLOOD PRESSURE: 124 MMHG | DIASTOLIC BLOOD PRESSURE: 72 MMHG | TEMPERATURE: 97.7 F

## 2020-02-20 PROCEDURE — 400013 HH SOC

## 2020-02-20 PROCEDURE — G0299 HHS/HOSPICE OF RN EA 15 MIN: HCPCS

## 2020-02-20 PROCEDURE — G0151 HHCP-SERV OF PT,EA 15 MIN: HCPCS

## 2020-02-21 ENCOUNTER — HOME CARE VISIT (OUTPATIENT)
Dept: SCHEDULING | Facility: HOME HEALTH | Age: 60
End: 2020-02-21
Payer: COMMERCIAL

## 2020-02-21 ENCOUNTER — HOME CARE VISIT (OUTPATIENT)
Dept: HOME HEALTH SERVICES | Facility: HOME HEALTH | Age: 60
End: 2020-02-21
Payer: COMMERCIAL

## 2020-02-21 VITALS
DIASTOLIC BLOOD PRESSURE: 76 MMHG | HEART RATE: 93 BPM | SYSTOLIC BLOOD PRESSURE: 128 MMHG | TEMPERATURE: 99.1 F | RESPIRATION RATE: 18 BRPM | OXYGEN SATURATION: 98 %

## 2020-02-21 PROCEDURE — G0299 HHS/HOSPICE OF RN EA 15 MIN: HCPCS

## 2020-02-22 ENCOUNTER — HOME CARE VISIT (OUTPATIENT)
Dept: SCHEDULING | Facility: HOME HEALTH | Age: 60
End: 2020-02-22
Payer: COMMERCIAL

## 2020-02-22 PROCEDURE — G0157 HHC PT ASSISTANT EA 15: HCPCS

## 2020-02-23 ENCOUNTER — HOME CARE VISIT (OUTPATIENT)
Dept: HOME HEALTH SERVICES | Facility: HOME HEALTH | Age: 60
End: 2020-02-23
Payer: COMMERCIAL

## 2020-02-23 VITALS
DIASTOLIC BLOOD PRESSURE: 86 MMHG | SYSTOLIC BLOOD PRESSURE: 114 MMHG | TEMPERATURE: 97.5 F | HEART RATE: 91 BPM | OXYGEN SATURATION: 96 %

## 2020-02-24 ENCOUNTER — HOME CARE VISIT (OUTPATIENT)
Dept: SCHEDULING | Facility: HOME HEALTH | Age: 60
End: 2020-02-24
Payer: COMMERCIAL

## 2020-02-24 VITALS
DIASTOLIC BLOOD PRESSURE: 82 MMHG | TEMPERATURE: 98.2 F | SYSTOLIC BLOOD PRESSURE: 118 MMHG | HEART RATE: 92 BPM | OXYGEN SATURATION: 96 %

## 2020-02-24 PROCEDURE — G0152 HHCP-SERV OF OT,EA 15 MIN: HCPCS

## 2020-02-24 PROCEDURE — G0157 HHC PT ASSISTANT EA 15: HCPCS

## 2020-02-25 VITALS
TEMPERATURE: 98.6 F | SYSTOLIC BLOOD PRESSURE: 124 MMHG | DIASTOLIC BLOOD PRESSURE: 84 MMHG | HEART RATE: 89 BPM | OXYGEN SATURATION: 97 %

## 2020-02-26 ENCOUNTER — HOME CARE VISIT (OUTPATIENT)
Dept: SCHEDULING | Facility: HOME HEALTH | Age: 60
End: 2020-02-26
Payer: COMMERCIAL

## 2020-02-26 PROCEDURE — G0157 HHC PT ASSISTANT EA 15: HCPCS

## 2020-02-28 ENCOUNTER — HOME CARE VISIT (OUTPATIENT)
Dept: SCHEDULING | Facility: HOME HEALTH | Age: 60
End: 2020-02-28
Payer: COMMERCIAL

## 2020-02-28 PROCEDURE — G0157 HHC PT ASSISTANT EA 15: HCPCS

## 2020-03-01 VITALS
TEMPERATURE: 97.6 F | DIASTOLIC BLOOD PRESSURE: 80 MMHG | TEMPERATURE: 98.4 F | HEART RATE: 84 BPM | DIASTOLIC BLOOD PRESSURE: 80 MMHG | SYSTOLIC BLOOD PRESSURE: 102 MMHG | HEART RATE: 83 BPM | OXYGEN SATURATION: 96 % | OXYGEN SATURATION: 95 % | SYSTOLIC BLOOD PRESSURE: 130 MMHG

## 2020-03-02 ENCOUNTER — HOME CARE VISIT (OUTPATIENT)
Dept: SCHEDULING | Facility: HOME HEALTH | Age: 60
End: 2020-03-02
Payer: COMMERCIAL

## 2020-03-02 VITALS
HEART RATE: 64 BPM | TEMPERATURE: 98.1 F | OXYGEN SATURATION: 97 % | DIASTOLIC BLOOD PRESSURE: 70 MMHG | SYSTOLIC BLOOD PRESSURE: 130 MMHG

## 2020-03-02 PROCEDURE — G0151 HHCP-SERV OF PT,EA 15 MIN: HCPCS

## 2020-03-05 ENCOUNTER — HOSPITAL ENCOUNTER (OUTPATIENT)
Dept: PHYSICAL THERAPY | Age: 60
Discharge: HOME | End: 2020-03-05
Payer: COMMERCIAL

## 2020-03-05 PROCEDURE — 97116 GAIT TRAINING THERAPY: CPT | Performed by: PHYSICAL THERAPIST

## 2020-03-05 PROCEDURE — 97110 THERAPEUTIC EXERCISES: CPT | Performed by: PHYSICAL THERAPIST

## 2020-03-05 PROCEDURE — 97162 PT EVAL MOD COMPLEX 30 MIN: CPT | Performed by: PHYSICAL THERAPIST

## 2020-03-05 NOTE — PROGRESS NOTES
PHYSICAL THERAPY - DAILY TREATMENT NOTE    Patient Name: Tonya Kennedy        Date: 3/5/2020  : 1960   YES Patient  Verified  Visit #:     Insurance: Payor: [employer] [facility] / Plan: [facility] [employer] [address] / Product Type: Commerical /      In time: 10:05 Out time: 11:05   Total Treatment Time: 60     BCBS/Medicare Time Tracking (below)   Total Timed Codes (min):  na 1:1 Treatment Time:  na     TREATMENT AREA =  Left knee pain [M25.562]    SUBJECTIVE  Pain Level (on 0 to 10 scale):  5  / 10   Medication Changes/New allergies or changes in medical history, any new surgeries or procedures?     NO    If yes, update Summary List   Subjective Functional Status/Changes:  []  No changes reported     See eval/POC           Modalities Rationale:     decrease edema, decrease inflammation and decrease pain to improve patient's ability to prevent soreness   min [] Estim, type/location:                                      []  att     []  unatt     []  w/US     []  w/ice    []  w/heat    min []  Mechanical Traction: type/lbs                   []  pro   []  sup   []  int   []  cont    []  before manual    []  after manual    min []  Ultrasound, settings/location:      min []  Iontophoresis w/ dexamethasone, location:                                               []  take home patch       []  in clinic   10 min [x]  Ice     []  Heat    location/position: L Knee - supine after treatment    min []  Vasopneumatic Device, press/temp:     min []  Other:    [] Skin assessment post-treatment (if applicable):    []  intact    []  redness- no adverse reaction     []redness - adverse reaction:        15 min Therapeutic Exercise:  [x]  See flow sheet   Rationale:      increase ROM, increase strength, improve coordination and increase proprioception to improve the patients ability to increase activity tolerance     10 min Gait Training:  Practiced TKE in standing followed TKE  L, then walking with heel strike at initial contact an during L single limb suppport w SPC for 200 ft   Rationale:      increase ROM, increase strength, improve coordination and increase proprioception to improve the patients ability to increase activity tolerance     Billed With/As:   [] TE   [] TA   [] Neuro   [] Self Care Patient Education: [x] Review HEP    [] Progressed/Changed HEP based on:   [] positioning   [] body mechanics   [] transfers   [] heat/ice application    [] other:      Other Objective/Functional Measures:    FOTO - 30     Post Treatment Pain Level (on 0 to 10) scale:   4  / 10     ASSESSMENT  Assessment/Changes in Function:     Pt has swelling, pain, ROM low, weakness, and limited ambulation s/p L TKA. []  See Progress Note/Recertification   Patient will continue to benefit from skilled PT services to modify and progress therapeutic interventions, address functional mobility deficits, address ROM deficits, address strength deficits, analyze and address soft tissue restrictions, analyze and cue movement patterns, analyze and modify body mechanics/ergonomics, assess and modify postural abnormalities and address imbalance/dizziness to attain remaining goals.    Progress toward goals / Updated goals:    Goals established today     PLAN  [x]  Upgrade activities as tolerated YES Continue plan of care   []  Discharge due to :    []  Other:      Therapist: Dyllan Grubbs PT    Date: 3/5/2020 Time: 9:10 AM     Future Appointments   Date Time Provider Brice Bush   3/5/2020 10:00 AM Rowena Wise, PT [facility] [place]

## 2020-03-05 NOTE — PROGRESS NOTES
[facility] [address] [place] [address]  Jarek Keenan [address], [address] [place] [id number], [address]  Phone: [phone]  Fax: [phone] / STATEMENT OF MEDICAL NECESSITY FOR PHYSICAL THERAPY SERVICES  Patient Name: Daisy Mohan : 1960   Medical   Diagnosis: Left knee pain [M25.562] Treatment Diagnosis: L Knee Pain   Onset Date: 20     Referral Source: Rehan Bridges MD [place] [facility]): 3/5/2020   Prior Hospitalization: See medical history Provider #: [id number]   Prior Level of Function: Progressive worsening of pain with distance ambulation and stair climbing   Comorbidities: H/o R unicompartment knee arthroplasty and revision (, )   Medications: Verified on Patient Summary List   The Plan of Care and following information is based on the information from the initial evaluation.   ===========================================================================================  Assessment / key information:  65 y/o male presents to PT with c/o L knee pain since TKA on 20. He did 5 sessions of HHPT and is doing exercises daily. Pt has moderate swelling in L knee and into his lower leg. His ROM is  -10 degrees from full extension and 104 degrees of flexion. He is walking w SPC, but has extension lag on L. With stretching, he was able to achieve -4 degrees from full extension. He could not perform a SLR without a lag. Mr Margoth Hubbard was able to walk without an extension lag after doing stretches, and basic quad strengthening. He demonstrates good understanding of extension biased HEP and RICE principle to reduce swelling.   Pt has swelling, pain, ROM loss, weakness, and limited ambulation s/p L TKA.   ===========================================================================================  Eval Complexity: History MEDIUM  Complexity : 1-2 comorbidities / personal factors will impact the outcome/ POC ;  Examination  MEDIUM Complexity : 3 Standardized tests and measures addressing body structure, function, activity limitation and / or participation in recreation ; Presentation MEDIUM Complexity : Evolving with changing characteristics ; Decision Making MEDIUM Complexity : FOTO score of 26-74; Overall Complexity MEDIUM  Problem List: pain affecting function, decrease ROM, decrease strength, edema affecting function, impaired gait/ balance, decrease ADL/ functional abilitiies, decrease activity tolerance, decrease flexibility/ joint mobility and decrease transfer abilities   Treatment Plan may include any combination of the following: Therapeutic exercise, Therapeutic activities, Neuromuscular re-education, Physical agent/modality, Gait/balance training, Manual therapy, Dry Needling, Aquatic therapy, Patient education, Self Care training, Functional mobility training, Home safety training and Stair training  Patient / Family readiness to learn indicated by: asking questions, trying to perform skills and interest  Persons(s) to be included in education: patient (P)  Barriers to Learning/Limitations: None  Measures taken:    Patient Goal (s): \"straighten leg, walk, maybe return to everyday activities, golf, wlaking dog\"   Patient self reported health status: good  Rehabilitation Potential: good   Short Term Goals: To be accomplished in  2  weeks:  1. Pt independent with basic extension biased flexibility and strengthening. 2. Pt to attain full L knee extension to allow walking without an extension lag. 3. Pt able to perform 10 SLR without a lag to allow improved gait mechanics.  Long Term Goals: To be accomplished in  6  weeks:  1. Pt to increase FOTO score from 30 to >/= 57.  2. Pt independent with high level HEP for L knee flexibility, strengthening/endurance, and proprioception. 3. Pt to achieve 0-120 degrees of ROM to allow painfree ADLs. 4. Pt able to go up/down a flight of stairs with reciprocal steps without any knee pain.   Frequency / Duration:   Patient to be seen  2-3  times per week for 6  weeks:  Patient / Caregiver education and instruction: self care, activity modification and exercises    Therapist Signature: Crow Romero PT Date: 9/5/9064   Certification Period: NA Time: 9:09 AM   ===========================================================================================  I certify that the above Physical Therapy Services are being furnished while the patient is under my care. I agree with the treatment plan and certify that this therapy is necessary. Physician Signature:        Date:       Time:     Please sign and return to [facility] or you may fax the signed copy to [phone]. Thank you.

## 2020-03-09 ENCOUNTER — HOSPITAL ENCOUNTER (OUTPATIENT)
Dept: PHYSICAL THERAPY | Age: 60
Discharge: HOME | End: 2020-03-09
Payer: COMMERCIAL

## 2020-03-09 PROCEDURE — 97110 THERAPEUTIC EXERCISES: CPT | Performed by: PHYSICAL THERAPIST

## 2020-03-09 NOTE — PROGRESS NOTES
PHYSICAL THERAPY - DAILY TREATMENT NOTE    Patient Name: Daniel Maria        Date: 3/9/2020  : 1960   YES Patient  Verified  Visit #:     Insurance: Payor: [employer] [facility] / Plan: [facility] [employer] [address] / Product Type: Commerical /      In time: 10:00 Out time: 10:55   Total Treatment Time: 55     BCBS/Medicare Time Tracking (below)   Total Timed Codes (min):  na 1:1 Treatment Time:  na     TREATMENT AREA =  Left knee pain [M25.562]    SUBJECTIVE  Pain Level (on 0 to 10 scale):  3  / 10   Medication Changes/New allergies or changes in medical history, any new surgeries or procedures? NO    If yes, update Summary List   Subjective Functional Status/Changes:  []  No changes reported     Pt reports obtaining a compression stocking for L knee and his swelling has decreased significantly. He notes talking with Dr Ariane Stewart and was told to keep using hot packs.           Modalities Rationale:     decrease edema, decrease inflammation and decrease pain to improve patient's ability to prevent soreness   min [] Estim, type/location:                                      []  att     []  unatt     []  w/US     []  w/ice    []  w/heat    min []  Mechanical Traction: type/lbs                   []  pro   []  sup   []  int   []  cont    []  before manual    []  after manual    min []  Ultrasound, settings/location:      min []  Iontophoresis w/ dexamethasone, location:                                               []  take home patch       []  in clinic   10 min [x]  Ice     []  Heat    location/position: L Knee - supine after treatment    min []  Vasopneumatic Device, press/temp:     min []  Other:    [x] Skin assessment post-treatment (if applicable):    [x]  intact    [x]  redness- no adverse reaction     []redness - adverse reaction:        40 min Therapeutic Exercise:  [x]  See flow sheet   Rationale:      increase ROM, increase strength, improve coordination and increase proprioception to improve the patients ability to increase activity tolerance     5 min Gait Training:  Practiced TKE in standing followed TKE w WS L, then walking with heel strike at initial contact an during L single limb suppport w and without  SPC for 200 ft   Rationale:      increase ROM, increase strength, improve coordination and increase proprioception to improve the patients ability to increase activity tolerance     Billed With/As:   [] TE   [] TA   [] Neuro   [] Self Care Patient Education: [x] Review HEP    [] Progressed/Changed HEP based on:   [] positioning   [] body mechanics   [] transfers   [] heat/ice application    [] other:      Other Objective/Functional Measures: Added standing TKE w band, mini squats and single leg stance for balance    Achieved 111 degrees flexion today     Post Treatment Pain Level (on 0 to 10) scale:   2  / 10     ASSESSMENT  Assessment/Changes in Function:     Pt able to perform SLR without lag today, 2 x10 after given VCs.     []  See Progress Note/Recertification   Patient will continue to benefit from skilled PT services to modify and progress therapeutic interventions, address functional mobility deficits, address ROM deficits, address strength deficits, analyze and address soft tissue restrictions, analyze and cue movement patterns, analyze and modify body mechanics/ergonomics, assess and modify postural abnormalities and address imbalance/dizziness to attain remaining goals. Progress toward goals / Updated goals:    Making good progress with extension biased ROM and strength while decreasing swelling/pain.      PLAN  [x]  Upgrade activities as tolerated YES Continue plan of care   []  Discharge due to :    []  Other:      Therapist: Crow Romero PT    Date: 3/9/2020 Time: 9:10 AM     Future Appointments   Date Time Provider Brice Bush   3/11/2020  3:00 PM Keith Flannery [facility]   3/13/2020  9:30 AM Atif Chatterjee PT [facility]   3/16/2020 12:00 PM Marilee Easton [facility]   3/18/2020  4:30 PM Arn Rehan, PT [facility]   [facility]2020  [facility]   3/23/2020  3:30 PM Arn Rehan, PT [facility]   3/25/2020  1:30 PM Arn Rehan, PT [facility]   3/27/2020  9:00 AM Lucila Gloria [facility]   3/30/2020  3:30 PM Arn Rehan, PT [facility]   4/1/2020  4:00 PM Arn Rehan, PT [facility]   4/3/2020  9:30 AM Lucila Gloria [facility]

## 2020-03-11 ENCOUNTER — HOSPITAL ENCOUNTER (OUTPATIENT)
Dept: PHYSICAL THERAPY | Age: 60
Discharge: HOME | End: 2020-03-11
Payer: COMMERCIAL

## 2020-03-11 PROCEDURE — 97110 THERAPEUTIC EXERCISES: CPT | Performed by: PHYSICAL THERAPIST

## 2020-03-11 NOTE — PROGRESS NOTES
PHYSICAL THERAPY - DAILY TREATMENT NOTE    Patient Name: Bren Ferrell        Date: 3/11/2020  : 1960   YES Patient  Verified  Visit #:   3   of   18  Insurance: Payor: ELZBIETA Melton / Plan: [facility] [employer] [address] / Product Type: Commerical /      In time: 3:00 Out time: 3:55   Total Treatment Time: 55     BCBS/Medicare Time Tracking (below)   Total Timed Codes (min):  na 1:1 Treatment Time:  na     TREATMENT AREA =  Left knee pain [M25.562]    SUBJECTIVE  Pain Level (on 0 to 10 scale):  2-3  / 10   Medication Changes/New allergies or changes in medical history, any new surgeries or procedures? NO    If yes, update Summary List   Subjective Functional Status/Changes:  []  No changes reported     Pt reports awakening this morning with increased pain and stiffness in knee. He worked a lot Tuesday and did not use cold packs, just heat.           Modalities Rationale:     decrease edema, decrease inflammation and decrease pain to improve patient's ability to prevent soreness   min [] Estim, type/location:                                      []  att     []  unatt     []  w/US     []  w/ice    []  w/heat    min []  Mechanical Traction: type/lbs                   []  pro   []  sup   []  int   []  cont    []  before manual    []  after manual    min []  Ultrasound, settings/location:      min []  Iontophoresis w/ dexamethasone, location:                                               []  take home patch       []  in clinic   10 min [x]  Ice     []  Heat    location/position: L Knee - supine after treatment    min []  Vasopneumatic Device, press/temp:     min []  Other:    [x] Skin assessment post-treatment (if applicable):    [x]  intact    [x]  redness- no adverse reaction     []redness - adverse reaction:        40 min Therapeutic Exercise:  [x]  See flow sheet   Rationale:      increase ROM, increase strength, improve coordination and increase proprioception to improve the patients ability to increase activity tolerance     5 min Gait Training:  Practiced TKE in standing followed TKE w WS L, then walking with heel strike at initial contact an during L single limb suppport w and without  SPC for 200 ft   Rationale:      increase ROM, increase strength, improve coordination and increase proprioception to improve the patients ability to increase activity tolerance     Billed With/As:   [] TE   [] TA   [] Neuro   [] Self Care Patient Education: [x] Review HEP    [] Progressed/Changed HEP based on:   [] positioning   [] body mechanics   [] transfers   [] heat/ice application    [] other:      Other Objective/Functional Measures: Added standing 4 inch step ups and recumbent bike     Achieved 112° flexion today     Post Treatment Pain Level (on 0 to 10) scale:   2  / 10     ASSESSMENT  Assessment/Changes in Function:     Pt able to achieve near full extension with stretching today. []  See Progress Note/Recertification   Patient will continue to benefit from skilled PT services to modify and progress therapeutic interventions, address functional mobility deficits, address ROM deficits, address strength deficits, analyze and address soft tissue restrictions, analyze and cue movement patterns, analyze and modify body mechanics/ergonomics, assess and modify postural abnormalities and address imbalance/dizziness to attain remaining goals. Progress toward goals / Updated goals:    Porogressing well with regaining extension ROM and strength.      PLAN  [x]  Upgrade activities as tolerated YES Continue plan of care   []  Discharge due to :    []  Other:      Therapist: Anil Bermeo PT    Date: 3/11/2020 Time: 9:10 AM     Future Appointments   Date Time Provider Brice Bush   3/11/2020  3:00 PM Kassie Foot [facility]   3/13/2020  9:30 AM Rox Mcgrath PT [facility]   3/16/2020 12:00 PM Rox Mcgrath PT [facility]   3/18/2020  4:30 PM Daniel Wise, PT [facility] 3/20/2020  9:30 AM Allie Priteo [facility]   3/23/2020  3:30 PM Jeimy Slater, [place] [facility]   3/25/2020  1:30 PM Jeimy Slater, PT [facility]   3/27/2020  9:00 AM Allie Prieto [facility]   3/30/2020  3:30 PM Jeimy Slater, PT [facility]   4/1/2020  4:00 PM Jeimy Slater, PT [facility]   4/3/2020  9:30 AM Allie Prieto [facility]

## 2020-03-13 ENCOUNTER — HOSPITAL ENCOUNTER (OUTPATIENT)
Dept: PHYSICAL THERAPY | Age: 60
Discharge: HOME | End: 2020-03-13
Payer: COMMERCIAL

## 2020-03-13 PROCEDURE — 97110 THERAPEUTIC EXERCISES: CPT | Performed by: PHYSICAL THERAPIST

## 2020-03-13 NOTE — PROGRESS NOTES
PHYSICAL THERAPY - DAILY TREATMENT NOTE    Patient Name: Ellen Mcburney        Date: 3/13/2020  : 1960   YES Patient  Verified  Visit #:     Insurance: Payor: ELZBIETA Melton / Plan: [facility] [employer] [address] [facility] / Product Type: Commerical /      In time: 9:30 Out time: 10:30   Total Treatment Time: 55     BCBS/Medicare Time Tracking (below)   Total Timed Codes (min):  na 1:1 Treatment Time:  na     TREATMENT AREA =  Left knee pain [M25.562]    SUBJECTIVE  Pain Level (on 0 to 10 scale):  2  / 10   Medication Changes/New allergies or changes in medical history, any new surgeries or procedures? NO    If yes, update Summary List   Subjective Functional Status/Changes:  []  No changes reported     Pt reports having pain when sitting/driving car, but otherwise doing well with walking.           Modalities Rationale:     decrease edema, decrease inflammation and decrease pain to improve patient's ability to prevent soreness   min [] Estim, type/location:                                      []  att     []  unatt     []  w/US     []  w/ice    []  w/heat    min []  Mechanical Traction: type/lbs                   []  pro   []  sup   []  int   []  cont    []  before manual    []  after manual    min []  Ultrasound, settings/location:      min []  Iontophoresis w/ dexamethasone, location:                                               []  take home patch       []  in clinic   10 min [x]  Ice     []  Heat    location/position: L Knee - supine after treatment    min []  Vasopneumatic Device, press/temp:     min []  Other:    [x] Skin assessment post-treatment (if applicable):    [x]  intact    [x]  redness- no adverse reaction     []redness - adverse reaction:        45 min Therapeutic Exercise:  [x]  See flow sheet   Rationale:      increase ROM, increase strength, improve coordination and increase proprioception to improve the patients ability to increase activity tolerance Billed With/As:   [] TE   [] TA   [] Neuro   [] Self Care Patient Education: [x] Review HEP    [] Progressed/Changed HEP based on:   [] positioning   [] body mechanics   [] transfers   [] heat/ice application    [] other:      Other Objective/Functional Measures:    Increased step ups to 2 sets    Flexed to 118 degrees otday     Post Treatment Pain Level (on 0 to 10) scale:   2  / 10     ASSESSMENT  Assessment/Changes in Function:     Pt is progressing well with regaining ROM and functional strength/endurance. []  See Progress Note/Recertification   Patient will continue to benefit from skilled PT services to modify and progress therapeutic interventions, address functional mobility deficits, address ROM deficits, address strength deficits, analyze and address soft tissue restrictions, analyze and cue movement patterns, analyze and modify body mechanics/ergonomics, assess and modify postural abnormalities and address imbalance/dizziness to attain remaining goals. Progress toward goals / Updated goals:    Progresisng well toward goals.      PLAN  [x]  Upgrade activities as tolerated YES Continue plan of care   []  Discharge due to :    []  Other:      Therapist: Elena Beth PT    Date: 3/13/2020 Time: 9:10 AM     Future Appointments   Date Time Provider Brice Bush   3/13/2020  9:30 AM Slade Mckee [facility]   3/16/2020 12:00 PM Slade Mckee [facility]   3/18/2020  4:30 PM Briana Wise, [facility]   3/20/2020  9:30 AM Felisa CARRENO [facility]   3/23/2020  3:30 PM Slade Mckee [facility]   3/25/2020  1:30 PM Slade Mckee [facility]   3/27/2020  9:00 AM Willian Allen [facility]   3/30/2020  3:30 PM Slade Mckee [facility]   4/1/2020  4:00 PM Slade Mckee [facility]   4/3/2020  9:30 AM Willian Allen [facility]

## 2020-03-16 ENCOUNTER — HOSPITAL ENCOUNTER (OUTPATIENT)
Dept: PHYSICAL THERAPY | Age: 60
Discharge: HOME | End: 2020-03-16
Payer: COMMERCIAL

## 2020-03-16 PROCEDURE — 97110 THERAPEUTIC EXERCISES: CPT | Performed by: PHYSICAL THERAPIST

## 2020-03-16 NOTE — PROGRESS NOTES
PHYSICAL THERAPY - DAILY TREATMENT NOTE    Patient Name: Tomasa Contreras        Date: 3/16/2020  : 1960   YES Patient  Verified  Visit #:     Insurance: Payor: ELZBIETA Melton / Plan: [facility] [employer]ICAL [employer] [address] [facility] / Product Type: Commerical /      In time: 11:55 Out time: 12:50   Total Treatment Time: 55     BCBS/Medicare Time Tracking (below)   Total Timed Codes (min):  na 1:1 Treatment Time:  na     TREATMENT AREA =  Left knee pain [M25.562]    SUBJECTIVE  Pain Level (on 0 to 10 scale):  2  / 10   Medication Changes/New allergies or changes in medical history, any new surgeries or procedures? NO    If yes, update Summary List   Subjective Functional Status/Changes:  []  No changes reported     Pt reports taking some stretching in the morning to get knee straight, but able to do so.            Modalities Rationale:     decrease edema, decrease inflammation and decrease pain to improve patient's ability to prevent soreness   min [] Estim, type/location:                                      []  att     []  unatt     []  w/US     []  w/ice    []  w/heat    min []  Mechanical Traction: type/lbs                   []  pro   []  sup   []  int   []  cont    []  before manual    []  after manual    min []  Ultrasound, settings/location:      min []  Iontophoresis w/ dexamethasone, location:                                               []  take home patch       []  in clinic   10 min [x]  Ice     []  Heat    location/position: L Knee - supine after treatment    min []  Vasopneumatic Device, press/temp:     min []  Other:    [x] Skin assessment post-treatment (if applicable):    [x]  intact    [x]  redness- no adverse reaction     []redness - adverse reaction:        45 min Therapeutic Exercise:  [x]  See flow sheet   Rationale:      increase ROM, increase strength, improve coordination and increase proprioception to improve the patients ability to increase activity tolerance Billed With/As:   [] TE   [] TA   [] Neuro   [] Self Care Patient Education: [x] Review HEP    [] Progressed/Changed HEP based on:   [] positioning   [] body mechanics   [] transfers   [] heat/ice application    [] other:      Other Objective/Functional Measures:    Increased height of step to 6 inches today and added 1 lb to SLR     Post Treatment Pain Level (on 0 to 10) scale:   2  / 10     ASSESSMENT  Assessment/Changes in Function:     Good tolerance to all exercise advancements. []  See Progress Note/Recertification   Patient will continue to benefit from skilled PT services to modify and progress therapeutic interventions, address functional mobility deficits, address ROM deficits, address strength deficits, analyze and address soft tissue restrictions, analyze and cue movement patterns, analyze and modify body mechanics/ergonomics, assess and modify postural abnormalities and address imbalance/dizziness to attain remaining goals. Progress toward goals / Updated goals:    Making good progress with restoring ROM and strength.       PLAN  [x]  Upgrade activities as tolerated YES Continue plan of care   []  Discharge due to :    []  Other:      Therapist: Kym Mckeon PT    Date: 3/16/2020 Time: 9:10 AM     Future Appointments   Date Time Provider Brice Bush   3/16/2020 12:00 PM Trino Panchal [facility]   3/18/2020  4:30 PM Patrice Wise, [facility]   3/20/2020  9:30 AM Monique CARRENO [facility]   3/23/2020  3:30 PM Trino Panchal PT [facility]   3/25/2020  1:30 PM Trino Panchal [facility]   3/27/2020  9:00 AM   [facility]   3/30/2020  3:30 PM Trino Panchal PT [facility]   4/1/2020  4:00 PM Trino Panchal [facility]   4/3/2020  9:30 AM  [facility]

## 2020-03-18 ENCOUNTER — HOSPITAL ENCOUNTER (OUTPATIENT)
Dept: PHYSICAL THERAPY | Age: 60
Discharge: HOME | End: 2020-03-18
Payer: COMMERCIAL

## 2020-03-18 PROCEDURE — 97110 THERAPEUTIC EXERCISES: CPT | Performed by: PHYSICAL THERAPIST

## 2020-03-18 NOTE — PROGRESS NOTES
PHYSICAL THERAPY - DAILY TREATMENT NOTE    Patient Name: Jeanine Greenfield        Date: 3/18/2020  : 1960   YES Patient  Verified  Visit #:     Insurance: Payor: ELZBIETA Melton / Plan: [facility] [employer] [address] / Product Type: Commerical /      In time: 4:30 Out time: 5:20   Total Treatment Time: 50     BCBS/Medicare Time Tracking (below)   Total Timed Codes (min):  na 1:1 Treatment Time:  na     TREATMENT AREA =  Left knee pain [M25.562]    SUBJECTIVE  Pain Level (on 0 to 10 scale):  2  / 10   Medication Changes/New allergies or changes in medical history, any new surgeries or procedures? NO    If yes, update Summary List   Subjective Functional Status/Changes:  []  No changes reported     Pt reports having pain with prolonged driving, and he still feels swollen at times.           Modalities Rationale:     decrease edema, decrease inflammation and decrease pain to improve patient's ability to prevent soreness   min [] Estim, type/location:                                      []  att     []  unatt     []  w/US     []  w/ice    []  w/heat    min []  Mechanical Traction: type/lbs                   []  pro   []  sup   []  int   []  cont    []  before manual    []  after manual    min []  Ultrasound, settings/location:      min []  Iontophoresis w/ dexamethasone, location:                                               []  take home patch       []  in clinic   10 min [x]  Ice     []  Heat    location/position: L Knee - supine after treatment    min []  Vasopneumatic Device, press/temp:     min []  Other:    [x] Skin assessment post-treatment (if applicable):    [x]  intact    [x]  redness- no adverse reaction     []redness - adverse reaction:        40 min Therapeutic Exercise:  [x]  See flow sheet   Rationale:      increase ROM, increase strength, improve coordination and increase proprioception to improve the patients ability to increase activity tolerance       Billed With/As:   [] TE   [] TA   [] Neuro   [] Self Care Patient Education: [x] Review HEP    [] Progressed/Changed HEP based on:   [] positioning   [] body mechanics   [] transfers   [] heat/ice application    [] other:      Other Objective/Functional Measures: Added 4 inch lateral step and SAQ today     Post Treatment Pain Level (on 0 to 10) scale:   2  / 10     ASSESSMENT  Assessment/Changes in Function:     Pt able to achieve full knee extension, but required stretching. []  See Progress Note/Recertification   Patient will continue to benefit from skilled PT services to modify and progress therapeutic interventions, address functional mobility deficits, address ROM deficits, address strength deficits, analyze and address soft tissue restrictions, analyze and cue movement patterns, analyze and modify body mechanics/ergonomics, assess and modify postural abnormalities and address imbalance/dizziness to attain remaining goals. Progress toward goals / Updated goals:    Making good progress toward ROM and strength goals.      PLAN  [x]  Upgrade activities as tolerated YES Continue plan of care   []  Discharge due to :    []  Other:      Therapist: Brittaney Graf PT    Date: 3/18/2020 Time: 9:10 AM     Future Appointments   Date Time Provider Brice Bush   3/18/2020  4:30 PM Melissa Velazco PT [facility]   3/20/2020  9:30 AM Lo CARRENO [facility]   3/23/2020  3:30 PM Melissa Velazco, PT [facility]   3/25/2020  1:30 PM Melissa Velazco PT [facility]   3/27/2020  9:00 AM Vaughn Still [facility]   3/30/2020  3:30 PM Melissa Velazco PT [facility]   4/1/2020  4:00 PM Melissa Velazco PT [facility]   4/3/2020  9:30 AM Vaughn Still [facility]

## 2020-03-20 ENCOUNTER — HOSPITAL ENCOUNTER (OUTPATIENT)
Dept: PHYSICAL THERAPY | Age: 60
Discharge: HOME | End: 2020-03-20
Payer: COMMERCIAL

## 2020-03-20 PROCEDURE — 97110 THERAPEUTIC EXERCISES: CPT

## 2020-03-20 NOTE — PROGRESS NOTES
PHYSICAL THERAPY - DAILY TREATMENT NOTE    Patient Name: Walda Hatchet        Date: 3/20/2020  : 1960   YES Patient  Verified  Visit #:     Insurance: Payor: ELZBIETA Melton / Plan: [facility] [employer] [address] [facility] / Product Type: Commerical /      In time: 930 Out time: 1030   Total Treatment Time: 60     BCBS/Medicare Time Tracking (below)   Total Timed Codes (min):  NA 1:1 Treatment Time:  NA     TREATMENT AREA =  Left knee pain [M25.562]    SUBJECTIVE  Pain Level (on 0 to 10 scale):  2  / 10   Medication Changes/New allergies or changes in medical history, any new surgeries or procedures? NO    If yes, update Summary List   Subjective Functional Status/Changes:  []  No changes reported     Patient reports he was able to stretch a little bit this morning. Modalities Rationale:     decrease inflammation and decrease pain to improve patient's ability to perform pain free ADLs    min [] Estim, type/location:                                      []  att     []  unatt     []  w/US     []  w/ice    []  w/heat    min []  Mechanical Traction: type/lbs                   []  pro   []  sup   []  int   []  cont    []  before manual    []  after manual    min []  Ultrasound, settings/location:      min []  Iontophoresis w/ dexamethasone, location:                                               []  take home patch       []  in clinic   10 min [x]  Ice     []  Heat    location/position: Left knee in supine    min []  Vasopneumatic Device, press/temp:     min []  Other:    [x] Skin assessment post-treatment (if applicable):    [x]  intact    [x]  redness- no adverse reaction     []redness - adverse reaction:        50/40 min Therapeutic Exercise:  [x]  See flow sheet   Rationale:      increase ROM, increase strength, improve coordination, improve balance and increase proprioception to improve the patients ability to perform unlimited ADLs.      Billed With/As:   [] TE   [] NICKIE [] Neuro   [] Self Care Patient Education: [x] Review HEP    [] Progressed/Changed HEP based on:   [] positioning   [] body mechanics   [] transfers   [] heat/ice application    [] other:      Other Objective/Functional Measures: Added standing heel raises. Cues for full knee extension and improved WS onto LLE during gait      Post Treatment Pain Level (on 0 to 10) scale:   2  / 10     ASSESSMENT  Assessment/Changes in Function:     Patient is making good progress with PT rx but does require frequent cuing throughout exercises for good form. []  See Progress Note/Recertification   Patient will continue to benefit from skilled PT services to modify and progress therapeutic interventions, address functional mobility deficits, address ROM deficits, address strength deficits, analyze and address soft tissue restrictions, analyze and cue movement patterns, analyze and modify body mechanics/ergonomics, assess and modify postural abnormalities, address imbalance/dizziness and instruct in home and community integration to attain remaining goals. Progress toward goals / Updated goals:    Progressing towards LTG 2.       PLAN  [x]  Upgrade activities as tolerated YES Continue plan of care   []  Discharge due to :    []  Other:      Therapist: Kelsey Quintero    Date: 3/20/2020 Time: 12:21 PM     Future Appointments   Date Time Provider Brice Bush   3/23/2020  3:30 PM [facility]   3/25/2020  1:30 PM Bala Ellis PT [facility]   3/27/2020  9:00 AM Raza [facility]   3/30/2020  3:30 PM Bala Ellis PT [facility]   4/1/2020  4:00 PM Bala Ellis PT [facility]   4/3/2020  9:30 AM Raza [facility]

## 2020-03-23 ENCOUNTER — HOSPITAL ENCOUNTER (OUTPATIENT)
Dept: PHYSICAL THERAPY | Age: 60
Discharge: HOME | End: 2020-03-23
Payer: COMMERCIAL

## 2020-03-23 ENCOUNTER — APPOINTMENT (OUTPATIENT)
Dept: PHYSICAL THERAPY | Age: 60
End: 2020-03-23
Payer: COMMERCIAL

## 2020-03-23 PROCEDURE — 97110 THERAPEUTIC EXERCISES: CPT | Performed by: PHYSICAL THERAPIST

## 2020-03-23 NOTE — PROGRESS NOTES
PHYSICAL THERAPY - DAILY TREATMENT NOTE    Patient Name: Екатерина Dominique        Date: 3/23/2020  : 1960   YES Patient  Verified  Visit #:     Insurance: Payor: ELZBIETA Melton / Plan: [facility] [employer]ICAL [employer] [address] [facility] / Product Type: Commerical /      In time: 3:25 Out time: 4:15   Total Treatment Time: 50     BCBS/Medicare Time Tracking (below)   Total Timed Codes (min):  na 1:1 Treatment Time:  na     TREATMENT AREA =  Left knee pain [M25.562]    SUBJECTIVE  Pain Level (on 0 to 10 scale):  2  / 10   Medication Changes/New allergies or changes in medical history, any new surgeries or procedures? NO    If yes, update Summary List   Subjective Functional Status/Changes:  []  No changes reported     Pt reports doing exercises at home daily. He continues to wear compression stocking and use compression wrap when leaving home for extended periods.           Modalities Rationale:     decrease edema, decrease inflammation and decrease pain to improve patient's ability to prevent soreness   min [] Estim, type/location:                                      []  att     []  unatt     []  w/US     []  w/ice    []  w/heat    min []  Mechanical Traction: type/lbs                   []  pro   []  sup   []  int   []  cont    []  before manual    []  after manual    min []  Ultrasound, settings/location:      min []  Iontophoresis w/ dexamethasone, location:                                               []  take home patch       []  in clinic   10 min [x]  Ice     []  Heat    location/position: L Knee - supine after treatment    min []  Vasopneumatic Device, press/temp:     min []  Other:    [x] Skin assessment post-treatment (if applicable):    [x]  intact    [x]  redness- no adverse reaction     []redness - adverse reaction:        40 min Therapeutic Exercise:  [x]  See flow sheet   Rationale:      increase ROM, increase strength, improve coordination and increase proprioception to improve the patients ability to increase activity tolerance       Billed With/As:   [] TE   [] TA   [] Neuro   [] Self Care Patient Education: [x] Review HEP    [] Progressed/Changed HEP based on:   [] positioning   [] body mechanics   [] transfers   [] heat/ice application    [] other:      Other Objective/Functional Measures:    Achieved 118 degrees of flexion without using strap today     Post Treatment Pain Level (on 0 to 10) scale:   2  / 10     ASSESSMENT  Assessment/Changes in Function:     Pt showing improved tolerance to all exercises. Takes some stretching to attain full knee extension. []  See Progress Note/Recertification   Patient will continue to benefit from skilled PT services to modify and progress therapeutic interventions, address functional mobility deficits, address ROM deficits, address strength deficits, analyze and address soft tissue restrictions, analyze and cue movement patterns, analyze and modify body mechanics/ergonomics, assess and modify postural abnormalities and address imbalance/dizziness to attain remaining goals. Progress toward goals / Updated goals: Will decrease pt to QW per social distancing guidelines during the current COVID-19 pandemic [facility] reccommendations.       PLAN  [x]  Upgrade activities as tolerated YES Continue plan of care   []  Discharge due to :    []  Other:      Therapist: Nelly Vega, PT    Date: 3/23/2020 Time: 9:10 AM     Future Appointments   Date Time Provider Brice Bush   3/23/2020  3:30 PM Dilma Okeefe [facility]   3/25/2020  1:30 PM Jamil Canales, PT [facility]   3/27/2020  9:00 AM Lance Zepeda [facility]   3/30/2020  3:30 PM Jamil Canales PT [facility]   4/1/2020  4:00 PM Jamil Canales PT [facility]   4/3/2020  9:30 AM Lance Zepeda [facility]

## 2020-03-25 ENCOUNTER — APPOINTMENT (OUTPATIENT)
Dept: PHYSICAL THERAPY | Age: 60
End: 2020-03-25
Payer: COMMERCIAL

## 2020-03-27 ENCOUNTER — APPOINTMENT (OUTPATIENT)
Dept: PHYSICAL THERAPY | Age: 60
End: 2020-03-27
Payer: COMMERCIAL

## 2020-03-30 ENCOUNTER — HOSPITAL ENCOUNTER (OUTPATIENT)
Dept: PHYSICAL THERAPY | Age: 60
Discharge: HOME | End: 2020-03-30
Payer: COMMERCIAL

## 2020-03-30 ENCOUNTER — APPOINTMENT (OUTPATIENT)
Dept: PHYSICAL THERAPY | Age: 60
End: 2020-03-30
Payer: COMMERCIAL

## 2020-03-30 PROCEDURE — 97110 THERAPEUTIC EXERCISES: CPT | Performed by: PHYSICAL THERAPIST

## 2020-03-30 NOTE — PROGRESS NOTES
PHYSICAL THERAPY - DAILY TREATMENT NOTE    Patient Name: Braeden Ospina        Date: 3/30/2020  : 1960   YES Patient  Verified  Visit #:     Insurance: Payor: [employer]ICAL [employer] [facility] / Plan: [facility] MEDICAL M[employer]UAL [address] [facility] / Product Type: Commerical /      In time: 9:00 Out time: 10:10   Total Treatment Time: 70     BCBS/Medicare Time Tracking (below)   Total Timed Codes (min):  na 1:1 Treatment Time:  na     TREATMENT AREA =  Left knee pain [M25.562]    SUBJECTIVE  Pain Level (on 0 to 10 scale):  1-2  / 10   Medication Changes/New allergies or changes in medical history, any new surgeries or procedures? NO    If yes, update Summary List   Subjective Functional Status/Changes:  []  No changes reported     Pt reports doing [place] amazing amount\" of house/yard work this weekend, and lost his cane for a day.           Modalities Rationale:     decrease edema, decrease inflammation and decrease pain to improve patient's ability to prevent soreness   min [] Estim, type/location:                                      []  att     []  unatt     []  w/US     []  w/ice    []  w/heat    min []  Mechanical Traction: type/lbs                   []  pro   []  sup   []  int   []  cont    []  before manual    []  after manual    min []  Ultrasound, settings/location:      min []  Iontophoresis w/ dexamethasone, location:                                               []  take home patch       []  in clinic   10 min [x]  Ice     []  Heat    location/position: L Knee - supine after treatment    min []  Vasopneumatic Device, press/temp:     min []  Other:    [x] Skin assessment post-treatment (if applicable):    [x]  intact    [x]  redness- no adverse reaction     []redness - adverse reaction:        60 min Therapeutic Exercise:  [x]  See flow sheet   Rationale:      increase ROM, increase strength, improve coordination and increase proprioception to improve the patients ability to increase activity tolerance Billed With/As:   [] TE   [] TA   [] Neuro   [] Self Care Patient Education: [x] Review HEP    [] Progressed/Changed HEP based on:   [] positioning   [] body mechanics   [] transfers   [] heat/ice application    [] other:      Other Objective/Functional Measures:    Pt walked into clinic lacking last bit of right knee extension    Added retro walk on TM today     Post Treatment Pain Level (on 0 to 10) scale:   2  / 10     ASSESSMENT  Assessment/Changes in Function:     Able to do all exercises and noted having increased extension after retrp walk on treadmill. Pt to focus on terminal knee extension flexibility, then with quad set each morning (and throughout day) to improve gait. []  See Progress Note/Recertification   Patient will continue to benefit from skilled PT services to modify and progress therapeutic interventions, address functional mobility deficits, address ROM deficits, address strength deficits, analyze and address soft tissue restrictions, analyze and cue movement patterns, analyze and modify body mechanics/ergonomics, assess and modify postural abnormalities and address imbalance/dizziness to attain remaining goals. Progress toward goals / Updated goals:    Making good progress with functional strength/endurance.      PLAN  [x]  Upgrade activities as tolerated YES Continue plan of care   []  Discharge due to :    []  Other:      Therapist: Raul Zelaya, PT    Date: 3/30/2020 Time: 9:10 AM     Future Appointments   Date Time Provider Brice u[facility]   3/30/2020  9:00 AM Kim Lerma, PT [facility]

## 2020-04-01 ENCOUNTER — APPOINTMENT (OUTPATIENT)
Dept: PHYSICAL THERAPY | Age: 60
End: 2020-04-01
Payer: COMMERCIAL

## 2020-04-03 ENCOUNTER — APPOINTMENT (OUTPATIENT)
Dept: PHYSICAL THERAPY | Age: 60
End: 2020-04-03
Payer: COMMERCIAL

## 2020-04-06 ENCOUNTER — HOSPITAL ENCOUNTER (OUTPATIENT)
Dept: PHYSICAL THERAPY | Age: 60
Discharge: HOME | End: 2020-04-06
Payer: COMMERCIAL

## 2020-04-06 PROCEDURE — 97110 THERAPEUTIC EXERCISES: CPT | Performed by: PHYSICAL THERAPIST

## 2020-04-06 NOTE — PROGRESS NOTES
PHYSICAL THERAPY - DAILY TREATMENT NOTE    Patient Name: Moon Peralta        Date: 2020  : 1960   YES Patient  Verified  Visit #:   10 of   18  Insurance: Payor: MEDICAL MUTUAL [facility] / Plan: [facility] MEDICAL [employer] [address] / Product Type: Commerical /      In time: 9:00 Out time: 10:05   Total Treatment Time: 65     BCBS/Medicare Time Tracking (below)   Total Timed Codes (min):  na 1:1 Treatment Time:  na     TREATMENT AREA =  Left knee pain [M25.562]    SUBJECTIVE  Pain Level (on 0 to 10 scale):  2  / 10   Medication Changes/New allergies or changes in medical history, any new surgeries or procedures? NO    If yes, update Summary List   Subjective Functional Status/Changes:  []  No changes reported     Pt reports not using SPC much at home and has really been working on getting knee straight and doing stairs at home.           Modalities Rationale:     decrease edema, decrease inflammation and decrease pain to improve patient's ability to prevent soreness   min [] Estim, type/location:                                      []  att     []  unatt     []  w/US     []  w/ice    []  w/heat    min []  Mechanical Traction: type/lbs                   []  pro   []  sup   []  int   []  cont    []  before manual    []  after manual    min []  Ultrasound, settings/location:      min []  Iontophoresis w/ dexamethasone, location:                                               []  take home patch       []  in clinic   10 min [x]  Ice     []  Heat    location/position: L Knee - supine after treatment    min []  Vasopneumatic Device, press/temp:     min []  Other:    [x] Skin assessment post-treatment (if applicable):    [x]  intact    [x]  redness- no adverse reaction     []redness - adverse reaction:        55 min Therapeutic Exercise:  [x]  See flow sheet   Rationale:      increase ROM, increase strength, improve coordination and increase proprioception to improve the patients ability to increase activity tolerance       Billed With/As:   [] TE   [] TA   [] Neuro   [] Self Care Patient Education: [x] Review HEP    [] Progressed/Changed HEP based on:   [] positioning   [] body mechanics   [] transfers   [] heat/ice application    [] other:      Other Objective/Functional Measures:    + ballotable patella    Achieved full knee extension today, and 115 degrees of flexion    FOTO - 41       Post Treatment Pain Level (on 0 to 10) scale:   2  / 10     ASSESSMENT  Assessment/Changes in Function:     Pt is progressing well with regaining normal extension of L knee. [x]  See Progress Note   Patient will continue to benefit from skilled PT services to modify and progress therapeutic interventions, address functional mobility deficits, address ROM deficits, address strength deficits, analyze and address soft tissue restrictions, analyze and cue movement patterns, analyze and modify body mechanics/ergonomics, assess and modify postural abnormalities and address imbalance/dizziness to attain remaining goals. Progress toward goals / Updated goals:    Making gradual progress with ROM and strength. PLAN  [x]  Upgrade activities as tolerated YES Continue plan of care   []  Discharge due to :    []  Other:      Therapist: Edgar Berkowitz PT    Date: 4/6/2020 Time: 9:10 AM     No future appointments.

## 2020-04-06 NOTE — PROGRESS NOTES
[facility] [address] [place] [address]  Jarek Keenan Plass 09, [address] [place] [address], [address]  Phone: [phone]  Fax: [phone]  PROGRESS NOTE  Patient Name: Angeles Cedillo : 1960   Treatment/Medical Diagnosis: S/p L TKA - 20   Referral Source: Jair Fulton MD     Date of Initial Visit: 3/5/20 Attended Visits: 10 Missed Visits: 0     SUMMARY OF TREATMENT  L knee flexibility exercises, strengthening/endurance, gait training, and modalities for symptom/swelling relief. CURRENT STATUS  Pt was last seen on 20, and reported pain of 2/10. He is walking w SPC, but notes sometimes wakking without an AD at home. His PROM with self - stretching is full extension and 115 degrees of flexion. Pt still has mild-moderate swelling at the L knee joint. He is doing SLR w 2lb weight without a lag, and has been tolerating gradual progression of step up heights. Descending stairs remains difficult. Goal/Measure of Progress Goal Met? 1.  Pt independent with basic extension biased flexibility and strengthening. Status at last Eval: - Current Status: Met yes   2. Pt to attain full L knee extension to allow walking without an extension lag. Status at last Eval: -4 degrees Current Status: Full Extension yes   3. Pt able to perform 10 SLR without a lag to allow improved gait mechanics. Status at last Eval: Unabel Current Status: Met w 2lb weight yes   4. Pt to increase FOTO score from 30 to >/= 57. Status at last Eval: 30 Current Status: 41 Progressing     New Goals to be achieved in __6__  weeks:  1. Pt to increase FOTO score from 41 to >/= 57.   2.  Pt independent with high level HEP for L knee flexibility, strengthening/endurance, and proprioception. 3.  Pt to achieve 0-120 degrees of ROM to allow painfree ADLs. 4.  Pt able to go up/down a flight of stairs with reciprocal steps without any knee pain.        RECOMMENDATIONS  Continue PT 1-2 times/week for an additional 4-6 weeks to achieve LTGs. If you have any questions/comments please contact us directly at [phone]. Thank you for allowing us to assist in the care of your patient. Therapist Signature: Maggie Hoffman PT Date: 4/6/2020   Reporting Period: NA Time: 11:34 AM   NOTE TO PHYSICIAN:  PLEASE COMPLETE THE ORDERS BELOW AND FAX TO   [facility]: [phone]. If you are unable to process this request in 24 hours please contact our office: [phone].    ___ I have read the above report and request that my patient continue as recommended.   ___ I have read the above report and request that my patient continue therapy with the following changes/special instructions:_________________________________________________________   ___ I have read the above report and request that my patient be discharged from therapy.      Physician Signature:        Date:       Time:

## 2020-04-13 ENCOUNTER — HOSPITAL ENCOUNTER (OUTPATIENT)
Dept: PHYSICAL THERAPY | Age: 60
Discharge: HOME | End: 2020-04-13
Payer: COMMERCIAL

## 2020-04-13 PROCEDURE — 97110 THERAPEUTIC EXERCISES: CPT | Performed by: PHYSICAL THERAPIST

## 2020-04-13 NOTE — PROGRESS NOTES
PHYSICAL THERAPY - DAILY TREATMENT NOTE    Patient Name: Scarlett         Date: 2020  : 1960   YES Patient  Verified  Visit #:     Insurance: Payor: [employer] [facility] / Plan: [facility] [employer] [address] / Product Type: Commerical /      In time: 9:00 Out time: 10:05   Total Treatment Time: 65     BCBS/Medicare Time Tracking (below)   Total Timed Codes (min):  na 1:1 Treatment Time:  na     TREATMENT AREA =  Left knee pain [M25.562]    SUBJECTIVE  Pain Level (on 0 to 10 scale):  2  / 10   Medication Changes/New allergies or changes in medical history, any new surgeries or procedures? NO    If yes, update Summary List   Subjective Functional Status/Changes:  []  No changes reported     Pt reports cleaning the garage this weekend and did not have much soreness/swelling. Walking more without SPC.           Modalities Rationale:     decrease edema, decrease inflammation and decrease pain to improve patient's ability to prevent soreness   min [] Estim, type/location:                                      []  att     []  unatt     []  w/US     []  w/ice    []  w/heat    min []  Mechanical Traction: type/lbs                   []  pro   []  sup   []  int   []  cont    []  before manual    []  after manual    min []  Ultrasound, settings/location:      min []  Iontophoresis w/ dexamethasone, location:                                               []  take home patch       []  in clinic   10 min [x]  Ice     []  Heat    location/position: L Knee - supine after treatment    min []  Vasopneumatic Device, press/temp:     min []  Other:    [x] Skin assessment post-treatment (if applicable):    [x]  intact    [x]  redness- no adverse reaction     []redness - adverse reaction:        55 min Therapeutic Exercise:  [x]  See flow sheet   Rationale:      increase ROM, increase strength, improve coordination and increase proprioception to improve the patients ability to increase activity tolerance       Billed With/As:   [] TE   [] TA   [] Neuro   [] Self Care Patient Education: [x] Review HEP    [] Progressed/Changed HEP based on:   [] positioning   [] body mechanics   [] transfers   [] heat/ice application    [] other:      Other Objective/Functional Measures:    Achieve full extension and focused on trying to get full extension with quad set    Significant improvement in gait mechanics after retro walking on treadmill - able to normalize heel strike and equalize stance time   Post Treatment Pain Level (on 0 to 10) scale:   2  / 10     ASSESSMENT  Assessment/Changes in Function:     Less swelling noted today and he could achieve passive and active extension more easily today. []  See Progress Note   Patient will continue to benefit from skilled PT services to modify and progress therapeutic interventions, address functional mobility deficits, address ROM deficits, address strength deficits, analyze and address soft tissue restrictions, analyze and cue movement patterns, analyze and modify body mechanics/ergonomics, assess and modify postural abnormalities and address imbalance/dizziness to attain remaining goals. Progress toward goals / Updated goals:    Making gradual progress on restoring normal ROM and functional strength/gait mechanics.        PLAN  [x]  Upgrade activities as tolerated YES Continue plan of care   []  Discharge due to :    []  Other:      Therapist: Silas Schrader PT    Date: 4/13/2020 Time: 9:10 AM     Future Appointments   Date Time Provider Brice Bush   4/13/2020  9:00 AM Elham Hernandez, PT [facility]   4/14/2020  9:15 AM Shelton Chappell  E Norm[place]    4/20/2020  9:00 AM Elham Hernandez, PT [facility]   4/27/2020  9:00 AM Timmy Wise, PT [facility]

## 2020-04-14 ENCOUNTER — VIRTUAL VISIT (OUTPATIENT)
Dept: NEUROLOGY | Age: 60
End: 2020-04-14

## 2020-04-14 VITALS — BODY MASS INDEX: 37.47 KG/M2 | HEIGHT: 69 IN | WEIGHT: 253 LBS

## 2020-04-14 DIAGNOSIS — G89.29 CHRONIC KNEE PAIN, UNSPECIFIED LATERALITY: ICD-10-CM

## 2020-04-14 DIAGNOSIS — M25.569 CHRONIC KNEE PAIN, UNSPECIFIED LATERALITY: ICD-10-CM

## 2020-04-14 DIAGNOSIS — E66.01 MORBID OBESITY (HCC): ICD-10-CM

## 2020-04-14 DIAGNOSIS — G47.33 OSA (OBSTRUCTIVE SLEEP APNEA): Primary | ICD-10-CM

## 2020-04-14 RX ORDER — HYDROCODONE BITARTRATE AND ACETAMINOPHEN 5; 325 MG/1; MG/1
TABLET ORAL
COMMUNITY

## 2020-04-14 NOTE — PROGRESS NOTES
Chief Complaint   Patient presents with    Sleep Problem     follow up sleep study       Malina Gomez presents today for   Chief Complaint   Patient presents with    Sleep Problem     follow up sleep study       Is someone accompanying this pt? Virtual Visit [phone]    Is the patient using any DME equipment during OV? no    Depression Screening:  3 most recent PHQ Screens 11/7/2019   Little interest or pleasure in doing things Not at all   Feeling down, depressed, irritable, or hopeless Not at all   Total Score PHQ 2 0       Learning Assessment:  No flowsheet data found. Abuse Screening:  No flowsheet data found. Fall Risk  No flowsheet data found. Coordination of Care:  1. Have you been to the ER, urgent care clinic since your last visit? Hospitalized since your last visit? no    2. Have you seen or consulted any other health care providers outside of the [address] [place] [address] since your last visit? Include any pap smears or colon screening.  no

## 2020-04-14 NOTE — PATIENT INSTRUCTIONS
Thank you for choosing [address] [place] [address] and [address] [place] [address] Neurology Clinic for your  
 
care. You may receive a survey about your visit. We appreciate you taking time  
 
to complete this survey as we use your feedback to improve our services. We  
 
realize we are not perfect, but we strive to provide excellent care.

## 2020-04-14 NOTE — PROGRESS NOTES
4/14/2020 9:44 AM    SSN: [id number]      HPI:  42-year-old male who I saw November 7 for evaluation of snoring, obesity, and a history of obstructive sleep apnea initially diagnosed about 10 years ago. He came with reports that he had gained some weight and that he, but for many years he had not been using his CPAP machine which had been issued about 10 years ago. Initially he had tried it for about a month, he felt that he was getting strangled, and quit. He had a repeat attended sleep study on January 9 which showed moderate obstructive sleep apnea with an AHI of 15, REM AHI of 14, desaturations to a minimum of 77% after total sleep time of 6.6 hours. He did try CPAP with his old machine and tolerated it well for a couple of weeks. He had knee surgery February 19 and he has been recovering, currently he is getting about 2 to 3 hours of sleep at a time in his 2-3 blocks per day, this is improving daily, so he is concerned that his amount of sleep and his sleep continuity or lack of thereof could affect his CPAP compliance. Social History     Socioeconomic History    Marital status:      Spouse name: Not on file    Number of children: Not on file    Years of education: Not on file    Highest education level: Not on file   Occupational History    Not on file   Social Needs    Financial resource strain: Not on file    Food insecurity     Worry: Not on file     Inability: Not on file    Transportation needs     Medical: Not on file     Non-medical: Not on file   Tobacco Use    Smoking status: Never Smoker    Smokeless tobacco: Never Used   Substance and Sexual Activity    Alcohol use:  Yes     Alcohol/week: 1.7 standard drinks     Types: 2 Cans of beer per week    Drug use: No    Sexual activity: Yes   Lifestyle    Physical activity     Days per week: Not on file     Minutes per session: Not on file    Stress: Not on file   Relationships    Social connections Talks on phone: Not on file     Gets together: Not on file     Attends Gnosticism service: Not on file     Active member of club or organization: Not on file     Attends meetings of clubs or organizations: Not on file     Relationship status: Not on file    Intimate partner violence     Fear of current or ex partner: Not on file     Emotionally abused: Not on file     Physically abused: Not on file     Forced sexual activity: Not on file   Other Topics Concern    Not on file   Social History Narrative    Not on file       History reviewed. No pertinent family history. Current Outpatient Medications   Medication Sig Dispense Refill    HYDROcodone-acetaminophen (NORCO) 5-325 mg per tablet Take  by mouth.  docusate sodium (COLACE) 100 mg capsule Take 100 mg by mouth three (3) times daily as needed for Constipation.  HYDROmorphone (DILAUDID) 2 mg tablet Take 2 mg by mouth every six (6) hours as needed for Pain.  oxyCODONE IR (ROXICODONE) 5 mg immediate release tablet Take 5 mg by mouth every six (6) hours as needed for Pain.  aspirin 81 mg chewable tablet Take 1 Tab by mouth two (2) times a day.  43 Tab 0       Past Medical History:   Diagnosis Date    Arthritis     Asthma     exercise induced    Chronic pain     bilateral pain    Liver disease 1998    Hepatitis C-clear now    Osteoarthritis of right knee 12/14/2014    Pain due to knee joint prosthesis ([facility] 4/3/2017    Small bowel obstruction ([facility] 2012    resolved    Unspecified sleep apnea     No CPAP use       Past Surgical History:   Procedure Laterality Date    HX ADENOIDECTOMY      HX APPENDECTOMY  1992    HX KNEE ARTHROSCOPY Right 6-    x 2    HX KNEE ARTHROSCOPY Left     HX KNEE REPLACEMENT Right 02/2014    Revision    HX ROTATOR CUFF REPAIR Right     HX TONSILLECTOMY         No Known Allergies    Review of Systems:   GENERAL: No reported fever, positive fatigue  CARDIAC: No CP or SOB  PULMONARY: No cough of SOB reported  MUSCULOSKELETAL: Reports joint pain  NEURO: SEE HPI      Vital signs:    Visit Vitals  Ht 5' 9\" (1.753 m)   Wt 114.8 kg (253 lb)   BMI 37.36 kg/m²       HR-76   RR-18      EXAM: Alert, in NAD. Oriented to person, time, place, normal attention and concentration, no aphasia, EOM's are full, no facial asymmetries, hearing is present. No lateralizing weakness. gait deferred      Assessment/Plan: Moderate overall obstructive sleep apnea with symptoms of excessive daytime sleepiness and heavy disruptive snoring, previously intolerant to CPAP, currently seems much more motivated and I think chances test with new mask and machine are better. Discussed study results, CPAP pitfalls, GISSELLE treatment options, weight management. I will start him on Auto-CPAP at 5-15 cms H2O with heated in-line humidification and supplies, DME to size for mask. Regarding compliance concerns, he is improving his sleep length on a daily basis and by the time he gets his machine and supplies hopefully he will be able to put at least 4 hours at night, if not he will play by ear and delayed by a couple of weeks as necessary. Preliminarily I will see him in 6 weeks with a CPAP download. PLEASE NOTE:   Portions of this document may have been produced using voice recognition software. Unrecognized errors in transcription may be present. This note will not be viewable in [address] Johnson Quinn is a 64 y.o. male who was seen by synchronous (real-time) audio-video technology on 4/14/2020. Consent:  She and/or her healthcare decision maker is aware that this patient-initiated Telehealth encounter is a billable service, with coverage as determined by her insurance carrier. She is aware that she may receive a bill and has provided verbal consent to proceed: Yes    I was in the office while conducting this encounter. Patient is location was at her home.     I spent 25 minutes with this established patient, and 15 minutes of the time was spent counseling and/or coordinating care regarding the aforementioned issues as stated above. Pursuant to the emergency declaration under the [facility] waiver authority and the [employer] Act, this Virtual  Visit was conducted, with patient's consent, to reduce the patient's risk of exposure to COVID-19 and provide continuity of care for an established patient. Services were provided through a video synchronous discussion virtually to substitute for in-person clinic visit.     Jade Rose MD

## 2020-04-20 ENCOUNTER — HOSPITAL ENCOUNTER (OUTPATIENT)
Dept: PHYSICAL THERAPY | Age: 60
Discharge: HOME | End: 2020-04-20
Payer: COMMERCIAL

## 2020-04-20 PROCEDURE — 97110 THERAPEUTIC EXERCISES: CPT

## 2020-04-20 NOTE — PROGRESS NOTES
PHYSICAL THERAPY - DAILY TREATMENT NOTE    Patient Name: Jourdan Alvarado        Date: 2020  : 1960   YES Patient  Verified  Visit #:     Insurance: Payor: [employer] [facility] / Plan: [facility] [employer] [address] / Product Type: Commerical /       In time: 8:00 A Out time: 9:05 A   Total Treatment Time: 60     BCBS/Medicare Time Tracking (below)   Total Timed Codes (min):  NA 1:1 Treatment Time:  NA     TREATMENT AREA = Left knee pain [M25.562]    SUBJECTIVE  Pain Level (on 0 to 10 scale):  3  / 10   Medication Changes/New allergies or changes in medical history, any new surgeries or procedures? NO    If yes, update Summary List   Subjective Functional Status/Changes:  []  No changes reported     Patient reports that his has little more pain today, probably due to the change in weather, he is pretty much walking without the cane around the house.           Modalities Rationale:     decrease edema, decrease inflammation and decrease pain to improve patient's ability to return to pain-free standing    min [] Estim, type/location:                                      []  att     []  unatt     []  w/US     []  w/ice    []  w/heat    min []  Mechanical Traction: type/lbs                   []  pro   []  sup   []  int   []  cont    []  before manual    []  after manual    min []  Ultrasound, settings/location:      min []  Iontophoresis w/ dexamethasone, location:                                               []  take home patch       []  in clinic   10 min [x]  Ice     []  Heat    location/position: Supine to L knee    min []  Vasopneumatic Device, press/temp:     min []  Other:    [] Skin assessment post-treatment (if applicable):    [x]  intact    []  redness- no adverse reaction     []redness - adverse reaction:        50 min Therapeutic Exercise:  [x]  See flow sheet   Rationale:      increase ROM and increase strength to improve the patients ability to return to pain-free ambulation      Billed With/As:   [] TE   [] TA   [] Neuro   [] Self Care Patient Education: [x] Review HEP    [] Progressed/Changed HEP based on:   [] positioning   [] body mechanics   [] transfers   [] heat/ice application    [] other:      Other Objective/Functional Measures: Added forward step down off of 4 inch step      Post Treatment Pain Level (on 0 to 10) scale:   0  / 10     ASSESSMENT  Assessment/Changes in Function:     Good tolerance to step down progressions today      []  See Progress Note/Recertification   Patient will continue to benefit from skilled PT services to modify and progress therapeutic interventions, address functional mobility deficits, address ROM deficits, address strength deficits, analyze and modify body mechanics/ergonomics, assess and modify postural abnormalities and instruct in home and community integration to attain remaining goals.    Progress toward goals / Updated goals:    Progressing towards LTG 3      PLAN  []  Upgrade activities as tolerated YES Continue plan of care   []  Discharge due to :    []  Other:      Therapist: Concetta Crigler, PT    Date: 4/20/2020 Time: 11:05 AM     Future Appointments   Date Time Provider Brice Bush   4/27/2020  8:00 AM Brandi Queen, PT [facility]

## 2020-04-27 ENCOUNTER — HOSPITAL ENCOUNTER (OUTPATIENT)
Dept: PHYSICAL THERAPY | Age: 60
Discharge: HOME | End: 2020-04-27
Payer: COMMERCIAL

## 2020-04-27 PROCEDURE — 97110 THERAPEUTIC EXERCISES: CPT

## 2020-04-27 NOTE — PROGRESS NOTES
PHYSICAL THERAPY - DAILY TREATMENT NOTE    Patient Name: Jakob Bennett        Date: 2020  : 1960   YES Patient  Verified   Visit #:   15   of   18  Insurance: Payor: [employer] [facility] / Plan: [facility] [employer]ICAL [employer] [address] [facility] / Product Type: Commerical /      In time: 8:00 A Out time: 9:05 A   Total Treatment Time: 65     BCBS/Medicare Time Tracking (below)   Total Timed Codes (min):  55 1:1 Treatment Time:  55     TREATMENT AREA = Left knee pain [M25.562]    SUBJECTIVE  Pain Level (on 0 to 10 scale):  2  / 10   Medication Changes/New allergies or changes in medical history, any new surgeries or procedures? NO    If yes, update Summary List   Subjective Functional Status/Changes:  []  No changes reported      Patient reports average pain level 2/10, 5-6/10 with prolonged weightbearing or with prolonged, he reports decreased c/o pain with avoiding static positioning. He is taking either naproxen or ibuprofen prn throughout the day & 1 oxycodone at night to sleep. He is still having a hard time sleeping, prefers sleeping in R S/L with 1 pillow. He is walking with SPC outside and is almost completely off the cane at home. He is eager to return to golf.           Modalities Rationale:     decrease edema, decrease inflammation and decrease pain to improve patient's ability to return to pain-free ADLs    min [] Estim, type/location:                                      []  att     []  unatt     []  w/US     []  w/ice    []  w/heat    min []  Mechanical Traction: type/lbs                   []  pro   []  sup   []  int   []  cont    []  before manual    []  after manual    min []  Ultrasound, settings/location:      min []  Iontophoresis w/ dexamethasone, location:                                               []  take home patch       []  in clinic   10 min [x]  Ice     []  Heat    location/position: Supine to L knee     min []  Vasopneumatic Device, press/temp:     min []  Other:    [] Skin assessment post-treatment (if applicable):    [x]  intact    [x]  redness- no adverse reaction     []redness - adverse reaction:        55 min Therapeutic Exercise:  [x]  See flow sheet   Rationale:      increase ROM and increase strength to improve the patients ability to return pain-free standing    Billed With/As:   [] TE   [] TA   [] Neuro   [] Self Care Patient Education: [x] Review HEP    [] Progressed/Changed HEP based on:   [] positioning   [] body mechanics   [] transfers   [] heat/ice application    [] other:      Other Objective/Functional Measures:     Progressed to blue with TKE  L SLS at 30 secs     Post Treatment Pain Level (on 0 to 10) scale:   2 / 10     ASSESSMENT  Assessment/Changes in Function:     Good tolerance to strength and balance progressions today     []  See Progress Note/Recertification   Patient will continue to benefit from skilled PT services to modify and progress therapeutic interventions, address functional mobility deficits, address ROM deficits, address strength deficits, analyze and modify body mechanics/ergonomics, assess and modify postural abnormalities, address imbalance/dizziness and instruct in home and community integration to attain remaining goals. Progress toward goals / Updated goals:    Progressing towards LTG 3      PLAN  []  Upgrade activities as tolerated YES Continue plan of care   []  Discharge due to :    []  Other:      Therapist: Christelle Montoya PT    Date: 4/27/2020 Time: 8:20 AM     No future appointments.

## 2020-05-04 ENCOUNTER — HOSPITAL ENCOUNTER (OUTPATIENT)
Dept: PHYSICAL THERAPY | Age: 60
Discharge: HOME | End: 2020-05-04
Payer: COMMERCIAL

## 2020-05-04 PROCEDURE — 97110 THERAPEUTIC EXERCISES: CPT | Performed by: PHYSICAL THERAPIST

## 2020-05-04 NOTE — PROGRESS NOTES
[facility] AT [address] [place], [place] [address]melisa [facility], [address] [place] [address], [address]  Phone: [phone]  Fax: [phone]  PROGRESS NOTE  Patient Name: Candace Clark : 1960   Treatment/Medical Diagnosis: S/p L TKA - 20   Referral Source: Pam Marin MD     Date of Initial Visit: 3/5/20 Attended Visits: 14 Missed Visits: 0     SUMMARY OF TREATMENT  L knee flexibility exercises, strengthening/endurance, gait training, and modalities for symptom/swelling relief. CURRENT STATUS  Pt was last seen on 20, and reported pain of 2/10. He is walking without an AD in/out of home, but he has misplaced his SPC. His PROM with self - stretching is full extension and 117 degrees of flexion. Pt still has mild swelling at the L knee joint. Descending stairs remains difficult, but is is progressing well with regaining form in clinic on smaller step (4 inch). Goal/Measure of Progress Goal Met? 1.  Pt to achieve 0-120 degrees of ROM to allow painfree ADLs. Status at last Eval: 0-115 Current Status: 0-117 Progressing   2. Pt able to go up/down a flight of stairs with reciprocal steps without any knee pain. Status at last Eval: - Current Status: Mostly one step at a time Progressing   3. Pt independent with high level HEP for L knee flexibility, strengthening/endurance, and proprioception. Status at last Eval: Unabel Current Status: Met w 2lb weight yes   4. Pt to increase FOTO score from 30 to >/= 57. Status at last Eval: 30 Current Status: 41 Progressing     New Goals to be achieved in __6__  weeks:  1. Pt to increase FOTO score from 41 to >/= 57.   2.  Pt independent with high level HEP for L knee flexibility, strengthening/endurance, and proprioception. 3.  Pt to achieve 0-120 degrees of ROM to allow painfree ADLs. 4.  Pt able to go up/down a flight of stairs with reciprocal steps without any knee pain.        RECOMMENDATIONS  Continue PT 1-2 times/week for an additional 4-6 weeks to achieve LTGs. If you have any questions/comments please contact us directly at [phone]. Thank you for allowing us to assist in the care of your patient. Therapist Signature: aRymond Lebron PT Date: 5/4/2020   Reporting Period: NA Time: 11:34 AM   NOTE TO PHYSICIAN:  PLEASE COMPLETE THE ORDERS BELOW AND FAX TO   [facility]: [phone]. If you are unable to process this request in 24 hours please contact our office: [phone].    ___ I have read the above report and request that my patient continue as recommended.   ___ I have read the above report and request that my patient continue therapy with the following changes/special instructions:_________________________________________________________   ___ I have read the above report and request that my patient be discharged from therapy.      Physician Signature:        Date:       Time:

## 2020-05-04 NOTE — PROGRESS NOTES
PHYSICAL THERAPY - DAILY TREATMENT NOTE    Patient Name: Rosibel Elizabeth        Date: 2020  : 1960   YES Patient  Verified  Visit #:     Insurance: Payor: [address] / Plan: [facility] MEDICAL [employer] [address] / Product Type: Commerical /      In time: 9:00 Out time: 10:05   Total Treatment Time: 65     BCBS/Medicare Time Tracking (below)   Total Timed Codes (min):  na 1:1 Treatment Time:  na     TREATMENT AREA =  Left knee pain [M25.562]    SUBJECTIVE  Pain Level (on 0 to 10 scale):  2  / 10   Medication Changes/New allergies or changes in medical history, any new surgeries or procedures? NO    If yes, update Summary List   Subjective Functional Status/Changes:  []  No changes reported     Pt reports stopping pain meds last week, but he is still taking NSAIDs daily. He has been riding a mountain bike for up to 1.5 miles.           Modalities Rationale:     decrease edema, decrease inflammation and decrease pain to improve patient's ability to prevent soreness   min [] Estim, type/location:                                      []  att     []  unatt     []  w/US     []  w/ice    []  w/heat    min []  Mechanical Traction: type/lbs                   []  pro   []  sup   []  int   []  cont    []  before manual    []  after manual    min []  Ultrasound, settings/location:      min []  Iontophoresis w/ dexamethasone, location:                                               []  take home patch       []  in clinic   10 min [x]  Ice     []  Heat    location/position: L Knee - supine after treatment    min []  Vasopneumatic Device, press/temp:     min []  Other:    [x] Skin assessment post-treatment (if applicable):    [x]  intact    [x]  redness- no adverse reaction     []redness - adverse reaction:        55 min Therapeutic Exercise:  [x]  See flow sheet   Rationale:      increase ROM, increase strength, improve coordination and increase proprioception to improve the patients ability to increase activity tolerance       Billed With/As:   [] TE   [] TA   [] Neuro   [] Self Care Patient Education: [x] Review HEP    [] Progressed/Changed HEP based on:   [] positioning   [] body mechanics   [] transfers   [] heat/ice application    [] other:      Other Objective/Functional Measures:    Restarted retro walk on treadmill    Achieved 117 degrees of L knee flexion at end of session     Post Treatment Pain Level (on 0 to 10) scale:   2  / 10     ASSESSMENT  Assessment/Changes in Function:     Pt requires some stretching to achieve full knee extension, and loses some flexibility into flexion after stretching. He is progressing well with strengthening/stairs, but requires cues and use of UEs to allow knee flexion with stepping down.     []  See Progress Note   Patient will continue to benefit from skilled PT services to modify and progress therapeutic interventions, address functional mobility deficits, address ROM deficits, address strength deficits, analyze and address soft tissue restrictions, analyze and cue movement patterns, analyze and modify body mechanics/ergonomics, assess and modify postural abnormalities and address imbalance/dizziness to attain remaining goals.    Progress toward goals / Updated goals:    See PN       PLAN  [x]  Upgrade activities as tolerated YES Continue plan of care   []  Discharge due to :    []  Other:      Therapist: Maggie Hoffman PT    Date: 5/4/2020 Time: 9:10 AM     Future Appointments   Date Time Provider Brice Bush   5/4/2020  9:00 Zachary Han, [place] [facility] [place]   5/11/2020  9:00 AM Nicole Valdes, PT [facility] [place]   5/18/2020  9:00 AM Meme Wise, PT [facility] [place]

## 2020-05-11 ENCOUNTER — HOSPITAL ENCOUNTER (OUTPATIENT)
Dept: PHYSICAL THERAPY | Age: 60
Discharge: HOME | End: 2020-05-11
Payer: COMMERCIAL

## 2020-05-11 PROCEDURE — 97110 THERAPEUTIC EXERCISES: CPT | Performed by: PHYSICAL THERAPIST

## 2020-05-11 NOTE — PROGRESS NOTES
PHYSICAL THERAPY - DAILY TREATMENT NOTE    Patient Name: Candis Ruelas        Date: 2020  : 1960   YES Patient  Verified  Visit #:   15 of   18  Insurance: Payor: MEDICAL MUTUAL [facility] / Plan: [facility] MEDICAL [employer] [address] / Product Type: Commerical /      In time: 9:00 Out time: 10:05   Total Treatment Time: 65     BCBS/Medicare Time Tracking (below)   Total Timed Codes (min):  na 1:1 Treatment Time:  na     TREATMENT AREA =  Left knee pain [M25.562]    SUBJECTIVE  Pain Level (on 0 to 10 scale):  6  / 10   Medication Changes/New allergies or changes in medical history, any new surgeries or procedures? NO    If yes, update Summary List   Subjective Functional Status/Changes:  []  No changes reported     Pt reports seeing MD and will not follow-up again for until one year post-op. He notes doing more walking over the week and has had increased pain and swelling this morning. Pain is anterior joint line area.            Modalities Rationale:     decrease edema, decrease inflammation and decrease pain to improve patient's ability to prevent soreness   min [] Estim, type/location:                                      []  att     []  unatt     []  w/US     []  w/ice    []  w/heat    min []  Mechanical Traction: type/lbs                   []  pro   []  sup   []  int   []  cont    []  before manual    []  after manual    min []  Ultrasound, settings/location:      min []  Iontophoresis w/ dexamethasone, location:                                               []  take home patch       []  in clinic   10 min [x]  Ice     []  Heat    location/position: L Knee - supine after treatment    min []  Vasopneumatic Device, press/temp:     min []  Other:    [x] Skin assessment post-treatment (if applicable):    [x]  intact    [x]  redness- no adverse reaction     []redness - adverse reaction:        55 min Therapeutic Exercise:  [x]  See flow sheet   Rationale:      increase ROM, increase strength, improve coordination and increase proprioception to improve the patients ability to increase activity tolerance       Billed With/As:   [] TE   [] TA   [] Neuro   [] Self Care Patient Education: [x] Review HEP    [] Progressed/Changed HEP based on:   [] positioning   [] body mechanics   [] transfers   [] heat/ice application    [] other:      Other Objective/Functional Measures:    Held steps today due to increased pain/swelling and some extension ROM loss. Encouraged pt to increase RICE principle at home to reduce pain/swelling. Post Treatment Pain Level (on 0 to 10) scale:   4  / 10     ASSESSMENT  Assessment/Changes in Function:     Pt able to achieve full knee extension with stretching, but had some discomfort with initial attempts. []  See Progress Note   Patient will continue to benefit from skilled PT services to modify and progress therapeutic interventions, address functional mobility deficits, address ROM deficits, address strength deficits, analyze and address soft tissue restrictions, analyze and cue movement patterns, analyze and modify body mechanics/ergonomics, assess and modify postural abnormalities and address imbalance/dizziness to attain remaining goals. Progress toward goals / Updated goals:    Minimal progress today due to pain/swelling.        PLAN  [x]  Upgrade activities as tolerated YES Continue plan of care   []  Discharge due to :    []  Other:      Therapist: Raul Zelaya, PT    Date: 5/11/2020 Time: 9:10 AM     Future Appointments   Date Time Provider Brice Bush   5/11/2020  9:00 AM [place]erma, [place] [facility] [place]   5/18/2020  9:00 AM Malena Wise, [place] [facility] [place]

## 2020-05-18 ENCOUNTER — HOSPITAL ENCOUNTER (OUTPATIENT)
Dept: PHYSICAL THERAPY | Age: 60
Discharge: HOME | End: 2020-05-18
Payer: COMMERCIAL

## 2020-05-18 PROCEDURE — 97110 THERAPEUTIC EXERCISES: CPT | Performed by: PHYSICAL THERAPIST

## 2020-05-18 NOTE — PROGRESS NOTES
PHYSICAL THERAPY - DAILY TREATMENT NOTE    Patient Name: Jewell Peterson        Date: 2020  : 1960   YES Patient  Verified  Visit #:     Insurance: Payor: MEDICAL MUTUAL [facility] / Plan: [facility] MEDICAL [employer]UTUAL [address] / Product Type: Commerical /      In time: 8:55 Out time: 10:00   Total Treatment Time: 65     BCBS/Medicare Time Tracking (below)   Total Timed Codes (min):  na 1:1 Treatment Time:  na     TREATMENT AREA =  Left knee pain [M25.562]    SUBJECTIVE  Pain Level (on 0 to 10 scale):  3  / 10   Medication Changes/New allergies or changes in medical history, any new surgeries or procedures? NO    If yes, update Summary List   Subjective Functional Status/Changes:  []  No changes reported     Pt reports having a good week regardng his L knee. He was able to chip and putt without any pain/swelling. He has had some popping sensation on the lateral knee, but not constant.         Modalities Rationale:     decrease edema, decrease inflammation and decrease pain to improve patient's ability to prevent soreness   min [] Estim, type/location:                                      []  att     []  unatt     []  w/US     []  w/ice    []  w/heat    min []  Mechanical Traction: type/lbs                   []  pro   []  sup   []  int   []  cont    []  before manual    []  after manual    min []  Ultrasound, settings/location:      min []  Iontophoresis w/ dexamethasone, location:                                               []  take home patch       []  in clinic   10 min [x]  Ice     []  Heat    location/position: L Knee - supine after treatment    min []  Vasopneumatic Device, press/temp:     min []  Other:    [x] Skin assessment post-treatment (if applicable):    [x]  intact    [x]  redness- no adverse reaction     []redness - adverse reaction:        55 min Therapeutic Exercise:  [x]  See flow sheet   Rationale:      increase ROM, increase strength, improve coordination and increase proprioception to improve the patients ability to increase activity tolerance       Billed With/As:   [] TE   [] TA   [] Neuro   [] Self Care Patient Education: [x] Review HEP    [] Progressed/Changed HEP based on:   [] positioning   [] body mechanics   [] transfers   [] heat/ice application    [] other:      Other Objective/Functional Measures:    Held retro walk on treadmill as his knee extension is good today    Instructed pt in scar mobilization     Post Treatment Pain Level (on 0 to 10) scale:   3  / 10     ASSESSMENT  Assessment/Changes in Function:     Progressing well with regaining functional strength/endurance without an increase in swelling. []  See Progress Note   Patient will continue to benefit from skilled PT services to modify and progress therapeutic interventions, address functional mobility deficits, address ROM deficits, address strength deficits, analyze and address soft tissue restrictions, analyze and cue movement patterns, analyze and modify body mechanics/ergonomics, assess and modify postural abnormalities and address imbalance/dizziness to attain remaining goals. Progress toward goals / Updated goals:    Making good progress toward LTGs.        PLAN  [x]  Upgrade activities as tolerated YES Continue plan of care   []  Discharge due to :    []  Other:      Therapist: Zachery Singletary PT    Date: 5/18/2020 Time: 9:10 AM     Future Appointments   Date Time Provider Brice Bush   5/18/2020  9:00 AM Roro Burch PT [facility] [place]

## 2020-06-01 ENCOUNTER — HOSPITAL ENCOUNTER (OUTPATIENT)
Dept: PHYSICAL THERAPY | Age: 60
End: 2020-06-01

## 2020-06-01 ENCOUNTER — HOSPITAL ENCOUNTER (OUTPATIENT)
Dept: PHYSICAL THERAPY | Age: 60
Discharge: HOME | End: 2020-06-01
Payer: COMMERCIAL

## 2020-06-01 PROCEDURE — 97110 THERAPEUTIC EXERCISES: CPT | Performed by: PHYSICAL THERAPIST

## 2020-06-01 NOTE — PROGRESS NOTES
PHYSICAL THERAPY - DAILY TREATMENT NOTE    Patient Name: Malina Gomez        Date: 2020  : 1960   YES Patient  Verified  Visit #:     Insurance: Payor: MEDICAL M[employer] [facility] / Plan: [facility] MEDICAL [employer]UTUAL [address] / Product Type: Commerical /      In time: 9:00 Out time: 9:55   Total Treatment Time: 55     BCBS/Medicare Time Tracking (below)   Total Timed Codes (min):  na 1:1 Treatment Time:  na     TREATMENT AREA =  Left knee pain [M25.562]    SUBJECTIVE  Pain Level (on 0 to 10 scale):  2  / 10   Medication Changes/New allergies or changes in medical history, any new surgeries or procedures? NO    If yes, update Summary List   Subjective Functional Status/Changes:  []  No changes reported     Pt reports seeing a significant reduction in swelling in knee. The snapping on the lateral knee has bene improving, but is still there. Medial knee pain comes/goes without a known cause.         Modalities Rationale:     decrease edema, decrease inflammation and decrease pain to improve patient's ability to prevent soreness   min [] Estim, type/location:                                      []  att     []  unatt     []  w/US     []  w/ice    []  w/heat    min []  Mechanical Traction: type/lbs                   []  pro   []  sup   []  int   []  cont    []  before manual    []  after manual    min []  Ultrasound, settings/location:      min []  Iontophoresis w/ dexamethasone, location:                                               []  take home patch       []  in clinic   10 min [x]  Ice     []  Heat    location/position: L Knee - supine after treatment    min []  Vasopneumatic Device, press/temp:     min []  Other:    [x] Skin assessment post-treatment (if applicable):    [x]  intact    [x]  redness- no adverse reaction     []redness  adverse reaction:        45 min Therapeutic Exercise:  [x]  See flow sheet   Rationale:      increase ROM, increase strength, improve coordination and increase proprioception to improve the patients ability to increase activity tolerance       Billed With/As:   [] TE   [] TA   [] Neuro   [] Self Care Patient Education: [x] Review HEP    [] Progressed/Changed HEP based on:   [] positioning   [] body mechanics   [] transfers   [] heat/ice application    [] other:      Other Objective/Functional Measures:    Achieved 120° L knee flexion using strap today with less pain than prior sessions     Post Treatment Pain Level (on 0 to 10) scale:   2/ 10     ASSESSMENT  Assessment/Changes in Function:     Pt showing improved strength and ROM with less pain/swelling noted. []  See Progress Note   Patient will continue to benefit from skilled PT services to modify and progress therapeutic interventions, address functional mobility deficits, address ROM deficits, address strength deficits, analyze and address soft tissue restrictions, analyze and cue movement patterns, analyze and modify body mechanics/ergonomics, assess and modify postural abnormalities and address imbalance/dizziness to attain remaining goals. Progress toward goals / Updated goals:    Making good progress toward all LTGs.        PLAN  [x]  Upgrade activities as tolerated YES Continue plan of care   []  Discharge due to :    []  Other:      Therapist: Curt Avery PT    Date: 6/1/2020 Time: 9:10 AM     Future Appointments   Date Time Provider Brice Bush   6/8/2020  9:00 AM Kevon Conde, PT [facility]   6/15/2020  9:00 AM Natalie Wise, PT [facility]

## 2020-06-08 ENCOUNTER — APPOINTMENT (OUTPATIENT)
Dept: PHYSICAL THERAPY | Age: 60
End: 2020-06-08

## 2020-06-08 ENCOUNTER — HOSPITAL ENCOUNTER (OUTPATIENT)
Dept: PHYSICAL THERAPY | Age: 60
Discharge: HOME | End: 2020-06-08
Payer: COMMERCIAL

## 2020-06-08 ENCOUNTER — APPOINTMENT (OUTPATIENT)
Dept: PHYSICAL THERAPY | Age: 60
End: 2020-06-08
Payer: COMMERCIAL

## 2020-06-08 PROCEDURE — 97110 THERAPEUTIC EXERCISES: CPT | Performed by: PHYSICAL THERAPIST

## 2020-06-08 NOTE — PROGRESS NOTES
PHYSICAL THERAPY - DAILY TREATMENT NOTE    Patient Name: Joseluis Zimmerman        Date: 2020  : 1960   YES Patient  Verified  Visit #:     Insurance: Payor: MEDICAL MUTUAL [facility] / Plan: [facility] MEDICAL [employer]UTUAL [address] / Product Type: Commerical /      In time: 8:55 Out time: 9:50   Total Treatment Time: 55     BCBS/Medicare Time Tracking (below)   Total Timed Codes (min):  na 1:1 Treatment Time:  na     TREATMENT AREA =  Left knee pain [M25.562]    SUBJECTIVE  Pain Level (on 0 to 10 scale):  2-3   10   Medication Changes/New allergies or changes in medical history, any new surgeries or procedures? NO    If yes, update Summary List   Subjective Functional Status/Changes:  []  No changes reported     Pt reports hitting a bucket of golf balls last week and had some mild swelling afterward. His knee became more swollen after spending hours each day in the pool this weekend.          Modalities Rationale:     decrease edema, decrease inflammation and decrease pain to improve patient's ability to prevent soreness   min [] Estim, type/location:                                      []  att     []  unatt     []  w/US     []  w/ice    []  w/heat    min []  Mechanical Traction: type/lbs                   []  pro   []  sup   []  int   []  cont    []  before manual    []  after manual    min []  Ultrasound, settings/location:      min []  Iontophoresis w/ dexamethasone, location:                                               []  take home patch       []  in clinic   10 min [x]  Ice     []  Heat    location/position: L Knee - supine after treatment    min []  Vasopneumatic Device, press/temp:     min []  Other:    [x] Skin assessment post-treatment (if applicable):    [x]  intact    [x]  redness- no adverse reaction     []redness  adverse reaction:        45 min Therapeutic Exercise:  [x]  See flow sheet   Rationale:      increase ROM, increase strength, improve coordination and increase proprioception to improve the patients ability to increase activity tolerance       Billed With/As:   [] TE   [] TA   [] Neuro   [] Self Care Patient Education: [x] Review HEP    [] Progressed/Changed HEP based on:   [] positioning   [] body mechanics   [] transfers   [] heat/ice application    [] other:      Other Objective/Functional Measures:    Pt was able to improve gait (avhieve full extension) more from retro walk on treadmill than from static stretching today   Post Treatment Pain Level (on 0 to 10) scale:   2/ 10     ASSESSMENT  Assessment/Changes in Function:     Pt encouraged to keep focus on using cold packs to limit swelling/pain, and use retro walk on treadmill at home to help improve gait mechanics before dog walking etc.     []  See Progress Note   Patient will continue to benefit from skilled PT services to modify and progress therapeutic interventions, address functional mobility deficits, address ROM deficits, address strength deficits, analyze and address soft tissue restrictions, analyze and cue movement patterns, analyze and modify body mechanics/ergonomics, assess and modify postural abnormalities and address imbalance/dizziness to attain remaining goals. Progress toward goals / Updated goals: Will continue QW to progress strength while minimizing swelling/pain and progressing with functional activities.        PLAN  [x]  Upgrade activities as tolerated YES Continue plan of care   []  Discharge due to :    []  Other:      Therapist: Jenn Hendrickson PT    Date: 6/8/2020 Time: 9:10 AM     Future Appointments   Date Time Provider Brice Bush   6/8/2020  9:00 AM Marchia Opitz, PT [facility]   6/15/2020  9:00 AM Naas, Zenia Boxer, PT [facility]

## 2020-06-15 ENCOUNTER — APPOINTMENT (OUTPATIENT)
Dept: PHYSICAL THERAPY | Age: 60
End: 2020-06-15

## 2020-06-15 ENCOUNTER — HOSPITAL ENCOUNTER (OUTPATIENT)
Dept: PHYSICAL THERAPY | Age: 60
Discharge: HOME | End: 2020-06-15
Payer: COMMERCIAL

## 2020-06-15 ENCOUNTER — APPOINTMENT (OUTPATIENT)
Dept: PHYSICAL THERAPY | Age: 60
End: 2020-06-15
Payer: COMMERCIAL

## 2020-06-15 PROCEDURE — 97110 THERAPEUTIC EXERCISES: CPT | Performed by: PHYSICAL THERAPIST

## 2020-06-15 NOTE — PROGRESS NOTES
[address] [facility] PHYSICAL THERAPY AT [address] [place] [address]  Jarek Mccarthy 82, [address] [place] [address], [address]  Phone: [phone]  Fax: [phone] SUMMARY  Patient Name: Brooklyn Dyer : 1960   Treatment/Medical Diagnosis: Left knee pain [M25.562]   Referral Source: Estrellita Lopez MD     Date of Initial Visit: 3/5/19 Attended Visits: 19 Missed Visits: 0     SUMMARY OF TREATMENT  L knee flexibility exercises, strengthening/endurance, gait training, and modalities for symptom/swelling relief. CURRENT STATUS  Pt was last seen on 6/15/19, and reported pain of 3/10. He has been able to resume playing some golf, ping pong, and [employer] without increase in pain. Pt still has mild swelling in L knee, especially after the weekends. Swelling can make him walk with slight limp due to lacking full extension, but he is able to regain with stretching and VCs. With stretching, his L knee PROM was 0 degrees extension and 120 degrees of flexion. Mr Ciro Pallas is able to go up/down stairs with reciprocal steps regularly. Pt is independent with HEP and will be discharged from PT. Goal/Measure of Progress Goal Met? 1.  Pt to increase FOTO score from 41 to >/= 57. Status at last Eval: 41 Current Status: 57 yes   2. Pt independent with high level HEP for L knee flexibility, strengthening/endurance, and proprioception. Status at last Eval: Partial Current Status: Met yes   3. Pt to achieve 0-120 degrees of ROM to allow painfree ADLs. Status at last Eval: 0-117 degrees Current Status: 0-120 degrees yes   4. Pt able to go up/down a flight of stairs with reciprocal steps without any knee pain. Status at last Eval: Painful Current Status: Met yes     RECOMMENDATIONS  Discontinue therapy. Progressing towards or have reached established goals. If you have any questions/comments please contact us directly at [phone].    Thank you for allowing us to assist in the care of your patient. Therapist Signature:  Lilly Toledo PT Date: 6/15/20     Time: 9:21 AM

## 2020-06-15 NOTE — PROGRESS NOTES
PHYSICAL THERAPY - DAILY TREATMENT NOTE    Patient Name: Jewell Peterson        Date: 6/15/2020  : 1960   YES Patient  Verified  Visit #:     Insurance: Payor: [id number] / Plan: [facility] [employer] [address] / Product Type: Commerical /      In time: 9:00 Out time: 9:55   Total Treatment Time: 55     BCBS/Medicare Time Tracking (below)   Total Timed Codes (min):  na 1:1 Treatment Time:  na     TREATMENT AREA =  Left knee pain [M25.562]    SUBJECTIVE  Pain Level (on 0 to 10 scale):  3  / 10   Medication Changes/New allergies or changes in medical history, any new surgeries or procedures? NO    If yes, update Summary List   Subjective Functional Status/Changes:  []  No changes reported     Pt reports his knee is swollen because he played a lot of [employer] over the weekend. His pain is unchanged.         Modalities Rationale:     decrease edema, decrease inflammation and decrease pain to improve patient's ability to prevent soreness   min [] Estim, type/location:                                      []  att     []  unatt     []  w/US     []  w/ice    []  w/heat    min []  Mechanical Traction: type/lbs                   []  pro   []  sup   []  int   []  cont    []  before manual    []  after manual    min []  Ultrasound, settings/location:      min []  Iontophoresis w/ dexamethasone, location:                                               []  take home patch       []  in clinic   10 min [x]  Ice     []  Heat    location/position: L Knee - supine after treatment    min []  Vasopneumatic Device, press/temp:     min []  Other:    [x] Skin assessment post-treatment (if applicable):    [x]  intact    [x]  redness- no adverse reaction     []redness  adverse reaction:        45 min Therapeutic Exercise:  [x]  See flow sheet   Rationale:      increase ROM, increase strength, improve coordination and increase proprioception to improve the patients ability to increase activity tolerance       Billed With/As:   [] TE   [] TA   [] Neuro   [] Self Care Patient Education: [x] Review HEP    [] Progressed/Changed HEP based on:   [] positioning   [] body mechanics   [] transfers   [] heat/ice application    [] other:      Other Objective/Functional Measures:    FOTO - 57    Able to achieve full extension with stretching, and 120 degrees of flexion with self-stretch     Post Treatment Pain Level (on 0 to 10) scale:   2/ 10     ASSESSMENT  Assessment/Changes in Function:     Pt is progressing well with regaining functional strength. HE is I w HEP and will be discharged from PT.          [x]  See DC Note      Progress toward goals / Updated goals:    See DC Note       PLAN  []  Upgrade activities as tolerated    [x]  Discharge due to : I w HEP   []  Other:      Therapist: Luciano James PT    Date: 6/15/2020 Time: 9:10 AM     Future Appointments   Date Time Provider Brice Bush   6/15/2020  9:00 AM Stephanie Wise, PT [facility]

## 2020-08-18 NOTE — INTERVAL H&P NOTE
H&P Update:  Jeanine Greenfield was seen and examined. History and physical has been reviewed. The patient has been examined. There have been no significant clinical changes since the completion of the originally dated History and Physical.  Patient identified by surgeon; surgical site was confirmed by patient and surgeon.
Unable to Assess

## 2021-05-25 ENCOUNTER — TRANSCRIBE ORDER (OUTPATIENT)
Dept: SCHEDULING | Age: 61
End: 2021-05-25

## 2021-05-25 DIAGNOSIS — M25.562 LEFT KNEE PAIN: Primary | ICD-10-CM

## 2021-06-10 ENCOUNTER — HOSPITAL ENCOUNTER (OUTPATIENT)
Dept: NUCLEAR MEDICINE | Age: 61
Discharge: HOME | End: 2021-06-10
Attending: ORTHOPAEDIC SURGERY
Payer: COMMERCIAL

## 2021-06-10 ENCOUNTER — APPOINTMENT (OUTPATIENT)
Dept: NUCLEAR MEDICINE | Age: 61
End: 2021-06-10
Attending: ORTHOPAEDIC SURGERY

## 2021-06-10 ENCOUNTER — HOSPITAL ENCOUNTER (OUTPATIENT)
Dept: NUCLEAR MEDICINE | Age: 61
End: 2021-06-10
Attending: ORTHOPAEDIC SURGERY

## 2021-06-10 DIAGNOSIS — M25.562 LEFT KNEE PAIN: ICD-10-CM

## 2021-06-10 PROCEDURE — A9503 TC99M MEDRONATE: HCPCS | Performed by: ORTHOPAEDIC SURGERY

## 2021-06-10 PROCEDURE — 74011250636 HC RX REV CODE- 250/636: Performed by: ORTHOPAEDIC SURGERY

## 2021-06-10 PROCEDURE — A9503 TC99M MEDRONATE: HCPCS

## 2021-06-10 RX ADMIN — TECHNETIUM TC 99M MEDRONATE 28 MILLICURIE: 25 INJECTION, POWDER, FOR SOLUTION INTRAVENOUS at 10:22

## 2022-03-18 PROBLEM — M17.12 PRIMARY OSTEOARTHRITIS OF LEFT KNEE: Status: ACTIVE | Noted: 2020-02-17

## 2022-03-19 PROBLEM — E66.01 SEVERE OBESITY (HCC): Status: ACTIVE | Noted: 2019-11-07

## 2022-03-19 PROBLEM — B19.20 HEPATITIS C: Status: ACTIVE | Noted: 2020-02-17

## 2022-03-19 PROBLEM — Z96.659 PAIN DUE TO KNEE JOINT PROSTHESIS (HCC): Status: ACTIVE | Noted: 2017-04-03

## 2022-03-19 PROBLEM — T84.84XA PAIN DUE TO KNEE JOINT PROSTHESIS (HCC): Status: ACTIVE | Noted: 2017-04-03

## 2022-03-19 PROBLEM — M17.12 LEFT KNEE DJD: Status: ACTIVE | Noted: 2020-02-19

## 2022-03-20 PROBLEM — T84.84XA PAIN DUE TO UNICOMPARTMENTAL ARTHROPLASTY OF KNEE (HCC): Status: ACTIVE | Noted: 2017-04-04

## 2022-03-20 PROBLEM — G47.30 SLEEP APNEA: Status: ACTIVE | Noted: 2020-02-17

## 2022-03-20 PROBLEM — Z96.659 PAIN DUE TO UNICOMPARTMENTAL ARTHROPLASTY OF KNEE (HCC): Status: ACTIVE | Noted: 2017-04-04

## 2022-05-19 ENCOUNTER — HOSPITAL ENCOUNTER (OUTPATIENT)
Dept: PHYSICAL THERAPY | Age: 62
Discharge: HOME | End: 2022-05-19
Payer: COMMERCIAL

## 2022-05-19 PROCEDURE — 97140 MANUAL THERAPY 1/> REGIONS: CPT

## 2022-05-19 PROCEDURE — 97535 SELF CARE MNGMENT TRAINING: CPT

## 2022-05-19 PROCEDURE — 97162 PT EVAL MOD COMPLEX 30 MIN: CPT

## 2022-05-19 NOTE — PROGRESS NOTES
[address] [place] [address] PHYSICAL THERAPY   [facility], [facility], [facility] - Phone: [phone]  Fax: [phone] / [address] [facility]  Patient Name: Manuela Herzog : 1960   Medical   Diagnosis: Left knee pain [M25.562] Treatment Diagnosis: L knee pain   Onset Date: 2020     Referral Source: Hermes Foster MD [place] [facility]): 2022   Prior Hospitalization: See medical history Provider #: [id number]   Prior Level of Function: Chronic knee pain with activities and prolonged sitting   Comorbidities: Arthritis, BMI >30   Medications: Verified on Patient Summary List   The Plan of Care and following information is based on the information from the initial evaluation.   ===========================================================================================  Assessment / key information:  Manuela Herzog is a 63 y.o. male with Dx: L knee pain, who under went R TKA on 2020. Had PT right after the surgery, but reports it continues to be sore and painful. Reports playing pickle ball, descending stairs, and driving all make pain worse. X-rays show everything looks good. Reports \"squeezing\" it makes it feel better along with NSAID. Pt rates pain as 8/10 max, 1/10 at best, 5/10 currently. Pt would like to return to playing pickle ball 3x/week without swelling and pain, negotiating stairs and sitting for prolonged periods. Occupation:   Objective: FOTO score = 52 (an established functional score where 100 = no disability). Gait: L hip ER. Observation: Decreased girth in L calf and quad (no measurement this date due to clothing)  Mobility: patellar mobility on L restricted; L ankle with significant hypomobility in subtalar joint, talocrural joint, midfoot and forefoot. Knee ROM Flexion R 113 with discomfort L 126, Extension R 0 L 0.    Strength: Hip Flexion R 5/5 L 5/5, ABD R 4-/5 L BMP results after BP check below:  Component      Latest Ref Rng & Units 7/6/2017   Sodium      133 - 144 mmol/L 136   Potassium      3.4 - 5.3 mmol/L 4.3   Chloride      94 - 109 mmol/L 103   Carbon Dioxide      20 - 32 mmol/L 27   Anion Gap      3 - 14 mmol/L 6   Glucose      70 - 99 mg/dL 90   Urea Nitrogen      7 - 30 mg/dL 23   Creatinine      0.52 - 1.04 mg/dL 0.88   GFR Estimate      >60 mL/min/1.7m2 61   GFR Estimate If Black      >60 mL/min/1.7m2 74   Calcium      8.5 - 10.1 mg/dL 8.6     Routing to Iona Bui RN   4/5, Extension R 4-/5 L 4-/5, Knee Flexion R 4+/5 L 5/5, Extension R 4/5 L 5/5. Functional Squat knee angle 119. SL STS R good, L fair- with pain  SLS R 10 L 10. Current deficits include decreased eccentric control, decreased ROM, and decreased strength in L LE leading to difficulty with descending stairs, prolonged positions, and higher level activity. Pt will benefit from a comprehensive POC/HEP to address impairments and restore function in order to return to prior level of function and prevent secondary impairments. We will introduce BFR training as tolerated to LLE to improve strength, decreased pain and improve coordination.  ===========================================================================================  Eval Complexity: History MEDIUM  Complexity : 1-2 comorbidities / personal factors will impact the outcome/ POC ;  Examination  HIGH Complexity : 4+ Standardized tests and measures addressing body structure, function, activity limitation and / or participation in recreation ; Presentation MEDIUM Complexity : Evolving with changing characteristics ;   Decision Making MEDIUM Complexity : FOTO score of 26-74; Overall Complexity MEDIUM  Problem List: pain affecting function, decrease ROM, decrease strength, impaired gait/ balance, decrease ADL/ functional abilitiies, decrease activity tolerance, decrease flexibility/ joint mobility, and decrease transfer abilities   Treatment Plan may include any combination of the following: Therapeutic exercise, Therapeutic activities, Neuromuscular re-education, Physical agent/modality, Gait/balance training, Manual therapy, Aquatic therapy, Patient education, Self Care training, Functional mobility training, Home safety training, and Stair training  Patient / Family readiness to learn indicated by: asking questions, trying to perform skills, and interest  Persons(s) to be included in education: patient (P)  Barriers to Learning/Limitations: None  Measures taken, if barriers to learning:    Patient Goal (s): Stronger leg and knee   Patient self reported health status: good  Rehabilitation Potential: good   Short Term Goals: To be accomplished in  3  weeks:  1. Independent with HEP to progress to meet goals. 2. Pt to report decreased max pain levels less than 6/10 for improvement in ADLs.  Long Term Goals: To be accomplished in  6  weeks:  1. Improve FOTO score to 70/100 to show a significant functional change. 2. Pt to report decreased max pain levels less than 3/10 for improvement in QoL. 3. Pt to demonstrate step down on 6 inch stair with good+ control for ease of ADLs. Frequency / Duration:   Patient to be seen  2  times per week for 6  weeks:  Patient / Caregiver education and instruction: self care, activity modification and exercises  Therapist Signature: Tiffany Holman PT, DPT Date: 5/12/5108   Certification Period: na Time: 9:32 AM   ===========================================================================================  I certify that the above Physical Therapy Services are being furnished while the patient is under my care. I agree with the treatment plan and certify that this therapy is necessary. Physician Signature:        Date:       Time:                                        Kobe Maynard MD  Please sign and return to [facility] or you may fax the signed copy to [phone]. Thank you.

## 2022-05-19 NOTE — PROGRESS NOTES
PHYSICAL THERAPY - DAILY TREATMENT NOTE    Patient Name: Sadie Elizondo        Date: 2022  : 1960   yes Patient  Verified  Visit #:      12  Insurance: Payor: Terence Zhang / Plan: [facility] / Product Type: PPO /      In time: 1210 Out time: 4439   Total Treatment Time: 45     Medicare/[facility] Time Tracking (below)   Total Timed Codes (min):  23 1:1 Treatment Time:  45     TREATMENT AREA =  Left knee pain [M25.562]    SUBJECTIVE  Pain Level (on 0 to 10 scale):  5  / 10   Medication Changes/New allergies or changes in medical history, any new surgeries or procedures?    no  If yes, update Summary List   Subjective Functional Status/Changes:  []  No changes reported     See POC          OBJECTIVE  15 min Manual Therapy: L ankle mobs to TCJ, STJ, forefoot and midfoot. Rationale:      increase ROM to improve patient's ability to improve gait and push off. The manual therapy interventions were performed at a separate and distinct time from the therapeutic activities interventions. 8 min Self Care: Pt education on HEP, POC, prognosis, and diagnosis. Rationale:    Improve pt understanding to improve the patients ability to progress therapy at home. Billed With/As:   [] TE   [] TA   [] Neuro   [x] Self Care Patient Education: [x] Review HEP    [] Progressed/Changed HEP based on:   [] positioning   [] body mechanics   [] transfers   [] heat/ice application    [] other:      Other Objective/Functional Measures:    Shown and performed HEP    Access Code: [email or web address]  URL: [email or web address]  Date: 2022  Prepared by: Justin Pangon    Exercises  Single Leg Bridge - 2 x daily - 7 x weekly - 2 sets - 10 reps  Prone Hip Extension - 2 x daily - 7 x weekly - 2 sets - 10 reps  Single Leg Sit to Stand with Arms Extended - 2 x daily - 7 x weekly - 2 sets - 8 reps       Post Treatment Pain Level (on 0 to 10) scale:   5  / 10     ASSESSMENT  Assessment/Changes in Function:     See POC     []  See Progress Note/Recertification   Patient will continue to benefit from skilled PT services to modify and progress therapeutic interventions, address functional mobility deficits, analyze and cue movement patterns, and analyze and modify body mechanics/ergonomics to attain remaining goals.    Progress toward goals / Updated goals:    See POC     PLAN  [x]  Upgrade activities as tolerated yes Continue plan of care   []  Discharge due to :    []  Other:      Therapist: Selvin Juan PT , DPT    Date: 5/19/2022 Time: 9:32 AM     Future Appointments   Date Time Provider Brice Bush   5/19/2022 12:00 PM Bj Shafer PT [facility]

## 2022-05-23 ENCOUNTER — HOSPITAL ENCOUNTER (OUTPATIENT)
Dept: PHYSICAL THERAPY | Age: 62
Discharge: HOME | End: 2022-05-23
Payer: COMMERCIAL

## 2022-05-23 PROCEDURE — 97530 THERAPEUTIC ACTIVITIES: CPT

## 2022-05-23 PROCEDURE — 97112 NEUROMUSCULAR REEDUCATION: CPT

## 2022-05-23 PROCEDURE — 97110 THERAPEUTIC EXERCISES: CPT

## 2022-05-23 PROCEDURE — 97545 WORK HARDENING: CPT

## 2022-05-23 NOTE — PROGRESS NOTES
PHYSICAL THERAPY - DAILY TREATMENT NOTE    Patient Name: Fidelina Toledo        Date: 2022  : 1960   yes Patient  Verified  Visit #:   2   of   12  Insurance: Payor: Vandana Singh / Plan: Nimo Rashawn / Product Type: PPO /      In time: 345 Out time: 425   Total Treatment Time: 40     Medicare/[facility] Time Tracking (below)   Total Timed Codes (min):  40 1:1 Treatment Time:  40     TREATMENT AREA =  Left knee pain [M25.562]    SUBJECTIVE  Pain Level (on 0 to 10 scale):  5 with stairs or sport  / 10   Medication Changes/New allergies or changes in medical history, any new surgeries or procedures? NO  If yes, update Summary List   Subjective Functional Status/Changes:  []  No changes reported     Patient reporting pain when descending stairs or during sport activity. OBJECTIVE      20 min Therapeutic Exercise:  [x]  See flow sheet   Rationale:      increase ROM, increase strength, improve coordination, improve balance and increase proprioception to improve the patients ability to return to PLOF     10 min Therapeutic Activity: [x]  See flow sheet   Rationale:    increase ROM, increase strength, improve coordination, improve balance and increase proprioception to improve the patients ability to return to PLOF    10 min Neuromuscular Re-ed: [x]  See flow sheet   Rationale:    increase ROM, increase strength, improve coordination, improve balance and increase proprioception to improve the patients ability to facilitate coordinated muscular contractions for ease of movement. Billed With/As:   [] TE   [] TA   [] Neuro   [] Self Care Patient Education: [x] Review HEP    [] Progressed/Changed HEP based on:   [] positioning   [] body mechanics   [] transfers   [] heat/ice application    [] other:      Other Objective/Functional Measures:    See flowsheet for drills, loads and volume. Req'd review of HEP for bridging and SL STS.      Post Treatment Pain Level (on 0 to 10) scale:   5 / 10     ASSESSMENT  Assessment/Changes in Function:     Chart reviewed and subjective taken. Pt requires cues and instruction for all drills, form, therapeutic focus, and carryover. Followed up on HEP plan to ensure compliance. Reviewed SL bridging and SL STS form with pt to increase glute and quad engagement. []  See Progress Note/Recertification   Patient will continue to benefit from skilled PT services to modify and progress therapeutic interventions, address functional mobility deficits, address ROM deficits, address strength deficits, analyze and cue movement patterns, analyze and modify body mechanics/ergonomics, assess and modify postural abnormalities and instruct in home and community integration to attain remaining goals. Progress toward goals / Updated goals:    First follow-up since IE.         PLAN  [x]  Upgrade activities as tolerated yes Continue plan of care   []  Discharge due to :    []  Other:      Therapist: Maria Alejandra Hayes PTA    Date: 5/23/2022 Time: 4:00 PM     Future Appointments   Date Time Provider Brice Bush   6/6/2022  3:45 PM Raji Kay, JAUN [address]   6/9/2022  3:45 PM Bradley Dixon [facility]   6/13/2022  3:45 PM Raji Kay, PT [facility]   6/15/2022  3:15 PM [facility] Kay PT [facility]   6/20/2022  3:45 PM Raji Kay PT [facility]   6/23/2022  3:30 PM Raji Kay PT [facility]   6/27/2022  3:45 PM Raji Kay, PT [facility]   6/30/2022  3:30 PM Raji Kay PT [facility]   7/5/2022  3:45 PM Raji Kay PT [facility]   7/7/2022  3:30 PM Raji Kay PT [facility]   7/11/2022  3:45 PM Alan Ku PTA [facility]   7/14/2022  3:45 PM Esequiel Jeffrey [facility]

## 2022-06-06 ENCOUNTER — HOSPITAL ENCOUNTER (OUTPATIENT)
Dept: LAB | Age: 62
Discharge: HOME | End: 2022-06-06
Payer: COMMERCIAL

## 2022-06-06 ENCOUNTER — HOSPITAL ENCOUNTER (OUTPATIENT)
Dept: PHYSICAL THERAPY | Age: 62
Discharge: HOME | End: 2022-06-06
Payer: COMMERCIAL

## 2022-06-06 LAB
ALBUMIN SERPL-MCNC: 4 G/DL (ref 3.4–5)
ALBUMIN/GLOB SERPL: 1.1 {RATIO} (ref 0.8–1.7)
ALP SERPL-CCNC: 63 U/L (ref 45–117)
ALT SERPL-CCNC: 30 U/L (ref 16–61)
ANION GAP SERPL CALC-SCNC: 5 MMOL/L (ref 3–18)
AST SERPL-CCNC: 24 U/L (ref 10–38)
BILIRUB SERPL-MCNC: 0.5 MG/DL (ref 0.2–1)
BUN SERPL-MCNC: 30 MG/DL (ref 7–18)
BUN/CREAT SERPL: 34 (ref 12–20)
CALCIUM SERPL-MCNC: 9.2 MG/DL (ref 8.5–10.1)
CHLORIDE SERPL-SCNC: 107 MMOL/L (ref 100–111)
CHOLEST SERPL-MCNC: 199 MG/DL
CO2 SERPL-SCNC: 26 MMOL/L (ref 21–32)
CREAT SERPL-MCNC: 0.88 MG/DL (ref 0.6–1.3)
ERYTHROCYTE [DISTWIDTH] IN BLOOD BY AUTOMATED COUNT: 13.1 % (ref 11.6–14.5)
EST. AVERAGE GLUCOSE BLD GHB EST-MCNC: 103 MG/DL
GLOBULIN SER CALC-MCNC: 3.5 G/DL (ref 2–4)
GLUCOSE SERPL-MCNC: 99 MG/DL (ref 74–99)
HBA1C MFR BLD: 5.2 % (ref 4.2–5.6)
HCT VFR BLD AUTO: 43.8 % (ref 36–48)
HDLC SERPL-MCNC: 62 MG/DL (ref 40–60)
HDLC SERPL: 3.2 {RATIO} (ref 0–5)
HGB BLD-MCNC: 14.8 G/DL (ref 13–16)
LDLC SERPL CALC-MCNC: 126 MG/DL (ref 0–100)
LIPID PROFILE,FLP: ABNORMAL
MCH RBC QN AUTO: 30.5 PG (ref 24–34)
MCHC RBC AUTO-ENTMCNC: 33.8 G/DL (ref 31–37)
MCV RBC AUTO: 90.3 FL (ref 78–100)
NRBC # BLD: 0 K/UL (ref 0–0.01)
NRBC BLD-RTO: 0 PER 100 WBC
PLATELET # BLD AUTO: 182 K/UL (ref 135–420)
PMV BLD AUTO: 10.3 FL (ref 9.2–11.8)
POTASSIUM SERPL-SCNC: 4.4 MMOL/L (ref 3.5–5.5)
PROT SERPL-MCNC: 7.5 G/DL (ref 6.4–8.2)
PSA SERPL-MCNC: 1.3 NG/ML (ref 0–4)
RBC # BLD AUTO: 4.85 M/UL (ref 4.35–5.65)
SODIUM SERPL-SCNC: 138 MMOL/L (ref 136–145)
TRIGL SERPL-MCNC: 55 MG/DL (ref ?–150)
TSH SERPL DL<=0.05 MIU/L-ACNC: 1.18 UIU/ML (ref 0.36–3.74)
VLDLC SERPL CALC-MCNC: 11 MG/DL
WBC # BLD AUTO: 5.5 K/UL (ref 4.6–13.2)

## 2022-06-06 PROCEDURE — 36415 COLL VENOUS BLD VENIPUNCTURE: CPT

## 2022-06-06 PROCEDURE — 97112 NEUROMUSCULAR REEDUCATION: CPT

## 2022-06-06 PROCEDURE — 84443 ASSAY THYROID STIM HORMONE: CPT

## 2022-06-06 PROCEDURE — 80053 COMPREHEN METABOLIC PANEL: CPT

## 2022-06-06 PROCEDURE — 97110 THERAPEUTIC EXERCISES: CPT

## 2022-06-06 PROCEDURE — 97140 MANUAL THERAPY 1/> REGIONS: CPT

## 2022-06-06 PROCEDURE — 83036 HEMOGLOBIN GLYCOSYLATED A1C: CPT

## 2022-06-06 PROCEDURE — 85027 COMPLETE CBC AUTOMATED: CPT

## 2022-06-06 PROCEDURE — 84153 ASSAY OF PSA TOTAL: CPT

## 2022-06-06 PROCEDURE — 80061 LIPID PANEL: CPT

## 2022-06-06 NOTE — PROGRESS NOTES
PHYSICAL THERAPY - DAILY TREATMENT NOTE    Patient Name: Iza Doty        Date: 2022  : 1960   yes Patient  Verified  Visit #:   3   of   12  Insurance: Payor: Sarah Omer / Plan: Theresa Smith / Product Type: PPO /      In time: 346 Out time: 429   Total Treatment Time: 43     Medicare/[employer] Time Tracking (below)   Total Timed Codes (min):  43 1:1 Treatment Time:  43     TREATMENT AREA =  Left knee pain [M25.562]    SUBJECTIVE  Pain Level (on 0 to 10 scale):  5 with stairs or sport  / 10   Medication Changes/New allergies or changes in medical history, any new surgeries or procedures? NO  If yes, update Summary List   Subjective Functional Status/Changes:  []  No changes reported     Reports the single leg pistol squats are getting easier. Thinks maybe the knee is better after pickle ball. OBJECTIVE    Blood Flow Restriction Procedure Note    Blood Flow Restriction Session Number:  1    If applicable, were there any adverse reactions to the last blood flow restriction therapy session? no      Chart reviewed for the following:  ISimona, PT, have reviewed the medical history, summary list and precautions/contraindications for Iza Doty. TIME OUT performed immediately prior to start of procedure:  350 (enter time the timeout was completed)  Simona TRIVEDI PT, have performed the following reviews on Iza Doty prior to the start of the session:        [x] Patient was identified by name and date of birth    [x] Purpose of blood flow restriction therapy, side effects, possible complications, risks and benefits were explained to the patient   [x] Procedure site(s) verified  [x] Informed Consent was signed (initial visit) and verified verbally (subsequent visits)  [x] Patient was instructed on the need to report any new medical conditions, procedures or medications.   [x] How to respond to possible adverse effects of treatment  [x] Opportunity was given to ask any questions, all questions were answered            Location(s) of blood flow restriction cuffs:   UE:   []  Right     []  Left             LE:    []  Right    [x]  Left           Pressure applied at the cuffs: 288mm HG (360 mm Hg is 100% occlusion)    Patients response to todays treatment:   [x]  General muscle soreness []  Numbness/tingling of extremity    []  Dizziness      []  Other:      [x] Skin assessment post-treatment:    [x]  intact    []  redness- no adverse reaction     []  redness - adverse reaction:        20 min Therapeutic Exercise:  [x]  See flow sheet   Rationale:      increase ROM, increase strength, improve coordination, improve balance and increase proprioception to improve the patients ability to return to PLOF     13 min Manual Therapy: L ankle ROM   Rationale:      increase ROM and increase tissue extensibility to improve patient's ability to DF ankle for improved stair descent. The manual therapy interventions were performed at a separate and distinct time from the therapeutic activities interventions. 10 min Neuromuscular Re-ed: [x]  See flow sheet   Rationale:    increase ROM, increase strength, improve coordination, improve balance and increase proprioception to improve the patients ability to facilitate coordinated muscular contractions for ease of movement. Billed With/As:   [x] TE   [] TA   [] Neuro   [] Self Care Patient Education: [x] Review HEP    [] Progressed/Changed HEP based on:   [] positioning   [] body mechanics   [] transfers   [] heat/ice application    [] other:      Other Objective/Functional Measures:    See flowsheet for drills, loads and volume. PT educated and signed to begin BFR treatments     Post Treatment Pain Level (on 0 to 10) scale:   0  / 10     ASSESSMENT  Assessment/Changes in Function:     Pt was educated in benefits and adverse reactions with BFR. Pt agreed to terms of BFR.  Performed SAQ and [address] under BFR to pt tolerance; required to lower pressure to 250mmHg for SAQ due to discomfort. MT to L ankle joint after BFR. []  See Progress Note/Recertification   Patient will continue to benefit from skilled PT services to modify and progress therapeutic interventions, address functional mobility deficits, address ROM deficits, address strength deficits, analyze and cue movement patterns, analyze and modify body mechanics/ergonomics, assess and modify postural abnormalities and instruct in home and community integration to attain remaining goals. Progress toward goals / Updated goals:    First session with BFR.        PLAN  [x]  Upgrade activities as tolerated yes Continue plan of care   []  Discharge due to :    []  Other:      Therapist: Carleen Larson PT    Date: 6/6/2022 Time: 4:00 PM     Future Appointments   Date Time Provider Brice Bush   6/6/2022  3:45 PM Juanice Peacemakristie, PT [facility] [id number]   6/9/2022  3:45 PM Kika Bella [facility]   6/13/2022  3:45 PM Juanice Peacemaker, PT [facility]   6/15/2022  3:15 PM Juanice Peacemaker, PT [facility]   6/20/2022  3:45 PM Juanice Peacemaker, PT [facility]   6/23/2022  3:30 PM Juanice Peacemaker, PT [facility]   6/27/2022  3:45 PM Juanice Peacemaker, PT [facility]   6/30/2022  3:30 PM Juanice Peacemaker, PT [facility]   7/5/2022  3:45 PM Juanice Peacemaker, PT [facility]   7/7/2022  3:30 PM Ziggye Peacemaker, PT [facility]   7/11/2022  3:45 PM Seymour Fabian PTA [facility]   7/14/2022  3:45 PM Yun Enrique [facility]

## 2022-06-07 LAB
FAX TO INFO,FAXT: NORMAL
FAX TO NUMBER,FAXN: NORMAL

## 2022-06-09 ENCOUNTER — HOSPITAL ENCOUNTER (OUTPATIENT)
Dept: PHYSICAL THERAPY | Age: 62
Discharge: HOME | End: 2022-06-09
Payer: COMMERCIAL

## 2022-06-09 PROCEDURE — 97110 THERAPEUTIC EXERCISES: CPT

## 2022-06-09 PROCEDURE — 97112 NEUROMUSCULAR REEDUCATION: CPT

## 2022-06-09 PROCEDURE — 97535 SELF CARE MNGMENT TRAINING: CPT

## 2022-06-09 NOTE — PROGRESS NOTES
PHYSICAL THERAPY - DAILY TREATMENT NOTE    Patient Name: Pratibha Carr        Date: 2022  : 1960   yes Patient  Verified  Visit #:      12  Insurance: Payor: Faizan  / Plan: Ruth Llanos / Product Type: PPO /      In time: 3:42 Out time: 4:31   Total Treatment Time: 49     Medicare/[employer] Time Tracking (below)   Total Timed Codes (min):  49 1:1 Treatment Time:  49     TREATMENT AREA =  Left knee pain [M25.562]    SUBJECTIVE  Pain Level (on 0 to 10 scale):  0  / 10   Medication Changes/New allergies or changes in medical history, any new surgeries or procedures? NO  If yes, update Summary List   Subjective Functional Status/Changes:  []  No changes reported     Reports he's noticing that the pistol squats were easier than they have been. Still has pain with descending stairs. Felt less sore after pickle ball. Didn't feel too bad after 1st bout of BFR, thought he would be more sore but felt ok. OBJECTIVE    Blood Flow Restriction Procedure Note    Blood Flow Restriction Session Number:  2    If applicable, were there any adverse reactions to the last blood flow restriction therapy session? no      Chart reviewed for the following:  IEmilia, have reviewed the medical history, summary list and precautions/contraindications for Pratibha Blend.     TIME OUT performed immediately prior to start of procedure:  4:31 PM (enter time the timeout was completed)  Emilia TRIVEDI, have performed the following reviews on Pratibha Blend prior to the start of the session:        [x] Patient was identified by name and date of birth    [x] Purpose of blood flow restriction therapy, side effects, possible complications, risks and benefits were explained to the patient   [x] Procedure site(s) verified  [x] Informed Consent was signed (initial visit) and verified verbally (subsequent visits)  [x] Patient was instructed on the need to report any new medical conditions, procedures or medications. [x] How to respond to possible adverse effects of treatment  [x] Opportunity was given to ask any questions, all questions were answered            Location(s) of blood flow restriction cuffs:   UE:   []  Right     []  Left             LE:    []  Right    [x]  Left           Pressure applied at the cuffs: 304 mm HG (80% of 380 mm Hg. 380 mm Hg is 100% occlusion)    Patients response to todays treatment:   [x]  General muscle soreness []  Numbness/tingling of extremity    []  Dizziness      []  Other:      [x] Skin assessment post-treatment:    [x]  intact    []  redness- no adverse reaction     []  redness - adverse reaction:        20 min Therapeutic Exercise:  [x]  See flow sheet   Rationale:      increase ROM, increase strength, improve coordination, improve balance and increase proprioception to improve the patients ability to return to PLOF     ND min Manual Therapy: L ankle ROM   Rationale:      increase ROM and increase tissue extensibility to improve patient's ability to DF ankle for improved stair descent. The manual therapy interventions were performed at a separate and distinct time from the therapeutic activities interventions. 19 min Neuromuscular Re-ed: [x]  See flow sheet   Rationale:    increase ROM, increase strength, improve coordination, improve balance and increase proprioception to improve the patients ability to facilitate coordinated muscular contractions for ease of movement. 10 min Self Care: BFR education and discussion of current POC, PT goals, personal goals, and interventions. Rationale:  to improve understanding of current diagnosis with realistic expectation of PT to improve compliance/adherence and satisfaction.     Billed With/As:   [x] TE   [] TA   [] Neuro   [] Self Care Patient Education: [x] Review HEP    [] Progressed/Changed HEP based on:   [] positioning   [] body mechanics   [] transfers   [] heat/ice application    [] other:      Other Objective/Functional Measures:    *continued exercises per flowsheet     Post Treatment Pain Level (on 0 to 10) scale:   1-2  / 10     ASSESSMENT  Assessment/Changes in Function:     Patient able to complete full BFR today without adverse effects. Noted \"burning\" pain in muscles, noting more muscle fatigue/\"working\" vs sharp pain or red flags. []  See Progress Note/Recertification   Patient will continue to benefit from skilled PT services to modify and progress therapeutic interventions, address functional mobility deficits, address ROM deficits, address strength deficits, analyze and cue movement patterns, analyze and modify body mechanics/ergonomics, assess and modify postural abnormalities and instruct in home and community integration to attain remaining goals. Progress toward goals / Updated goals: · Short Term Goals: To be accomplished in  3  weeks:  1. Independent with HEP to progress to meet goals. Progressing 06/09; reports HEP compliance  2. Pt to report decreased max pain levels less than 6/10 for improvement in ADLs. · Long Term Goals: To be accomplished in  6  weeks:  1. Improve FOTO score to 70/100 to show a significant functional change. 2. Pt to report decreased max pain levels less than 3/10 for improvement in QoL. 3. Pt to demonstrate step down on 6 inch stair with good+ control for ease of ADLs.        PLAN  [x]  Upgrade activities as tolerated yes Continue plan of care   []  Discharge due to :    []  Other:      Therapist: Kristal Espino    Date: 6/9/2022 Time: 7:44 PM     Future Appointments   Date Time Provider Brice Bush   6/9/2022  3:45 PM [facility]   6/13/2022  3:45 PM Jeancarlos Sanabria [facility]   6/15/2022  3:15 PM Jeancarlos Sanabria PT [facility]   6/20/2022  3:45 PM Jeancarlos Sanabria PT [facility]   6/23/2022  3:30 PM Jeancarlos Sanabria PT [facility]   6/27/2022  3:45 PM Jeancarlos Sanabria PT [facility]   6/30/2022  3:30 [facility]   7/5/2022  3:45 PM Lily Cochran, PT [facility]   7/7/2022  3:30 PM Lily Cochran, PT [facility]   7/11/2022  3:45 PM Shabana Ordoñez [facility]   7/14/2022  3:45 PM Emanuel Sims [phone]

## 2022-06-13 ENCOUNTER — HOSPITAL ENCOUNTER (OUTPATIENT)
Dept: PHYSICAL THERAPY | Age: 62
Discharge: HOME | End: 2022-06-13
Payer: COMMERCIAL

## 2022-06-13 PROCEDURE — 97110 THERAPEUTIC EXERCISES: CPT

## 2022-06-13 PROCEDURE — 97530 THERAPEUTIC ACTIVITIES: CPT

## 2022-06-13 NOTE — PROGRESS NOTES
PHYSICAL THERAPY - DAILY TREATMENT NOTE    Patient Name: Enrique Carroll        Date: 2022  : 1960   yes Patient  Verified  Visit #:      of   12  Insurance: Payor: Mark Araujo / Plan: Becca Sheridan / Product Type: PPO /      In time: 3:53 Out time: 4:39   Total Treatment Time: 46     Medicare/[facility] Time Tracking (below)   Total Timed Codes (min):  46 1:1 Treatment Time:  46     TREATMENT AREA =  Left knee pain [M25.562]    SUBJECTIVE  Pain Level (on 0 to 10 scale):  0  / 10   Medication Changes/New allergies or changes in medical history, any new surgeries or procedures? NO  If yes, update Summary List   Subjective Functional Status/Changes:  []  No changes reported     Notes the knee was fine after last session. OBJECTIVE    Blood Flow Restriction Procedure Note    Blood Flow Restriction Session Number:  3    If applicable, were there any adverse reactions to the last blood flow restriction therapy session? no      Chart reviewed for the following:  IMariana, PT, have reviewed the medical history, summary list and precautions/contraindications for Enrique Carroll. TIME OUT performed immediately prior to start of procedure:  4:00 PM (enter time the timeout was completed)  Mariana TRIVEDI, PT, have performed the following reviews on Enrique Carroll prior to the start of the session:        [x] Patient was identified by name and date of birth    [x] Purpose of blood flow restriction therapy, side effects, possible complications, risks and benefits were explained to the patient   [x] Procedure site(s) verified  [x] Informed Consent was signed (initial visit) and verified verbally (subsequent visits)  [x] Patient was instructed on the need to report any new medical conditions, procedures or medications.   [x] How to respond to possible adverse effects of treatment  [x] Opportunity was given to ask any questions, all questions were answered            Location(s) of blood flow restriction cuffs:   UE:   []  Right     []  Left             LE:    []  Right    [x]  Left           Pressure applied at the cuffs: 260 mm HG (80% of 325 mm Hg)    Patients response to todays treatment:   [x]  General muscle soreness []  Numbness/tingling of extremity    []  Dizziness      []  Other:      [x] Skin assessment post-treatment:    [x]  intact    []  redness- no adverse reaction     []  redness - adverse reaction:        36 min Therapeutic Exercise:  [x]  See flow sheet   Rationale:      increase ROM, increase strength, improve coordination, improve balance and increase proprioception to improve the patients ability to return to PLOF     ND min Manual Therapy: L ankle ROM   Rationale:      increase ROM and increase tissue extensibility to improve patient's ability to DF ankle for improved stair descent. The manual therapy interventions were performed at a separate and distinct time from the therapeutic activities interventions. 10 min Therapeutic Activity: [x]  See flow sheet   Rationale:    increase strength and improve coordination to improve the patients ability to perform transfers and ADLs. Billed With/As:   [x] TE   [] TA   [] Neuro   [] Self Care Patient Education: [x] Review HEP    [] Progressed/Changed HEP based on:   [] positioning   [] body mechanics   [] transfers   [] heat/ice application    [] other:      Other Objective/Functional Measures:    *continued exercises per flow sheet  *exercises performed on BFR     Post Treatment Pain Level (on 0 to 10) scale:   0  / 10     ASSESSMENT  Assessment/Changes in Function:     Added step downs today as this is a c/c. Pt able to tolerated 4inch step downs with good control throughout. Also performed SLR, HSC, and clams under BFR.  Ended with MWM for L ankle and instructed pt on how to perform at [facility].     []  See Progress Note/Recertification   Patient will continue to benefit from skilled PT services to modify and progress therapeutic interventions, address functional mobility deficits, address ROM deficits, address strength deficits, analyze and cue movement patterns, analyze and modify body mechanics/ergonomics, assess and modify postural abnormalities and instruct in home and community integration to attain remaining goals. Progress toward goals / Updated goals: · Short Term Goals: To be accomplished in  3  weeks:  1. Independent with HEP to progress to meet goals. Progressing 06/09; reports HEP compliance  2. Pt to report decreased max pain levels less than 6/10 for improvement in ADLs. · Long Term Goals: To be accomplished in  6  weeks:  1. Improve FOTO score to 70/100 to show a significant functional change. 2. Pt to report decreased max pain levels less than 3/10 for improvement in QoL. 3. Pt to demonstrate step down on 6 inch stair with good+ control for ease of ADLs.        PLAN  [x]  Upgrade activities as tolerated yes Continue plan of care   []  Discharge due to :    []  Other:      Therapist: Justin Omer, PT, DPT    Date: 6/13/2022 Time: 7:44 PM     Future Appointments   Date Time Provider rBice Bush   6/13/2022  3:45 PM Diantha Silvius,  [address] [facility]   6/15/2022  3:15 PM Diantha Silvius,  [address] [facility]   6/20/2022  3:45 PM Diantha Silvius,  [address] [facility]   6/23/2022  3:30 PM Diantha Silvius,  [address] [facility]   6/27/2022  3:45 PM Diantha Silvius,  [address] [facility]   6/30/2022  3:30 PM Diantha Silvius,  [address] [facility]   7/5/2022  3:45 PM Diantha Silvius,  [address] [facility]   7/7/2022  3:30 PM Diantha Silvius,  [address] [facility]   7/11/2022  3:45 PM Rudolph Lazaro [facility]   7/14/2022  3:45 PM Dez Iniguez [facility]

## 2022-06-20 ENCOUNTER — HOSPITAL ENCOUNTER (OUTPATIENT)
Dept: PHYSICAL THERAPY | Age: 62
Discharge: HOME | End: 2022-06-20
Payer: COMMERCIAL

## 2022-06-20 PROCEDURE — 97110 THERAPEUTIC EXERCISES: CPT

## 2022-06-20 PROCEDURE — 97530 THERAPEUTIC ACTIVITIES: CPT

## 2022-06-20 NOTE — PROGRESS NOTES
[address] [facility] PHYSICAL THERAPY  [address] [place] [address] Suhas Wilkinsøthomas [facility] [address],[facility], [facility] - Phone: [phone]  Fax: [phone]  PROGRESS NOTE  Patient Name: Radha Moran : 1960   Treatment/Medical Diagnosis: Left knee pain [M25.562]   Referral Source: Kobe Maynard MD     Date of Initial Visit: 22 Attended Visits: 6 Missed Visits: 1     SUMMARY OF TREATMENT  Pt was seen for IE and 5 f/u visits with treatment consisting of manual therapy, therapeutic exercises, therapeutic activities, neuromuscular reeducation, and self care techniques to improve L knee strength and L ankle mobility along with instruction of HEP. CURRENT STATUS  Pt has made gradual progress in PT as demonstrated by decreased max pain levels at 7-8/10 (going down stairs) and average pain level of 3-4/10. Pt report 10% improvement since beginning therapy, however more recently started noticing improvements. Pt also reports he thinks he is recovering faster after playing pickleball and also thinks descending stairs is better, however continues to have pain with both activities. Pt notes they are completing their HEP at least once a day. Pt will continue to benefit from skilled PT to progress exercises and improve upon L knee strength and stability. Goal/Measure of Progress Goal Met? 1. Independent with HEP to progress to meet goals. Status at last Eval: Goal established Current Status: compliant yes   2. Pt to report decreased max pain levels less than 6/10 for improvement in ADLs. Status at last Eval: 8/10 Current Status: 7-8/10 no   3. Pt to demonstrate step down on 6 inch stair with good+ control for ease of ADLs   Status at last Eval: Goal established Current Status: Trial 1: fair+  Trial 2: good +  Trial 3: good Progressing well     New Goals to be achieved in __4__  weeks:  1. Improve FOTO score to 70/100 to show a significant functional change.   2. Pt to report decreased max pain levels less than 3/10 for improvement in QoL. 3. Pt to demonstrate good+ step down from 8\" step for improvement in ADLs. RECOMMENDATIONS  Continue with therapy 2x/week for 4 weeks to further improve upon L knee strength, stability, and functional activities. Please advise. Thank you. If you have any questions/comments please contact us directly at [phone]. Thank you for allowing us to assist in the care of your patient. Therapist Signature: Anel Dorsey PT, DPT Date: 6/20/2022     Time: 9:25 AM   NOTE TO PHYSICIAN:  PLEASE COMPLETE THE ORDERS BELOW AND FAX TO   [facility] Physical Therapy: [phone]  If you are unable to process this request in 24 hours please contact our office: [phone]    ___ I have read the above report and request that my patient continue as recommended.   ___ I have read the above report and request that my patient continue therapy with the following changes/special instructions:_________________________________________________________   ___ I have read the above report and request that my patient be discharged from therapy.      Physician Signature:        Date:       Time:                                 Lukasz Jeong MD

## 2022-06-20 NOTE — PROGRESS NOTES
PHYSICAL THERAPY - DAILY TREATMENT NOTE    Patient Name: Stephen Carvalho        Date: 2022  : 1960   yes Patient  Verified  Visit #:      12  Insurance: Payor: Fausto Galvan / Plan: [employer] / Product Type: PPO /      In time: 3:45 Out time: 4:25   Total Treatment Time: 40     Medicare/[facility] Time Tracking (below)   Total Timed Codes (min):  40 1:1 Treatment Time:  40     TREATMENT AREA =  Left knee pain [M25.562]    SUBJECTIVE  Pain Level (on 0 to 10 scale):  0  / 10   Medication Changes/New allergies or changes in medical history, any new surgeries or procedures? NO  If yes, update Summary List   Subjective Functional Status/Changes:  []  No changes reported     Notes he thinks the knee is recovering fast after playing pickle ball. OBJECTIVE    Blood Flow Restriction Procedure Note    Blood Flow Restriction Session Number:  4    If applicable, were there any adverse reactions to the last blood flow restriction therapy session? no      Chart reviewed for the following:  IAngelo PT, have reviewed the medical history, summary list and precautions/contraindications for Stephen Carvalho. TIME OUT performed immediately prior to start of procedure:  3:49 PM (enter time the timeout was completed)  Angelo TRIVEDI PT, have performed the following reviews on Stephen Carvalho prior to the start of the session:        [x] Patient was identified by name and date of birth    [x] Purpose of blood flow restriction therapy, side effects, possible complications, risks and benefits were explained to the patient   [x] Procedure site(s) verified  [x] Informed Consent was signed (initial visit) and verified verbally (subsequent visits)  [x] Patient was instructed on the need to report any new medical conditions, procedures or medications.   [x] How to respond to possible adverse effects of treatment  [x] Opportunity was given to ask any questions, all questions were answered Location(s) of blood flow restriction cuffs:   UE:   []  Right     []  Left             LE:    []  Right    [x]  Left           Pressure applied at the cuffs: 296 mm HG (80% of 370 mm Hg)    Patients response to todays treatment:   [x]  General muscle soreness []  Numbness/tingling of extremity    []  Dizziness      []  Other:      [x] Skin assessment post-treatment:    [x]  intact    []  redness- no adverse reaction     []  redness - adverse reaction:        30 min Therapeutic Exercise:  [x]  See flow sheet   Rationale:      increase ROM, increase strength, improve coordination, improve balance and increase proprioception to improve the patients ability to return to PLOF     ND min Manual Therapy: L ankle ROM   Rationale:      increase ROM and increase tissue extensibility to improve patient's ability to DF ankle for improved stair descent. The manual therapy interventions were performed at a separate and distinct time from the therapeutic activities interventions. 10 min Therapeutic Activity: [x]  See flow sheet   Rationale:    increase strength and improve coordination to improve the patients ability to perform transfers and ADLs.     Billed With/As:   [x] TE   [] TA   [] Neuro   [] Self Care Patient Education: [x] Review HEP    [] Progressed/Changed HEP based on:   [] positioning   [] body mechanics   [] transfers   [] heat/ice application    [] other:      Other Objective/Functional Measures:    *continued exercises per flow sheet  *exercises performed on BFR     Post Treatment Pain Level (on 0 to 10) scale:   0  / 10     ASSESSMENT  Assessment/Changes in Function:     See PN     [x]  See Progress Note/Recertification   Patient will continue to benefit from skilled PT services to modify and progress therapeutic interventions, address functional mobility deficits, address ROM deficits, address strength deficits, analyze and cue movement patterns, analyze and modify body mechanics/ergonomics, assess and modify postural abnormalities and instruct in home and community integration to attain remaining goals.    Progress toward goals / Updated goals:    See PN       PLAN  [x]  Upgrade activities as tolerated yes Continue plan of care   []  Discharge due to :    []  Other:      Therapist: Jessika Amaral, PT, DPT    Date: 6/20/2022 Time: 7:44 PM     Future Appointments   Date Time Provider Brice Bush   6/23/2022  3:30 PM French Juarez, PT [facility]   6/27/2022  3:45 PM French Juarez, PT [facility]   6/30/2022  3:30 PM French Juarez, PT [facility]   7/5/2022  3:45 PM French Juarez, PT [facility]   7/7/2022  3:30 PM French Juarez, PT [facility]   7/11/2022  3:45 PM Javon Boyle [facility]   7/14/2022  3:45 PM Yudelka Thompson [facility]

## 2022-06-23 ENCOUNTER — HOSPITAL ENCOUNTER (OUTPATIENT)
Dept: PHYSICAL THERAPY | Age: 62
Discharge: HOME | End: 2022-06-23
Payer: COMMERCIAL

## 2022-06-23 PROCEDURE — 97140 MANUAL THERAPY 1/> REGIONS: CPT

## 2022-06-23 PROCEDURE — 97110 THERAPEUTIC EXERCISES: CPT

## 2022-06-23 PROCEDURE — 97530 THERAPEUTIC ACTIVITIES: CPT

## 2022-06-23 NOTE — PROGRESS NOTES
PHYSICAL THERAPY - DAILY TREATMENT NOTE    Patient Name: Pratibha Carr        Date: 2022  : 1960   yes Patient  Verified  Visit #:      12  Insurance: Payor: Faizan Lion / Plan: Ruth Llanos / Product Type: PPO /      In time: 3:44 Out time: 4:37   Total Treatment Time: 53     Medicare/[employer] Time Tracking (below)   Total Timed Codes (min):  53 1:1 Treatment Time:  53     TREATMENT AREA =  Left knee pain [M25.562]    SUBJECTIVE  Pain Level (on 0 to 10 scale):  2  / 10   Medication Changes/New allergies or changes in medical history, any new surgeries or procedures? NO  If yes, update Summary List   Subjective Functional Status/Changes:  []  No changes reported     Have some pain I think because of the weather. OBJECTIVE    Blood Flow Restriction Procedure Note    Blood Flow Restriction Session Number:  5    If applicable, were there any adverse reactions to the last blood flow restriction therapy session? no      Chart reviewed for the following:  IConstance PT, have reviewed the medical history, summary list and precautions/contraindications for Pratibha Carr. TIME OUT performed immediately prior to start of procedure:  3:50 PM (enter time the timeout was completed)  Constance TRIVEDI PT, have performed the following reviews on Pratibha Carr prior to the start of the session:        [x] Patient was identified by name and date of birth    [x] Purpose of blood flow restriction therapy, side effects, possible complications, risks and benefits were explained to the patient   [x] Procedure site(s) verified  [x] Informed Consent was signed (initial visit) and verified verbally (subsequent visits)  [x] Patient was instructed on the need to report any new medical conditions, procedures or medications.   [x] How to respond to possible adverse effects of treatment  [x] Opportunity was given to ask any questions, all questions were answered            Location(s) of blood flow restriction cuffs:   UE:   []  Right     []  Left             LE:    []  Right    [x]  Left           Pressure applied at the cuffs: 240 mm HG (80% of 300 mm Hg)    Patients response to todays treatment:   [x]  General muscle soreness []  Numbness/tingling of extremity    []  Dizziness      []  Other:      [x] Skin assessment post-treatment:    [x]  intact    []  redness- no adverse reaction     []  redness - adverse reaction:        37 min Therapeutic Exercise:  [x]  See flow sheet   Rationale:      increase ROM, increase strength, improve coordination, improve balance and increase proprioception to improve the patients ability to return to PLOF     8 min Manual Therapy: L ankle ROM, and forefoot mobs; MWM for ankle DF   Rationale:      increase ROM and increase tissue extensibility to improve patient's ability to DF ankle for improved stair descent. The manual therapy interventions were performed at a separate and distinct time from the therapeutic activities interventions. 8 min Therapeutic Activity: [x]  See flow sheet   Rationale:    increase strength and improve coordination to improve the patients ability to perform transfers and ADLs. Billed With/As:   [x] TE   [] TA   [] Neuro   [] Self Care Patient Education: [x] Review HEP    [] Progressed/Changed HEP based on:   [] positioning   [] body mechanics   [] transfers   [] heat/ice application    [] other:      Other Objective/Functional Measures:    *continued exercises per flow sheet  *exercises performed on BFR     Post Treatment Pain Level (on 0 to 10) scale:   0  / 10     ASSESSMENT  Assessment/Changes in Function:     Initiated session with ankle mobs and MWM f/b BFR training. Pt tolerated session well, noting decreased pain post session. Added leg press this date which was a challenge to pt, however pt able to complete full protocol with 60#. Will continue to progress as tolerated.      []  See Progress Note/Recertification   Patient will continue to benefit from skilled PT services to modify and progress therapeutic interventions, address functional mobility deficits, address ROM deficits, address strength deficits, analyze and cue movement patterns, analyze and modify body mechanics/ergonomics, assess and modify postural abnormalities and instruct in home and community integration to attain remaining goals. Progress toward goals / Updated goals:    1. Improve FOTO score to 70/100 to show a significant functional change. 2. Pt to report decreased max pain levels less than 3/10 for improvement in QoL. Progressing 6/23/22  3. Pt to demonstrate good+ step down from 8\" step for improvement in ADLs.  Progressing daily 6/23/22       PLAN  [x]  Upgrade activities as tolerated yes Continue plan of care   []  Discharge due to :    []  Other:      Therapist: Felisa Hahn PT, DPT    Date: 6/23/2022 Time: 7:44 PM     Future Appointments   Date Time Provider Brice Bush   6/23/2022  3:30 PM Della Guerrero, PT [facility]   6/27/2022  3:45 PM Della Guerrero, PT [facility]   6/30/2022  3:30 PM Della Guerrero, PT [facility]   7/5/2022  3:45 PM Della Guerrero, PT [facility]   7/7/2022  3:30 PM Della Guerrero, PT [facility]   7/11/2022  3:45 PM Dejon Hartmann [facility]   7/14/2022  3:45 PM Samira Felix [facility]

## 2022-06-27 ENCOUNTER — HOSPITAL ENCOUNTER (OUTPATIENT)
Dept: PHYSICAL THERAPY | Age: 62
Discharge: HOME | End: 2022-06-27
Payer: COMMERCIAL

## 2022-06-27 PROCEDURE — 97530 THERAPEUTIC ACTIVITIES: CPT

## 2022-06-27 PROCEDURE — 97110 THERAPEUTIC EXERCISES: CPT

## 2022-06-27 NOTE — PROGRESS NOTES
PHYSICAL THERAPY - DAILY TREATMENT NOTE    Patient Name: Angeles Cedillo        Date: 2022  : 1960   yes Patient  Verified  Visit #:   8   of   20  Insurance: Payor: Eris Patel / Plan: Allyson Barbosa / Product Type: PPO /      In time: 3:44 Out time: 4:27   Total Treatment Time: 43     Medicare/[employer] Time Tracking (below)   Total Timed Codes (min):  43 1:1 Treatment Time:  43     TREATMENT AREA =  Left knee pain [M25.562]    SUBJECTIVE  Pain Level (on 0 to 10 scale):  0  / 10   Medication Changes/New allergies or changes in medical history, any new surgeries or procedures? NO  If yes, update Summary List   Subjective Functional Status/Changes:  []  No changes reported     No pain unless I go down stairs. I can't tell if that's getting better or not. OBJECTIVE    Blood Flow Restriction Procedure Note    Blood Flow Restriction Session Number:  6    If applicable, were there any adverse reactions to the last blood flow restriction therapy session? no      Chart reviewed for the following:  IDesirae PT, have reviewed the medical history, summary list and precautions/contraindications for Angeles Cedillo. TIME OUT performed immediately prior to start of procedure:  3:48 PM (enter time the timeout was completed)  Desirae TRIVEDI PT, have performed the following reviews on Angeles Cedillo prior to the start of the session:        [x] Patient was identified by name and date of birth    [x] Purpose of blood flow restriction therapy, side effects, possible complications, risks and benefits were explained to the patient   [x] Procedure site(s) verified  [x] Informed Consent was signed (initial visit) and verified verbally (subsequent visits)  [x] Patient was instructed on the need to report any new medical conditions, procedures or medications.   [x] How to respond to possible adverse effects of treatment  [x] Opportunity was given to ask any questions, all questions were answered Location(s) of blood flow restriction cuffs:   UE:   []  Right     []  Left             LE:    []  Right    [x]  Left           Pressure applied at the cuffs: 320 mm HG (80% of 400 mm Hg)    Patients response to todays treatment:   [x]  General muscle soreness []  Numbness/tingling of extremity    []  Dizziness      []  Other:      [x] Skin assessment post-treatment:    [x]  intact    []  redness- no adverse reaction     []  redness - adverse reaction:        18 min Therapeutic Exercise:  [x]  See flow sheet   Rationale:      increase ROM, increase strength, improve coordination, improve balance and increase proprioception to improve the patients ability to return to PLOF     ND min Manual Therapy: L ankle ROM, and forefoot mobs; MWM for ankle DF   Rationale:      increase ROM and increase tissue extensibility to improve patient's ability to DF ankle for improved stair descent. The manual therapy interventions were performed at a separate and distinct time from the therapeutic activities interventions. 25 min Therapeutic Activity: [x]  See flow sheet   Rationale:    increase strength and improve coordination to improve the patients ability to perform transfers and ADLs. Billed With/As:   [x] TE   [] TA   [] Neuro   [] Self Care Patient Education: [x] Review HEP    [] Progressed/Changed HEP based on:   [] positioning   [] body mechanics   [] transfers   [] heat/ice application    [] other:      Other Objective/Functional Measures:    *continued exercises per flow sheet  *exercises performed on BFR     Post Treatment Pain Level (on 0 to 10) scale:   0  / 10     ASSESSMENT  Assessment/Changes in Function:     Added TKE under BFR to improve WBing quad stability. Pt tolerated session well. Challenged with standing TKE this date.      []  See Progress Note/Recertification   Patient will continue to benefit from skilled PT services to modify and progress therapeutic interventions, address functional mobility deficits, address ROM deficits, address strength deficits, analyze and cue movement patterns, analyze and modify body mechanics/ergonomics, assess and modify postural abnormalities and instruct in home and community integration to attain remaining goals. Progress toward goals / Updated goals:    1. Improve FOTO score to 70/100 to show a significant functional change. 2. Pt to report decreased max pain levels less than 3/10 for improvement in QoL. Progressing 6/27/22  3. Pt to demonstrate good+ step down from 8\" step for improvement in ADLs.  Progressing daily 6/23/22       PLAN  [x]  Upgrade activities as tolerated yes Continue plan of care   []  Discharge due to :    []  Other:      Therapist: Corinne Honeycutt PT, DPT    Date: 6/27/2022 Time: 7:44 PM     Future Appointments   Date Time Provider Brice Bush   6/27/2022  3:45 PM Christina Barron, PT [facility]   6/30/2022  3:30 PM Christina Barron, PT [facility]   7/5/2022  3:45 PM Christina Barron, PT [facility]   7/7/2022  3:30 PM Christina Barron, PT [facility]   7/11/2022  3:45 PM Trinidad Briscoe [facility]   7/14/2022  3:45 PM Myriam Mata [facility]

## 2022-06-30 ENCOUNTER — HOSPITAL ENCOUNTER (OUTPATIENT)
Dept: PHYSICAL THERAPY | Age: 62
Discharge: HOME | End: 2022-06-30
Payer: COMMERCIAL

## 2022-06-30 PROCEDURE — 97110 THERAPEUTIC EXERCISES: CPT

## 2022-06-30 PROCEDURE — 97530 THERAPEUTIC ACTIVITIES: CPT

## 2022-06-30 NOTE — PROGRESS NOTES
PHYSICAL THERAPY - DAILY TREATMENT NOTE    Patient Name: Michelle Antis        Date: 2022  : 1960   yes Patient  Verified  Visit #:     Insurance: Payor: Nuzhat Brush / Plan: [employer] / Product Type: PPO /      In time: 3:32 Out time: 4:19   Total Treatment Time: 47     Medicare/[facility] Time Tracking (below)   Total Timed Codes (min):  47 1:1 Treatment Time:  47     TREATMENT AREA =  Left knee pain [M25.562]    SUBJECTIVE  Pain Level (on 0 to 10 scale):  2  / 10   Medication Changes/New allergies or changes in medical history, any new surgeries or procedures? NO  If yes, update Summary List   Subjective Functional Status/Changes:  []  No changes reported     Played pickleball at 5am this morning and it's still sore. It is recovering faster than it was. Usually i'm sore for 24hrs after playing pickleball. OBJECTIVE    Blood Flow Restriction Procedure Note    Blood Flow Restriction Session Number:  7    If applicable, were there any adverse reactions to the last blood flow restriction therapy session? no      Chart reviewed for the following:  IHenok PT, have reviewed the medical history, summary list and precautions/contraindications for Michelle Antis. TIME OUT performed immediately prior to start of procedure:  3:48 PM (enter time the timeout was completed)  Henok TRIVEDI PT, have performed the following reviews on Michelle Antis prior to the start of the session:        [x] Patient was identified by name and date of birth    [x] Purpose of blood flow restriction therapy, side effects, possible complications, risks and benefits were explained to the patient   [x] Procedure site(s) verified  [x] Informed Consent was signed (initial visit) and verified verbally (subsequent visits)  [x] Patient was instructed on the need to report any new medical conditions, procedures or medications.   [x] How to respond to possible adverse effects of treatment  [x] Opportunity was given to ask any questions, all questions were answered            Location(s) of blood flow restriction cuffs:   UE:   []  Right     []  Left             LE:    []  Right    [x]  Left           Pressure applied at the cuffs: 240 mm HG (80% of 300 mm Hg)    Patients response to todays treatment:   [x]  General muscle soreness []  Numbness/tingling of extremity    []  Dizziness      []  Other:      [x] Skin assessment post-treatment:    [x]  intact    []  redness- no adverse reaction     []  redness - adverse reaction:        23 min Therapeutic Exercise:  [x]  See flow sheet   Rationale:      increase ROM, increase strength, improve coordination, improve balance and increase proprioception to improve the patients ability to return to PLOF     ND min Manual Therapy: L ankle ROM, and forefoot mobs; MWM for ankle DF   Rationale:      increase ROM and increase tissue extensibility to improve patient's ability to DF ankle for improved stair descent. The manual therapy interventions were performed at a separate and distinct time from the therapeutic activities interventions. 24 min Therapeutic Activity: [x]  See flow sheet   Rationale:    increase strength and improve coordination to improve the patients ability to perform transfers and ADLs. Billed With/As:   [x] TE   [] TA   [] Neuro   [] Self Care Patient Education: [x] Review HEP    [] Progressed/Changed HEP based on:   [] positioning   [] body mechanics   [] transfers   [] heat/ice application    [] other:      Other Objective/Functional Measures:    *continued exercises per flow sheet  *exercises performed on BFR     Post Treatment Pain Level (on 0 to 10) scale:   0  / 10     ASSESSMENT  Assessment/Changes in Function:     Decreased soreness post session. No adverse reactions.  Advised pt that swelling while he is sitting in his truck at this point is more abnormal. Will write PN next week to MD.     []  See Progress Note/Recertification Patient will continue to benefit from skilled PT services to modify and progress therapeutic interventions, address functional mobility deficits, address ROM deficits, address strength deficits, analyze and cue movement patterns, analyze and modify body mechanics/ergonomics, assess and modify postural abnormalities and instruct in home and community integration to attain remaining goals. Progress toward goals / Updated goals:    1. Improve FOTO score to 70/100 to show a significant functional change. 2. Pt to report decreased max pain levels less than 3/10 for improvement in QoL. Progressing 6/27/22  3. Pt to demonstrate good+ step down from 8\" step for improvement in ADLs.  Progressing daily 6/23/22       PLAN  [x]  Upgrade activities as tolerated yes Continue plan of care   []  Discharge due to :    []  Other:      Therapist: Jayy Tavares PT, DPT    Date: 6/30/2022 Time: 7:44 PM     Future Appointments   Date Time Provider Brice Bush   6/30/2022  3:30 PM Megan Angel,  [address] [facility]   7/5/2022  3:45 PM Megan Angel,  [address] [facility]   7/7/2022  3:30 PM Megan Angel,  [address] [facility]   7/11/2022  3:45 PM Chester Goldstein,  [address] [facility]   7/14/2022  3:45 PM Karo Shaikh [address] [facility]

## 2022-07-05 ENCOUNTER — HOSPITAL ENCOUNTER (OUTPATIENT)
Dept: PHYSICAL THERAPY | Age: 62
Discharge: HOME | End: 2022-07-05
Payer: COMMERCIAL

## 2022-07-05 PROCEDURE — 97530 THERAPEUTIC ACTIVITIES: CPT

## 2022-07-05 PROCEDURE — 97110 THERAPEUTIC EXERCISES: CPT

## 2022-07-05 NOTE — PROGRESS NOTES
PHYSICAL THERAPY - DAILY TREATMENT NOTE    Patient Name: Kendall Roy        Date: 2022  : 1960   yes Patient  Verified  Visit #:   10   of   20  Insurance: Payor: Kylah Aceves / Plan: Haris Gonzales / Product Type: PPO /      In time: 3:53 Out time: 4:32   Total Treatment Time: 39     Medicare/[facility] Time Tracking (below)   Total Timed Codes (min):  39 1:1 Treatment Time:  39     TREATMENT AREA =  Left knee pain [M25.562]    SUBJECTIVE  Pain Level (on 0 to 10 scale):  1  / 10   Medication Changes/New allergies or changes in medical history, any new surgeries or procedures? NO  If yes, update Summary List   Subjective Functional Status/Changes:  []  No changes reported     Reports his knee was sore for a couple days after last session. Only played pickleball once since last visit. Thinks since he played pickleball Tuesday night and Wednesday morning that made it really sore. OBJECTIVE    Blood Flow Restriction Procedure Note    Blood Flow Restriction Session Number:  8    If applicable, were there any adverse reactions to the last blood flow restriction therapy session? no      Chart reviewed for the following:  Gris TRIVEDI, PT, have reviewed the medical history, summary list and precautions/contraindications for Kendall Leeut. TIME OUT performed immediately prior to start of procedure:  3:56 PM (enter time the timeout was completed)  Gris TRIVEDI, PT, have performed the following reviews on Kendall Roy prior to the start of the session:        [x] Patient was identified by name and date of birth    [x] Purpose of blood flow restriction therapy, side effects, possible complications, risks and benefits were explained to the patient   [x] Procedure site(s) verified  [x] Informed Consent was signed (initial visit) and verified verbally (subsequent visits)  [x] Patient was instructed on the need to report any new medical conditions, procedures or medications.   [x] How to respond to possible adverse effects of treatment  [x] Opportunity was given to ask any questions, all questions were answered            Location(s) of blood flow restriction cuffs:   UE:   []  Right     []  Left             LE:    []  Right    [x]  Left           Pressure applied at the cuffs: 272 mm HG (80% of 340 mm Hg)    Patients response to todays treatment:   [x]  General muscle soreness []  Numbness/tingling of extremity    []  Dizziness      []  Other:      [x] Skin assessment post-treatment:    [x]  intact    []  redness- no adverse reaction     []  redness - adverse reaction:        29 min Therapeutic Exercise:  [x]  See flow sheet   Rationale:      increase ROM, increase strength, improve coordination, improve balance and increase proprioception to improve the patients ability to return to PLOF     10 min Therapeutic Activity: [x]  See flow sheet   Rationale:    increase strength and improve coordination to improve the patients ability to perform transfers and ADLs. Billed With/As:   [x] TE   [] TA   [] Neuro   [] Self Care Patient Education: [x] Review HEP    [] Progressed/Changed HEP based on:   [] positioning   [] body mechanics   [] transfers   [] heat/ice application    [] other:      Other Objective/Functional Measures:    *continued exercises per flow sheet  *exercises performed on BFR     Post Treatment Pain Level (on 0 to 10) scale:   0  / 10     ASSESSMENT  Assessment/Changes in Function:     Continued with BFR; held on step downs due to increase in soreness post session last visit.  Will progress as tolerated and suggest MD follow up at next PN.     []  See Progress Note/Recertification   Patient will continue to benefit from skilled PT services to modify and progress therapeutic interventions, address functional mobility deficits, address ROM deficits, address strength deficits, analyze and cue movement patterns, analyze and modify body mechanics/ergonomics, assess and modify postural abnormalities and instruct in home and community integration to attain remaining goals. Progress toward goals / Updated goals:    1. Improve FOTO score to 70/100 to show a significant functional change. 2. Pt to report decreased max pain levels less than 3/10 for improvement in QoL. Progressing 6/27/22  3. Pt to demonstrate good+ step down from 8\" step for improvement in ADLs. Progressing daily 6/23/22    No progress towards goals this date.      PLAN  [x]  Upgrade activities as tolerated yes Continue plan of care   []  Discharge due to :    []  Other:      Therapist: Jayy Tavares, PT, DPT    Date: 7/5/2022 Time: 7:44 PM     Future Appointments   Date Time Provider Brice Bush   7/5/2022  3:45 PM Megan Angel, PT [facility]   7/7/2022  3:30 PM Megan Angel, PT [facility]   7/11/2022  3:45 PM Chester Goldstein, ENEDINA [facility]   7/14/2022  3:45 PM Karo Shaikh [facility]

## 2022-07-07 ENCOUNTER — HOSPITAL ENCOUNTER (OUTPATIENT)
Dept: PHYSICAL THERAPY | Age: 62
Discharge: HOME | End: 2022-07-07
Payer: COMMERCIAL

## 2022-07-07 PROCEDURE — 97110 THERAPEUTIC EXERCISES: CPT

## 2022-07-07 NOTE — PROGRESS NOTES
[address] [facility] PHYSICAL THERAPY  [address],[facility], [address] [facility] - Phone: [phone]  Fax: [phone]  PROGRESS NOTE  Patient Name: Tiffani Hughes : 1960   Treatment/Medical Diagnosis: Left knee pain [M25.562]   Referral Source: Delano Myers MD     Date of Initial Visit: 22 Attended Visits: 11 Missed Visits: 1     SUMMARY OF TREATMENT  Pt was seen for IE and 10 f/u visits with treatment consisting of manual therapy, therapeutic exercises, therapeutic activities, neuromuscular reeducation, and self care techniques to improve L knee strength and L ankle mobility along with instruction of HEP. CURRENT STATUS  Pt has made good progress with strength in PT. Continues to note max pain levels at 7-8/10 (going down stairs and when driving) and average pain level of 3-4/10. Pt reports no improvement in pain levels or edema since beginning therapy, however does note he thinks he is recovering faster after playing pickleball. Pt notes they are completing their HEP at least once a day. Pt will continue to benefit from skilled PT to improve upon L knee strength and stability for remainder of POC and then will be DC with I HEP. I did recommend he follow up with MD for additional consult and potential for additional imaging since improving strength has not improved his chief complaint of pain. Goal/Measure of Progress Goal Met? 1. Improve FOTO score to 70/100 to show a significant functional change. Status at last Eval: 52 Current Status: 61/100 progressing   2. Pt to report decreased max pain levels less than 6/10 for improvement in ADLs. Status at last Eval: 7-8/10 Current Status: 8/10 no   3. Pt to demonstrate step down on 8 inch stair with good+ control for ease of ADLs   Status at last Eval: Trial 1: fair+  Trial 2: good +  Trial 3: good Current Status: Good  Near Met     New Goals to be achieved in __2__  treatments:  1.  Continue with unmet goals above. RECOMMENDATIONS  Continue with therapy 2x/week for 1 week to further improve upon L knee strength, stability, and functional activities, then DC with I HEP. If you have any questions/comments please contact us directly at [phone]. Thank you for allowing us to assist in the care of your patient. Therapist Signature: Sirena Vidales PT, DPT Date: 7/7/2022     Time: 9:25 AM   NOTE TO PHYSICIAN:  PLEASE COMPLETE THE ORDERS BELOW AND FAX TO   [facility]: [phone]  If you are unable to process this request in 24 hours please contact our office: [phone]    ___ I have read the above report and request that my patient continue as recommended.   ___ I have read the above report and request that my patient continue therapy with the following changes/special instructions:_________________________________________________________   ___ I have read the above report and request that my patient be discharged from therapy.      Physician Signature:        Date:       Time:                                 Iglesia Johnson MD

## 2022-07-07 NOTE — PROGRESS NOTES
PHYSICAL THERAPY - DAILY TREATMENT NOTE    Patient Name: Jakob Bennett        Date: 2022  : 1960   yes Patient  Verified  Visit #:     Insurance: Payor: Hien Rojo / Plan: Johanny Rock / Product Type: PPO /      In time: 3:31 Out time: 4:16   Total Treatment Time: 45     Medicare/BCStuffle Time Tracking (below)   Total Timed Codes (min):  45 1:1 Treatment Time:  45     TREATMENT AREA =  Left knee pain [M25.562]    SUBJECTIVE  Pain Level (on 0 to 10 scale):  2-3  / 10   Medication Changes/New allergies or changes in medical history, any new surgeries or procedures? NO  If yes, update Summary List   Subjective Functional Status/Changes:  []  No changes reported     Notes he played pickleball and played well. Knee bothering him a little now. Started taking tumeric this morning. OBJECTIVE    Blood Flow Restriction Procedure Note    Blood Flow Restriction Session Number:  9    If applicable, were there any adverse reactions to the last blood flow restriction therapy session? no      Chart reviewed for the following:  IKay, PT, have reviewed the medical history, summary list and precautions/contraindications for Jakob Bennett. TIME OUT performed immediately prior to start of procedure:  3:36 PM (enter time the timeout was completed)  Kay TRIVEDI PT, have performed the following reviews on Jakob Bennett prior to the start of the session:        [x] Patient was identified by name and date of birth    [x] Purpose of blood flow restriction therapy, side effects, possible complications, risks and benefits were explained to the patient   [x] Procedure site(s) verified  [x] Informed Consent was signed (initial visit) and verified verbally (subsequent visits)  [x] Patient was instructed on the need to report any new medical conditions, procedures or medications.   [x] How to respond to possible adverse effects of treatment  [x] Opportunity was given to ask any questions, all questions were answered            Location(s) of blood flow restriction cuffs:   UE:   []  Right     []  Left             LE:    []  Right    [x]  Left           Pressure applied at the cuffs: 312 mm HG (80% of 390 mm Hg)    Patients response to todays treatment:   [x]  General muscle soreness []  Numbness/tingling of extremity    []  Dizziness      []  Other:      [x] Skin assessment post-treatment:    [x]  intact    []  redness- no adverse reaction     []  redness - adverse reaction:        45 min Therapeutic Exercise:  [x]  See flow sheet   Rationale:      increase ROM, increase strength, improve coordination, improve balance and increase proprioception to improve the patients ability to return to PLOF     ND min Therapeutic Activity: [x]  See flow sheet   Rationale:    increase strength and improve coordination to improve the patients ability to perform transfers and ADLs. Billed With/As:   [x] TE   [] TA   [] Neuro   [] Self Care Patient Education: [x] Review HEP    [] Progressed/Changed HEP based on:   [] positioning   [] body mechanics   [] transfers   [] heat/ice application    [] other:      Other Objective/Functional Measures:    *continued exercises per flow sheet  *exercises performed on BFR     Post Treatment Pain Level (on 0 to 10) scale:   0  / 10     ASSESSMENT  Assessment/Changes in Function:     See PN     [x]  See Progress Note/Recertification   Patient will continue to benefit from skilled PT services to modify and progress therapeutic interventions, address functional mobility deficits, address ROM deficits, address strength deficits, analyze and cue movement patterns, analyze and modify body mechanics/ergonomics, assess and modify postural abnormalities and instruct in home and community integration to attain remaining goals. Progress toward goals / Updated goals:    1. Improve FOTO score to 70/100 to show a significant functional change.   2. Pt to report decreased max pain levels less than 3/10 for improvement in QoL. Progressing 6/27/22  3. Pt to demonstrate good+ step down from 8\" step for improvement in ADLs.  Progressing daily 6/23/22       PLAN  [x]  Upgrade activities as tolerated yes Continue plan of care   []  Discharge due to :    []  Other:      Therapist: Hailee Asher, PT, DPT    Date: 7/7/2022 Time: 7:44 PM     Future Appointments   Date Time Provider Brice Bush   7/7/2022  3:30 PM Shaniqua Whalen,  [address] [facility]   7/11/2022  3:45 PM Mallorie Valdez [address] [facility]   7/14/2022  3:45 PM Devon Reyes

## 2022-07-11 ENCOUNTER — APPOINTMENT (OUTPATIENT)
Dept: PHYSICAL THERAPY | Age: 62
End: 2022-07-11
Payer: COMMERCIAL

## 2022-07-14 ENCOUNTER — APPOINTMENT (OUTPATIENT)
Dept: PHYSICAL THERAPY | Age: 62
End: 2022-07-14
Payer: COMMERCIAL

## 2022-08-15 NOTE — PROGRESS NOTES
[facility] PHYSICAL THERAPY  [address] [place] [address] [address] [facility] [address],[place], [address] [facility] - Phone: [phone]  Fax: [phone]  DISCHARGE NOTE  Patient Name: Jourdan Alvarado : 1960   Treatment/Medical Diagnosis: Left knee pain [M25.562]   Referral Source: Terrance Gamez MD     Date of Initial Visit: 22 Attended Visits: 11 Missed Visits: 1     SUMMARY OF TREATMENT  Pt was seen for IE and 10 f/u visits with treatment consisting of manual therapy, therapeutic exercises, therapeutic activities, neuromuscular reeducation, and self care techniques to improve L knee strength and L ankle mobility along with instruction of HEP. CURRENT STATUS  Pt was not seen after last PN on 22 and then did not return following their visit on 22. They will be DC at this time due to not returning. Below are comments and goals from last PN. Pt has made good progress with strength in PT. Continues to note max pain levels at 7-8/10 (going down stairs and when driving) and average pain level of 3-4/10. Pt reports no improvement in pain levels or edema since beginning therapy, however does note he thinks he is recovering faster after playing pickleball. Pt notes they are completing their HEP at least once a day. Pt will continue to benefit from skilled PT to improve upon L knee strength and stability for remainder of POC and then will be DC with I HEP. I did recommend he follow up with MD for additional consult and potential for additional imaging since improving strength has not improved his chief complaint of pain. Goal/Measure of Progress Goal Met? 1. Improve FOTO score to 70/100 to show a significant functional change. Status at last Eval: 52 Current Status: 61/100 progressing   2. Pt to report decreased max pain levels less than 6/10 for improvement in ADLs. Status at last Eval: 7-8/10 Current Status: 8/10 no   3.   Pt to demonstrate step down on 8 inch stair with good+ control for ease of ADLs   Status at last Eval: Trial 1: fair+  Trial 2: good +  Trial 3: good Current Status: Good  Near Met     RECOMMENDATIONS  DC due to not returning. If you have any questions/comments please contact us directly at [phone]. Thank you for allowing us to assist in the care of your patient. Therapist Signature: Carl Man PT, DPT Date: 8/15/2022     Time: 9:25 AM   NOTE TO PHYSICIAN:  PLEASE COMPLETE THE ORDERS BELOW AND FAX TO   [facility] [place] [facility]: [phone]  If you are unable to process this request in 24 hours please contact our office: [phone]    ___ I have read the above report and request that my patient continue as recommended.   ___ I have read the above report and request that my patient continue therapy with the following changes/special instructions:_________________________________________________________   ___ I have read the above report and request that my patient be discharged from therapy.      Physician Signature:        Date:       Time:                                 Terrance Gamez MD

## 2023-01-23 ENCOUNTER — HOSPITAL ENCOUNTER (OUTPATIENT)
Dept: LAB | Age: 63
Discharge: HOME | End: 2023-01-23

## 2023-01-23 ENCOUNTER — HOSPITAL ENCOUNTER (OUTPATIENT)
Dept: NON INVASIVE DIAGNOSTICS | Age: 63
Discharge: HOME | End: 2023-01-23
Payer: COMMERCIAL

## 2023-01-23 ENCOUNTER — TRANSCRIBE ORDER (OUTPATIENT)
Dept: REGISTRATION | Age: 63
End: 2023-01-23

## 2023-01-23 DIAGNOSIS — Q74.1 BIFID PATELLA: ICD-10-CM

## 2023-01-23 DIAGNOSIS — Q74.1 BIFID PATELLA: Primary | ICD-10-CM

## 2023-01-23 LAB
ATRIAL RATE: 53 BPM
CALCULATED P AXIS, ECG09: 30 DEGREES
CALCULATED R AXIS, ECG10: 41 DEGREES
CALCULATED T AXIS, ECG11: 32 DEGREES
DIAGNOSIS, 93000: NORMAL
P-R INTERVAL, ECG05: 152 MS
Q-T INTERVAL, ECG07: 398 MS
QRS DURATION, ECG06: 92 MS
QTC CALCULATION (BEZET), ECG08: 373 MS
SENTARA SPECIMEN COL,SENBCF: NORMAL
VENTRICULAR RATE, ECG03: 53 BPM

## 2023-01-23 PROCEDURE — 93005 ELECTROCARDIOGRAM TRACING: CPT

## 2023-01-23 PROCEDURE — 99001 SPECIMEN HANDLING PT-LAB: CPT

## 2023-12-28 ENCOUNTER — HOSPITAL ENCOUNTER (OUTPATIENT)
Facility: HOSPITAL | Age: 63
Discharge: HOME | End: 2023-12-28
Payer: COMMERCIAL

## 2023-12-28 ENCOUNTER — HOSPITAL ENCOUNTER (OUTPATIENT)
Facility: HOSPITAL | Age: 63
End: 2023-12-28
Payer: COMMERCIAL

## 2023-12-28 DIAGNOSIS — T84.093A OTHER MECHANICAL COMPLICATION OF INTERNAL LEFT KNEE PROSTHESIS, INITIAL ENCOUNTER (HCC): ICD-10-CM

## 2023-12-28 DIAGNOSIS — M25.562 LEFT KNEE PAIN, UNSPECIFIED CHRONICITY: ICD-10-CM

## 2023-12-28 LAB
ALBUMIN SERPL-MCNC: 3.8 G/DL (ref 3.4–5)
ALBUMIN/GLOB SERPL: 1 (ref 0.8–1.7)
ALP SERPL-CCNC: 60 U/L (ref 45–117)
ALT SERPL-CCNC: 41 U/L (ref 16–61)
ANION GAP SERPL CALC-SCNC: 2 MMOL/L (ref 3–18)
APPEARANCE UR: CLEAR
APTT PPP: 28.6 SEC (ref 23–36.4)
AST SERPL-CCNC: 29 U/L (ref 10–38)
BILIRUB SERPL-MCNC: 0.5 MG/DL (ref 0.2–1)
BILIRUB UR QL: NEGATIVE
BUN SERPL-MCNC: 24 MG/DL (ref 7–18)
BUN/CREAT SERPL: 21 (ref 12–20)
CALCIUM SERPL-MCNC: 9 MG/DL (ref 8.5–10.1)
CHLORIDE SERPL-SCNC: 108 MMOL/L (ref 100–111)
CO2 SERPL-SCNC: 29 MMOL/L (ref 21–32)
COLOR UR: YELLOW
CREAT SERPL-MCNC: 1.13 MG/DL (ref 0.6–1.3)
EKG ATRIAL RATE: 57 BPM
EKG DIAGNOSIS: NORMAL
EKG P AXIS: 40 DEGREES
EKG P-R INTERVAL: 160 MS
EKG Q-T INTERVAL: 424 MS
EKG QRS DURATION: 94 MS
EKG QTC CALCULATION (BAZETT): 412 MS
EKG R AXIS: 38 DEGREES
EKG T AXIS: 36 DEGREES
EKG VENTRICULAR RATE: 57 BPM
ERYTHROCYTE [DISTWIDTH] IN BLOOD BY AUTOMATED COUNT: 12.7 % (ref 11.6–14.5)
ERYTHROCYTE [SEDIMENTATION RATE] IN BLOOD: 10 MM/HR (ref 0–20)
GLOBULIN SER CALC-MCNC: 3.8 G/DL (ref 2–4)
GLUCOSE SERPL-MCNC: 107 MG/DL (ref 74–99)
GLUCOSE UR STRIP.AUTO-MCNC: NEGATIVE MG/DL
HCT VFR BLD AUTO: 43.5 % (ref 36–48)
HGB BLD-MCNC: 14.6 G/DL (ref 13–16)
HGB UR QL STRIP: NEGATIVE
INR PPP: 1 (ref 0.9–1.1)
KETONES UR QL STRIP.AUTO: NEGATIVE MG/DL
LEUKOCYTE ESTERASE UR QL STRIP.AUTO: NEGATIVE
MCH RBC QN AUTO: 30 PG (ref 24–34)
MCHC RBC AUTO-ENTMCNC: 33.6 G/DL (ref 31–37)
MCV RBC AUTO: 89.5 FL (ref 78–100)
NITRITE UR QL STRIP.AUTO: NEGATIVE
NRBC # BLD: 0 K/UL (ref 0–0.01)
NRBC BLD-RTO: 0 PER 100 WBC
PH UR STRIP: 6.5 (ref 5–8)
PLATELET # BLD AUTO: 205 K/UL (ref 135–420)
PMV BLD AUTO: 9.6 FL (ref 9.2–11.8)
POTASSIUM SERPL-SCNC: 5.4 MMOL/L (ref 3.5–5.5)
PROT SERPL-MCNC: 7.6 G/DL (ref 6.4–8.2)
PROT UR STRIP-MCNC: NEGATIVE MG/DL
PROTHROMBIN TIME: 13.5 SEC (ref 11.9–14.7)
RBC # BLD AUTO: 4.86 M/UL (ref 4.35–5.65)
SODIUM SERPL-SCNC: 139 MMOL/L (ref 136–145)
SP GR UR REFRACTOMETRY: 1.01 (ref 1–1.03)
UROBILINOGEN UR QL STRIP.AUTO: 0.2 EU/DL (ref 0.2–1)
WBC # BLD AUTO: 5.3 K/UL (ref 4.6–13.2)

## 2023-12-28 PROCEDURE — 85652 RBC SED RATE AUTOMATED: CPT

## 2023-12-28 PROCEDURE — 85610 PROTHROMBIN TIME: CPT

## 2023-12-28 PROCEDURE — 93005 ELECTROCARDIOGRAM TRACING: CPT

## 2023-12-28 PROCEDURE — 80053 COMPREHEN METABOLIC PANEL: CPT

## 2023-12-28 PROCEDURE — 85027 COMPLETE CBC AUTOMATED: CPT

## 2023-12-28 PROCEDURE — 93010 ELECTROCARDIOGRAM REPORT: CPT | Performed by: INTERNAL MEDICINE

## 2023-12-28 PROCEDURE — 87086 URINE CULTURE/COLONY COUNT: CPT

## 2023-12-28 PROCEDURE — 81003 URINALYSIS AUTO W/O SCOPE: CPT

## 2023-12-28 PROCEDURE — 36415 COLL VENOUS BLD VENIPUNCTURE: CPT

## 2023-12-28 PROCEDURE — 85730 THROMBOPLASTIN TIME PARTIAL: CPT

## 2023-12-29 LAB
BACTERIA SPEC CULT: NORMAL
SERVICE CMNT-IMP: NORMAL
SERVICE CMNT-IMP: NORMAL

## (undated) DEVICE — NEEDLE SPNL 20GA LNG YEL HUB DISP

## (undated) DEVICE — STERILE POLYISOPRENE POWDER-FREE SURGICAL GLOVES: Brand: PROTEXIS

## (undated) DEVICE — LIGHT HANDLE: Brand: DEVON

## (undated) DEVICE — SOLUTION IV 250ML 0.9% SOD CHL CLR INJ FLX BG CONT PRT CLSR

## (undated) DEVICE — STERILE POLYISOPRENE POWDER-FREE SURGICAL GLOVES WITH EMOLLIENT COATING: Brand: PROTEXIS

## (undated) DEVICE — SYR LR LCK 1ML GRAD NSAF 30ML --

## (undated) DEVICE — NDL PRT INJ NSAF BLNT 18GX1.5 --

## (undated) DEVICE — (D)PREP SKN CHLRAPRP APPL 26ML -- CONVERT TO ITEM 371833

## (undated) DEVICE — PACK PROCEDURE SURG TOT KNEE CUST

## (undated) DEVICE — BLADE SAW 1.19X20X90 MM FOR LG BNE

## (undated) DEVICE — GARMENT COMPR M FOR 13IN FT INTMIT SGL BLDR HEM FORC II

## (undated) DEVICE — SUTURE STRATAFIX SPRL SZ 1 L5IN ABSRB VLT CT-1 L36MM 1/2 SXPD2B401

## (undated) DEVICE — SOL INJ SOD CL 0.9% 500ML BG --

## (undated) DEVICE — SOL IRRIGATION INJ NACL 0.9% 500ML BTL

## (undated) DEVICE — SUTURE STRATAFIX SYMMETRIC PDS + 1 SGL ARMED CT 18 IN LEN SXPP1A405

## (undated) DEVICE — PLUS HANDPIECE WITH SPRAY TIP: Brand: SURGILAV

## (undated) DEVICE — GOWN,SIRUS,NONRNF,SETINSLV,XL,20/CS: Brand: MEDLINE

## (undated) DEVICE — SOLUTION SCRB 4OZ 10% PVP I POVIDONE IOD TOP PAINT EXIDINE

## (undated) DEVICE — NEEDLE HYPO 22GA L1.5IN BLK S STL HUB POLYPR SHLD REG BVL

## (undated) DEVICE — THE CANADY HYBRID PLASMA SCALPEL IS AN ELECTROSURGICAL PLASMA SCALPEL THAT USES AN 85MM BENDABLE PADDLE BLADE TIP. THE ELECTROSURGICAL PLASMA SCALPEL IS USED TO SIMULTANEOUSLY CUT AND COAGULATE BIOLOGICAL TISSUE.: Brand: CANADY HYBRID PLASMA PADDLE BLADE

## (undated) DEVICE — 4-PORT MANIFOLD: Brand: NEPTUNE 2

## (undated) DEVICE — BLADE SAW W13XL90MM 1.19MM PARA

## (undated) DEVICE — DEVON™ KNEE AND BODY STRAP 60" X 3" (1.5 M X 7.6 CM): Brand: DEVON

## (undated) DEVICE — STERILE TETRA-FLEX CF LF, 6IN X 11 YD: Brand: TETRA-FLEX™ CF

## (undated) DEVICE — DRSG FOAM MEPILX PST OP 4X12IN --

## (undated) DEVICE — DRSG MEPILES BORDER AG 4X12 -- 5/BX

## (undated) DEVICE — HANDPIECE SET WITH FAN SPRAY TIP: Brand: INTERPULSE

## (undated) DEVICE — REM POLYHESIVE ADULT PATIENT RETURN ELECTRODE: Brand: VALLEYLAB

## (undated) DEVICE — SOL INJ SOD CL 0.9% 100ML BG --

## (undated) DEVICE — TOTAL KNEE PACK-LF: Brand: MEDLINE INDUSTRIES, INC.

## (undated) DEVICE — SYSTEM SKIN CLSR 22CM DERMBND PRINEO

## (undated) DEVICE — NDL SPNE QNCKE 18GX3.5IN LF --

## (undated) DEVICE — CONCISE CEMENT SCULPS KIT: Brand: CONCISE

## (undated) DEVICE — VIS ADPT GUIDE KIT

## (undated) DEVICE — TABLE COVER: Brand: CONVERTORS

## (undated) DEVICE — 3 BONE CEMENT MIXER: Brand: MIXEVAC

## (undated) DEVICE — SUT VCRL + 2-0 36IN CT1 UD --

## (undated) DEVICE — SYR 20ML LL STRL LF --

## (undated) DEVICE — SUT VCRL + 1 36IN CT1 VIO --

## (undated) DEVICE — INTENDED FOR TISSUE SEPARATION, AND OTHER PROCEDURES THAT REQUIRE A SHARP SURGICAL BLADE TO PUNCTURE OR CUT.: Brand: BARD-PARKER ® CARBON RIB-BACK BLADES

## (undated) DEVICE — SOLUTION IRRIG 3000ML LAC R FLX CONT

## (undated) DEVICE — KENDALL SCD EXPRESS FOOT CUFF, MEDIUM: Brand: KENDALL SCD

## (undated) DEVICE — UNDERCAST PADDING: Brand: DEROYAL

## (undated) DEVICE — NDL SPNE LR LCK 18GX6IN PNK --

## (undated) DEVICE — IMMOBILIZER KNEE UNIV L19IN FOR 12-24IN THGH FOAM T BAR

## (undated) DEVICE — TURNOVER KIT OR F/MIH 1450